# Patient Record
Sex: MALE | Race: WHITE | Employment: PART TIME | URBAN - METROPOLITAN AREA
[De-identification: names, ages, dates, MRNs, and addresses within clinical notes are randomized per-mention and may not be internally consistent; named-entity substitution may affect disease eponyms.]

---

## 2017-01-17 PROBLEM — L03.115 CELLULITIS OF RIGHT LOWER EXTREMITY: Status: ACTIVE | Noted: 2017-01-17

## 2017-01-18 ENCOUNTER — OUTSIDE SERVICES (OUTPATIENT)
Dept: PODIATRY | Age: 72
End: 2017-01-18

## 2017-06-19 ENCOUNTER — TELEPHONE (OUTPATIENT)
Dept: CASE MANAGEMENT | Age: 72
End: 2017-06-19

## 2021-07-22 ENCOUNTER — HOSPITAL ENCOUNTER (OUTPATIENT)
Dept: PULMONOLOGY | Age: 76
Discharge: HOME | End: 2021-07-22
Payer: COMMERCIAL

## 2021-07-22 VITALS — HEART RATE: 67 BPM | OXYGEN SATURATION: 99 % | RESPIRATION RATE: 16 BRPM

## 2021-07-22 DIAGNOSIS — R94.2 NONSPECIFIC ABNORMAL RESULTS OF PULMONARY SYSTEM FUNCTION STUDY: ICD-10-CM

## 2021-07-22 LAB
DLCO %PRED: 75 %
DLCO PRED: NORMAL
DLCO/VA %PRED: NORMAL
DLCO/VA PRED: NORMAL
DLCO/VA: NORMAL
DLCO: NORMAL
EXPIRATORY TIME: NORMAL
FEF 25-75% %PRED-PRE: NORMAL
FEF 25-75% PRED: NORMAL
FEF 25-75%-PRE: NORMAL
FEV1 %PRED-PRE: 43 %
FEV1 PRED: NORMAL
FEV1/FVC %PRED-PRE: NORMAL
FEV1/FVC PRED: NORMAL
FEV1/FVC: 96 %
FEV1: NORMAL
FVC %PRED-PRE: NORMAL
FVC PRED: NORMAL
FVC: NORMAL
GAW %PRED: NORMAL
GAW PRED: NORMAL
GAW: NORMAL
IC %PRED: NORMAL
IC PRED: NORMAL
IC: NORMAL
MVV %PRED-PRE: NORMAL
MVV PRED: NORMAL
MVV-PRE: NORMAL
PEF %PRED-PRE: NORMAL
PEF PRED: NORMAL
PEF-PRE: NORMAL
RAW %PRED: NORMAL
RAW PRED: NORMAL
RAW: NORMAL
RV %PRED: NORMAL
RV PRED: NORMAL
RV: NORMAL
SVC %PRED: NORMAL
SVC PRED: NORMAL
SVC: NORMAL
TLC %PRED: 78 %
TLC PRED: NORMAL
TLC: NORMAL
VA %PRED: NORMAL
VA PRED: NORMAL
VA: NORMAL
VTG %PRED: NORMAL
VTG PRED: NORMAL
VTG: NORMAL

## 2021-07-22 PROCEDURE — 94726 PLETHYSMOGRAPHY LUNG VOLUMES: CPT

## 2021-07-22 PROCEDURE — 94760 N-INVAS EAR/PLS OXIMETRY 1: CPT

## 2021-07-22 PROCEDURE — 94729 DIFFUSING CAPACITY: CPT

## 2021-07-22 PROCEDURE — 94200 LUNG FUNCTION TEST (MBC/MVV): CPT

## 2021-07-22 PROCEDURE — 94010 BREATHING CAPACITY TEST: CPT

## 2021-07-22 ASSESSMENT — PULMONARY FUNCTION TESTS
FEV1_PERCENT_PREDICTED_PRE: 43
FEV1/FVC: 96

## 2021-07-26 NOTE — PROCEDURES
Pulmonary Function Testing      Patient name:  Edson Oro     [facility] Unit #:   [id number]   Date of test: 7/22/2021  Date of interpretation:   7/26/2021    Mr. Edson Oro is a 67y.o. year-old current smoker. The spirometry data were acceptable and reproducible. Spirometry:  Flow volume loops were obstructed. The FEV-1/FVC ratio was decreased. The  Prebronchodilator FEV-1 was 1.35 liters (43% of predicted), which was severely decreased. The FVC was 1.95 liters (45% of predicted), which was decreased. Response to inhaled bronchodilators (albuterol) was not performed. Lung volumes:  Lung volumes were tested by plethysmography. The total lung capacity was 5.17 liters (78% of predicted), which was decreased. The residual volume was 2.49 liters (92% of predicted), which was normal. The ratio of residual volume to total lung capacity (RV/TLC) was 113, which was normal. Specific airway resistance was normal.    Diffusion capacity was found to be decreased. Interpretation:  Obstructive airway disease with reduced diffusion capacity.

## 2022-06-30 ENCOUNTER — HOSPITAL ENCOUNTER (OUTPATIENT)
Dept: PHYSICAL THERAPY | Age: 77
Setting detail: THERAPIES SERIES
Discharge: HOME | End: 2022-06-30
Payer: COMMERCIAL

## 2022-06-30 PROCEDURE — 97530 THERAPEUTIC ACTIVITIES: CPT

## 2022-06-30 PROCEDURE — 97161 PT EVAL LOW COMPLEX 20 MIN: CPT

## 2022-06-30 NOTE — PLAN OF CARE
[address] [address], [facility] [address]  Phone: [phone]   Fax: [phone]                                                       Physical Therapy Certification    Dear Deedee Green NP  ,    We had the pleasure of evaluating the following patient for physical therapy services at [address] [place] [address]. A summary of our findings can be found in the initial assessment below. This includes our plan of care. If you have any questions or concerns regarding these findings, please do not hesitate to contact me at the office phone number checked above. Thank you for the referral.       Physician Signature:_______________________________Date:__________________  By signing above (or electronic signature), therapists plan is approved by physician      Patient: Inge Echeverria   : 1945   MRN: [id number]  Referring Physician: Deedee Green NP        Evaluation Date: 2022      Medical Diagnosis Information:  Diagnosis: BILATERAL LOWER EXTREMITY LYMPHEDEMA     PT diagnosis:       B LE chronic swelling and wounds R > L and decreased B LE strength, decreased functional mobility                              Insurance information: PT Insurance Information: [address] [place] [address] - no auth/no copay/BMN     Preferred Language for Healthcare:   [x]English       []Other:    C-SSRS Triggered by Intake questionnaire (Past 2 wk assessment ):   [x] No, Questionnaire did not trigger screening.   [] Yes, Patient intake triggered C-SSRS Screening     [] Completed, no further action required.    [] Completed, PCP notified via Epic      Functional Scale:                                                                                                      Date assessed:  TO physical FS primary measure score = 39; risk adjusted = 47                   22    SUBJECTIVE: Pt leg swelling (I20)  []Atherosclerosis (I70)  []Pacemaker  []Hx of CABG/stent/  cardiac surgeries   Musculoskeletal conditions   []Disc pathology   []Congenital spine pathologies   []Osteoporosis (M81.8)  []Osteopenia (M85.8)  []Scoliosis       Endocrine conditions   []Hypothyroid (E03.9)  []Hyperthyroid Gastrointestinal conditions   []Constipation (Q76.92)   Metabolic conditions   []Morbid obesity (E66.01)  []Diabetes type 1(E10.65) or 2 (E11.65)   []Neuropathy (G60.9)     Cardio/Pulmonary conditions   []Asthma (J45)  []Coughing   []COPD (J44.9)  []CHF  []A-fib   Psychological Disorders  []Anxiety (F41.9)  []Depression (F32.9)   []Other:   Developmental Disorders  []Autism (F84.0)  []CP (G80)  []Down Syndrome (Q90.9)  []Developmental delay     Neurological conditions  []Prior Stroke (I69.30)  []Parkinson's (G20)  []Encephalopathy (G93.40)  []MS (G35)  []Post-polio (G14)  []SCI  []TBI  []ALS Other conditions  []Fibromyalgia (M79.7)  []Vertigo  []Syncope  []Kidney Failure  []Cancer      []currently undergoing                treatment  []Pregnancy  []Incontinence   Prior surgeries  []involved limb  []previous spinal surgery  [] section birth  []hysterectomy  []bowel / bladder surgery  []other relevant surgeries   [x]Other:   Smoker- 5 cigarettes per day            OBJECTIVE:   Posture: rounded shoulders    Functional Mobility/TransfersGAIT: pt enters PT clinic in w/c, able to self propel with LEs.  Pt able to transfer from w/c to bed with SBA, noted flex in BLE when standing, pt able to complete bed mobility independently, pt able to walk SBA with RW and w/c follow 121.3 ft      ROM Right Left Comments         LE    [facility] WFL Assist from UEs to ER hips               UE        Shoulder flex [facility] WFL    Shoulder AB [facility]    Shoulder ER      Shoulder IR                Strength / Myotomes Right Left Comments   LE      Hip Flexors (L1-2) 4- 4- Pain in BLE   Hip Internal Rotators      Hip External Rotators      Quads (L2-4) 3+ 4 Pain in BLE   Hamstrings  3+ 3+ Pain in BLE   Ankle Dorsiflexion (L4-5) 4- 4- Pain in BLE           Pitting   [] none [] slightly [x] moderate [] severe [] brawny (does not indent)   Color    [] dusky [] mottled [] red streaks [x] other: darkening L lateral shin   Skin Texture   [x] rough  [x] dry   [] moist  [] normal  [x] hyperkaratosis [x] hyperplasia  [x] hyperpigmentation [] Elephantiasis  [] papillomas  [x] Skin breakdown with lymphorrhea (weeping)  Skin Temperature   [] normal [] cool  [x] uneven - mildly inc on R [] warm [] hot  Edema Rebound   [] quick [] slow [x] fibrotic tissue - R>L  *pressure applied x10 seconds    Signs of Constriction:  Condition of Nailbeds:   [x] discolored [] red  [] white [] swollen     Skin Breakdown (indicate size, location and number) [x] Yes [] No  Comments: two wounds on R ant/lat shin, guaze is 2 by 2 inched on posterior wound, 1.5 by 2.5 inch on anterior wound  Fistulas (an abnormal passageway) [] Yes  [x] No  Comments:  Tinea (fungus) [x] Yes [] No  Comments: B toenails  Papilloma (benign tumor arising from an epithelial layer)  [] Yes [x] No  Comments:   Fibrotic areas [x] Yes [] No  Comments:  R>L LE below knee  Lymphorrhea (flow of lymph from a cut or ruptured lymph vessels): [x] Yes [] No  Comments: every time he gets a blister R LE  Warts (a local growth of the outer layer of skin) [] Yes [x] No  Comments:  Ulcers  [x] Yes [] No  Comments: R leg x2 see above    SCARS: from skin cancer surgery on R LE      STEMMER SIGN: [] positive [x] negative    Stage of Lymphedema   [] Latency stage/Lymphangiopathy (Stage 0 / Prestage / Subclinical stage):   · No swelling  · Reduced transport capacity (TC)  · \"Normal\" tissue consistency  [] Stage 1 (reversible stage):  · Edema is soft (pitting)  · No secondary tissue changes  · Elevation reduces swelling  [x] Stage 2 (spontaneously irreversible stage)  · Lyphostatic fibrosis  · Hardening of the tissue (no pitting)  · Stemmer sign positive   · Frequent infections   [] Stage 3 (lymphostatic elephantiasis)  · Extreme increase in volume and tissue texture with typical skin changes (papillomas, deep skinfolds, etc.)  · Stemmer sign positive    GIRTH MEASUREMENT  (Tape on skin along anterior tibialis)    Lower Extremity Right (cm) Left  (cm)   Date 6/30/22 6/30/22   Measurements taken as follows: 0 cm at inf aspect of lateral malleolus and marked on    [] Ant aspect of LE    [x] Lateral aspect of LE    [] sheet          cm  Widest at hip     cm at waist (at umbilicus), measured while reclined on hospital bed 132.0 cm         Metatarsal heads 25.7cm 25.7cm   0 Cm above inf aspect of  LATERAL MALLEOLUS 37.8 35.5 cm    10 Cm above inf aspect of  LATERAL MALLEOLUS    38.7 33.4cm   20 Cm above  inf aspec of LATERAL MALLEOLUS  45.3 38.5   30 Cm above  inf aspect of  LATERAL MALLEOLUS 46.4 41.4   Cm above  inf aspect of LATERAL MALLEOLUS      Cm above  inf aspect of LATERAL MALLEOLUS                    Total Girth 193.9 174.5       Classification for Lymphedema  [] Mild:  < 3 cm differential between affected limb and unaffected limb  [x] Moderate:  L LE :  3 - 5 cm differential between affected limb and unaffected limb   [x] Severe:   R LE:  5+ cm differential between affected limb and unaffected limb           Barriers to/and or personal factors that will affect rehab potential:              [x]Age  []Sex    [x]Smoker              []Motivation/Lack of Motivation                        []Co-Morbidities              []Cognitive Function, education/learning barriers              []Environmental, home barriers              []profession/work barriers  [x]past PT/medical experience  []other:    Falls Risk Assessment (30 days):   [x] Falls Risk assessed and no intervention required.   [] Falls Risk assessed and Patient requires intervention due to being higher risk   TUG score (>12s at risk):     [] Falls education provided, including         ASSESSMENT: Pt presents with B LE chronic swelling and wounds R > L and decreased B LE strength, decreased functional mobility. History of multiple wounds R  LE over the past 6 yrs. Pt impairments limit activities such as standing, walking, lifting, and squatting. Pt deficits limit participation in ADLs such as walking to the bathroom, household work, and participation in social activities. Pt will benefit from skilled PT to reduce swelling and increase strength for better functional mobility.      Functional Impairments:   [x] Noted increased girth of BLE  [x] Noted decreased health of skin at BLE and fibrotic tissue   [x]Decreased functional strength of BLE   []Reduced balance/proprioceptive control    []other:  reduced functional ROM of    [x]other:  reduce functional strength of standing and walking tolerance    []other: myofascial changes and pain at    [] Postural impairments:   []other:      Functional Activity Limitations (from functional questionnaire and intake)  [x]Reduced ability to use affected limb for ADLs/IADLs due to swelling causing symptoms of heaviness, skin tightness, pain  [x]Reduced ability to perform lifting, reaching, carrying tasks  [x]Reduced ability to wear his/her normal clothes/shoes due to swelling    [x]Reduced ability to tolerate prolonged functional positions  [x]Reduced ability or difficulty with changes of positions or transfers between positions  [x]Reduced ability to maintain good posture and demonstrate good body mechanics with sitting, bending, and lifting   []Reduced ability to sleep   [x] Reduced ability or tolerance with driving and/or computer work   [x]Reduced ability to squat   [x]Reduced ability to forward bend   [x]Reduced ability to ambulate prolonged functional periods/distances/surfaces   [x]Reduced ability to ascend/descend stairs    []Reduced ability to tolerate any impact through UE or spine   []other:        Participation Restrictions   [x]Reduced participation in self care activities   [x]Reduced participation in home management activities   []Reduced participation in work activities   [x]Reduced participation in social activities. [x]Reduced participation in sport/recreational activities. Prognosis/Rehab Potential:      []Excellent   [x]Good    []Fair   []Poor    Tolerance of evaluation/treatment:    []Excellent   [x]Good    []Fair   []Poor     Physical Therapy Evaluation Complexity Justification  [x] A history of present problem with:  [] no personal factors and/or comorbidities that impact the plan of care;  [x]1-2 personal factors and/or comorbidities that impact the plan of care  []3 personal factors and/or comorbidities that impact the plan of care  [x] An examination of body systems using standardized tests and measures addressing any of the following: body structures and functions (impairments), activity limitations, and/or participation restrictions;:  [x] a total of 1-2 or more elements   [] a total of 3 or more elements   [] a total of 4 or more elements   [x] A clinical presentation with:  [x] stable and/or uncomplicated characteristics   [] evolving clinical presentation with changing characteristics  [] unstable and unpredictable characteristics;   [x] Clinical decision making of [x] low, [] moderate, [] high complexity using standardized patient assessment instrument and/or measurable assessment of functional outcome.     [x] EVAL (LOW) 34398 (typically 15 minutes face-to-face)  [] EVAL (MOD) 10952 (typically 30 minutes face-to-face)  [] EVAL (HIGH) 59352 (typically 45 minutes face-to-face)  [] RE-EVAL     PLAN:  manual lymph drainage to BLEs; compression, HEP, education on lymphedema management, ROM/strength exercises to restore PLOF    Frequency/Duration:  2 days per week for 6 Weeks:  Interventions:  [x]  Compression to include multilayer compression bandaging and/or compression garments as appropriate  [x]  Manual therapy as indicated for BLE swelling to include: manual lymph drainage, STM, ROM as appropriate. [x]  Modalities as needed that may include: lymphedema pump, thermal agents, as indicated  [x]  Patient education on lymphedema management, compression, activity modification, progression of HEP. [x]  Therapeutic exercise including: strength/ROM/flexibility  [x]  NMR activation and proprioception  including postural re-education         AQUATIC EXERCISE      HEP instruction: Written HEP instructions provided and reviewed:    GOALS:  Patient stated goal: \"Pt wants to be able to stand with his walker and walk household distances\"  [] Progressing: [] Met: [] Not Met: [] Adjusted    Therapist goals for Patient:   Short Term Goals: To be achieved in: 2 weeks  1. Independent in HEP and progression per patient tolerance, in order to prevent return of swelling   [] Progressing: [] Met: [] Not Met: [] Adjusted  2. Patient will have a decrease in swelling/pain to facilitate improvement in movement, function, and ADLs as indicated by improvement with LLIS. [] Progressing: [] Met: [] Not Met: [] Adjusted    Long Term Goals: To be achieved in: 6 weeks  1. FOTO score of at least 46 to assist with reaching prior level of function. [] Progressing: [] Met: [] Not Met: [] Adjusted  2. Patient will demonstrate increased AROM/strength of BLE to 4/5 so that pt can resume walking and standing without increase in symptoms. [] Progressing: [] Met: [] Not Met: [] Adjusted  3. Decrease swelling of BLEs by 5cm so that pt can return to functional activities including standing, walking, squatting, and lifting without increased symptoms or restriction. [] Progressing: [] Met: [] Not Met: [] Adjusted  4. Patient will be able to stand for >/= 5 minutes in order to improve standing tolerance for performing ADLs. [] Progressing: [] Met: [] Not Met: [] Adjusted  5.  Patient will be able to ambulate short community distances (300-500ft) with the LRAD to improve functional mobility in his home and community. [] Progressing: [] Met: [] Not Met: [] Adjusted    Electronically signed by:  Aleida Francis PT DPT ELIESER Lee, SPT  Therapist was present, directed the patient's care, made skilled judgement, and was responsible for assessment and treatment of the patient.

## 2022-06-30 NOTE — FLOWSHEET NOTE
[address] [facility] Therapy  Phone: [phone]   Fax: [phone]    Physical Therapy Daily LYMPHEDEMA Treatment Note    Date:  2022    Patient Name:  Stephanie Bone    :  1945  MRN: [id number]  Restrictions/Precautions:    Medical/Treatment Diagnosis Information:  Diagnosis: BILATERAL LOWER EXTREMITY LYMPHEDEMA  · Treatment Diagnosis: B LE chronic swelling and wounds R > L and decreased B LE strength, decreased functional mobilityPT diagnosis:  Insurance/Certification information:  PT Insurance Information: [address] [place] [address] - no auth/no copay/BMN  Physician Information:  Brigette Bob NP    Plan of care signed (Y/N): []  Yes [x]  No     Date of Patient follow up with Physician:       Progress Report: []  Yes  [x]  No     Date Range for reporting period:  Beginnin22  Ending    Progress report due (10 Rx/or 30 days whichever is less): visit #10 or 3/81/15     Recertification due (POC duration/ or 90 days whichever is less): 22    Visit # Insurance Allowable Auth required?  Date Range    BMN []  Yes  [x]  No pcy        Latex Allergy:  [x]NO      []YES  Preferred Language for Healthcare:   [x]English       []other:      Functional Scale:                                                                                                      Date assessed:  TO physical FS primary measure score = 39; risk adjusted = 47                  22      Pain level:  2/10 BLEs    SUBJECTIVE:  See eval    OBJECTIVE: See eval      RESTRICTIONS/PRECAUTIONS:     Exercises/Interventions:     Therapeutic Exercises (23868) Resistance / level Sets/sec Reps Notes   Nu step       Pt educated on exercises to stimulate lymphatic flow   5'   ankle pumps  Toe DF/PF      CKC LE ex at // bars or counter                            Therapeutic Activities (50193)  (Dynamic activities such as compression, designed to improve functional performance) info for PT to send PT notes and insurance info to Ashley Quintero at [employer] re possibility of getting lymphedema pump for B LE    Pt education:   6/30   pt ed and provided with form on what compression wrappings to purchase. Ed on ankle pumps and toe flx/ext to stimulate lymphatic flow. Pt educated on pathology and anatomy of etiology of lymphedema, condition precautions, indications for long term prognosis. Pt was educated on diagnosis; prognosis; PT POC including MLD, compression (role of multilayer bandaging/ compression garments), lymphedema management/prevention of flare ups, role of exercise, HEP, lymphedema pump; expectations for rehab. All pt questions were answered. HEP instruction:  Pt instructed in lymphedema management/prevention concepts and swipe method of MLD, ankle pumps, toe DF/PF    Therapeutic Exercise and NMR EXR  [x] (98400) Provided verbal/tactile cueing for activities related to strengthening, flexibility, endurance, ROM for improvements in  [x] LE / Lumbar: LE, proximal hip, and core control with self care, mobility, lifting, ambulation. [] UE / Cervical: cervical, postural, scapular, scapulothoracic and UE control with self care, reaching, carrying, lifting, house/yardwork, driving, computer work.  [] (74838) Provided verbal/tactile cueing for activities related to improving balance, coordination, kinesthetic sense, posture, motor skill, proprioception to assist with   [] LE / lumbar: LE, proximal hip, and core control in self care, mobility, lifting, ambulation and eccentric single leg control.    [] UE / cervical: cervical, scapular, scapulothoracic and UE control with self care, reaching, carrying, lifting, house/yardwork, driving, computer work.   [] (39365) Therapist is in constant attendance of 2 or more patients providing skilled therapy interventions, but not providing any significant amount of measurable one-on-one time to either patient, for improvements in  [] LE / motor skill, proprioception of   [] LE: core, proximal hip and LE for self care, mobility, lifting, and ambulation/stair navigation    [] UE / Cervical: cervical, postural,  scapular, scapulothoracic and UE control with self care, reaching, carrying, lifting, house/yardwork, driving, computer work    Manual Treatments:  PROM / STM / Oscillations-Mobs:  G-I, II, III, IV (PA's, Inf., Post.)  [] (88874) Provided manual therapy to mobilize LE, proximal hip and/or LS spine soft tissue/joints for the purpose of modulating pain, promoting relaxation,  increasing ROM, reducing/eliminating soft tissue swelling/inflammation/restriction, improving soft tissue extensibility and allowing for proper ROM for normal function with   [] LE / lumbar: self care, mobility, lifting and ambulation. [] UE / Cervical: self care, reaching, carrying, lifting, house/yardwork, driving, computer work. Modalities:  [] (80334) Vasopneumatic compression: Utilized vasopneumatic compression to decrease edema / swelling for the purpose of improving mobility and quad tone / recruitment which will allow for increased overall function including but not limited to self-care, transfers, ambulation, and ascending / descending stairs. Charges:  Timed Code Treatment Minutes: 35   Total Treatment Minutes: 60     [x] EVAL - LOW (16780)   [] EVAL - MOD (39627)  [] EVAL - HIGH (97023)  [] RE-EVAL (66570)  [] GF(89629) x   1    [] Ionto  [] NMR (58806) x       [] Vaso  [] Manual (60792) x       [] Ultrasound  [x] TA x   2     [] Mech Traction (48248)  [] Aquatic Therapy x     [] ES (un) (24911):   [] Home Management Training x  [] ES(attended) (86018)   [] Dry Needling 1-2 muscles (40241):  [] Dry Needling 3+ muscles (827240  [] Group:      [] Other:     GOALS:  Patient stated goal: \"Pt wants to be able to stand with his walker and walk household distances\"  []? Progressing: []? Met: []? Not Met: []?  Adjusted     Therapist goals for Patient:   Short Term Goals: To be achieved in: 2 weeks  1. Independent in HEP and progression per patient tolerance, in order to prevent return of swelling   []? Progressing: []? Met: []? Not Met: []? Adjusted  2. Patient will have a decrease in swelling/pain to facilitate improvement in movement, function, and ADLs as indicated by improvement with LLIS. []? Progressing: []? Met: []? Not Met: []? Adjusted     Long Term Goals: To be achieved in: 6 weeks  1. FOTO score of at least 46 to assist with reaching prior level of function. []? Progressing: []? Met: []? Not Met: []? Adjusted  2. Patient will demonstrate increased AROM/strength of BLE to 4/5 so that pt can resume walking and standing without increase in symptoms. []? Progressing: []? Met: []? Not Met: []? Adjusted  3. Decrease swelling of BLEs by 5cm so that pt can return to functional activities including standing, walking, squatting, and lifting without increased symptoms or restriction. []? Progressing: []? Met: []? Not Met: []? Adjusted  4. Patient will be able to stand for >/= 5 minutes in order to improve standing tolerance for performing ADLs.  []? Progressing: []? Met: []? Not Met: []? Adjusted  5. Patient will be able to ambulate short community distances (300-500ft) with the LRAD to improve functional mobility in his home and community. []? Progressing: []? Met: []? Not Met: []? Adjusted    Overall Progression Towards Functional goals/ Treatment Progress Update:  [] Patient is progressing as expected towards functional goals listed. [] Progression is slowed due to complexities/Impairments listed. [] Progression has been slowed due to co-morbidities.   [x] Plan just implemented, too soon to assess goals progression <30days   [] Goals require adjustment due to lack of progress  [] Patient is not progressing as expected and requires additional follow up with physician  [] Other    Persisting Functional Limitations/Impairments:  []Sleeping []Sitting               [x]Standing [x]Transfers        [x]Walking [x]Kneeling               [x]Stairs [x]Squatting / bending   [x]ADLs []Reaching  [x]Lifting  [x]Housework  []Driving []Job related tasks  []Sports/Recreation []Other:        ASSESSMENT:  See eval    Treatment/Activity Tolerance:  [x] Patient able to complete tx [] Patient limited by fatigue  [] Patient limited by pain  [] Patient limited by other medical complications  [] Other:     Prognosis: [x] Good [] Fair  [] Poor    Patient Requires Follow-up: [x] Yes  [] No    Plan for next treatment session:   MLD, compression - bandage B LE below knee, HEP, pt education, lymph pump  B LE, lymph pump for home to help prevent chronic wounds R>L LE, exercise to stimulate lymphatic flow, LE ex, balance, gait, fxnl mobility    PLAN: See eval. PT 2x / week for 6 weeks. [] Continue per plan of care [] Alter current plan (see comments)  [x] Plan of care initiated [] Hold pending MD visit [] Discharge    Electronically signed by: Terrance Nieves, PT PT, DPT    Delta Se, SPT  Therapist was present, directed the patient's care, made skilled judgement, and was responsible for assessment and treatment of the patient. Note: If patient does not return for scheduled/ recommended follow up visits, this note will serve as a discharge from care along with most recent update on progress.

## 2022-07-12 ENCOUNTER — HOSPITAL ENCOUNTER (OUTPATIENT)
Dept: PHYSICAL THERAPY | Age: 77
Setting detail: THERAPIES SERIES
Discharge: HOME | End: 2022-07-12
Payer: COMMERCIAL

## 2022-07-12 PROCEDURE — 97530 THERAPEUTIC ACTIVITIES: CPT

## 2022-07-12 PROCEDURE — 97140 MANUAL THERAPY 1/> REGIONS: CPT

## 2022-07-12 NOTE — FLOWSHEET NOTE
[address] [facility] Physical Therapy  Phone: [phone]   Fax: [phone]    Physical Therapy Daily LYMPHEDEMA Treatment Note    Date:  2022    Patient Name:  Carlos Enrique Shaw    :  1945  MRN: [id number]  Restrictions/Precautions:    Medical/Treatment Diagnosis Information:  Diagnosis: BILATERAL LOWER EXTREMITY LYMPHEDEMA  · Treatment Diagnosis: B LE chronic swelling and wounds R > L and decreased B LE strength, decreased functional mobilityPT diagnosis:  Insurance/Certification information:  PT Insurance Information: [address] [place] [address] - no auth/no copay/BMN  Physician Information:  Mendel Clinton NP    Plan of care signed (Y/N): []  Yes [x]  No     Date of Patient follow up with Physician:       Progress Report: []  Yes  [x]  No     Date Range for reporting period:  Beginnin22  Ending    Progress report due (10 Rx/or 30 days whichever is less): visit #10 or      Recertification due (POC duration/ or 90 days whichever is less): 22    Visit # Insurance Allowable Auth required? Date Range    BMN []  Yes  [x]  No pcy        Latex Allergy:  [x]NO      []YES  Preferred Language for Healthcare:   [x]English       []other:      Functional Scale:                                                                                                      Date assessed:  TO physical FS primary measure score = 39; risk adjusted = 47                  22      Pain level:  0/10 BLEs    SUBJECTIVE:  Pt reports feeling good today. Reports swelling is same. Did not purchase any bandages due to cost. Has been wearing compression socks provided by nurses at nursing home.      OBJECTIVE: See eval      RESTRICTIONS/PRECAUTIONS:     Exercises/Interventions:     Therapeutic Exercises (58935) Resistance / level Sets/sec Reps Notes   Nu step       Pt educated on exercises to stimulate lymphatic flow         CKC LE ex at // bars or counter Therapeutic Activities (97399)  (Dynamic activities such as compression, designed to improve functional performance)       Compression: trial tensoshape size   applied to B LE - XL on R and large on L 8'      Multilayer compression bandaging to   Stockinette  Artiflex  Foam   cm low stretch compression bandage  cm low stretch compression bandage  cm low stretch compression bandage  cm low stretch compression bandage           Ambulation  100 ft w RW and SBA                                   Home Management  (providing pt education on safety procedures/instructions)       Pt educated on compression as follows:   how to appropriately apply and wear compression  how to maintain appropriate gradient compression  do not sleep with compression garment/tensoshape  signs and symptoms of constriction   when to remove compression       Pt educated on lymphedema prevention and management  5'   7/12 completed during MLD    Pt educated on MLD  5'                           Neuromuscular Re-ed (79794)       balance                     Manual Intervention ([id number])       See MLD flowchart below   X 30 min to B LEs                                         Manual Lymph Drainage (MLD):  MLD to B LE , clearing along alternate pathway of  Ipsilateral trunk  to  Ipsilateral axillary  lymph nodes    Clear Nodes 10x each   Neck x   [address]    Axilla x   Abdomen x   Groin x   Popliteal x2 x   Clear Alternate Pathway 10x each   Re-clear alternate pathway   x5 each position x   Location Ipsilateral trunk       Fluid Mobilization 10x each   Re-clear alternate pathway   x5 each position x   Shoulder bracing    Location B LE and ipsilateral trunk       Protein Resorption 10x each   Location LE below knee       Clear Foot/Ankle or Hand 10x each   Achilles x   Bilateral malleolus x   Fan the cards x   Clear dorsum x   Clear through web space x   Clear toes/fingers x   Fluid mobilization x   Re-clear all positions  X5 each x Modalities:     OTHER: 6/30 pt signed release of info for PT to send PT notes and insurance info to [facility] possibility of getting lymphedema pump for B LE    Pt education:   6/30   pt ed and provided with form on what compression wrappings to purchase. Ed on ankle pumps and toe flx/ext to stimulate lymphatic flow. Pt educated on pathology and anatomy of etiology of lymphedema, condition precautions, indications for long term prognosis. Pt was educated on diagnosis; prognosis; PT POC including MLD, compression (role of multilayer bandaging/ compression garments), lymphedema management/prevention of flare ups, role of exercise, HEP, lymphedema pump; expectations for rehab. All pt questions were answered. HEP instruction:  Pt instructed in lymphedema management/prevention concepts and swipe method of MLD, ankle pumps, toe DF/PF    Therapeutic Exercise and NMR EXR  [x] (95021) Provided verbal/tactile cueing for activities related to strengthening, flexibility, endurance, ROM for improvements in  [x] LE / Lumbar: LE, proximal hip, and core control with self care, mobility, lifting, ambulation. [] UE / Cervical: cervical, postural, scapular, scapulothoracic and UE control with self care, reaching, carrying, lifting, house/yardwork, driving, computer work.  [] (98906) Provided verbal/tactile cueing for activities related to improving balance, coordination, kinesthetic sense, posture, motor skill, proprioception to assist with   [] LE / lumbar: LE, proximal hip, and core control in self care, mobility, lifting, ambulation and eccentric single leg control.    [] UE / cervical: cervical, scapular, scapulothoracic and UE control with self care, reaching, carrying, lifting, house/yardwork, driving, computer work.   [] (37624) Therapist is in constant attendance of 2 or more patients providing skilled therapy interventions, but not providing any significant amount of measurable one-on-one time to either patient, for improvements in  [] LE / lumbar: LE, proximal hip, and core control in self care, mobility, lifting, ambulation and eccentric single leg control. [] UE / cervical: cervical, scapular, scapulothoracic and UE control with self care, reaching, carrying, lifting, house/yardwork, driving, computer work. NMR and Therapeutic Activities:    [x] (80991 or 33183) Provided verbal/tactile cueing for activities related to improving balance, coordination, kinesthetic sense, posture, motor skill, proprioception and motor activation to allow for proper function of   [x] LE: / Lumbar core, proximal hip and LE with self care and ADLs  [] UE / Cervical: cervical, postural, scapular, scapulothoracic and UE control with self care, carrying, lifting, driving, computer work.   [] (04318) Gait Re-education- Provided training and instruction to the patient for proper LE, core and proximal hip recruitment and positioning and eccentric body weight control with ambulation re-education including up and down stairs     Home Management Training / Self Care:  [] (89196) Provided self-care/home management training related to activities of daily living and compensatory training, and/or use of adaptive equipment for improvement with: ADLs and compensatory training, meal preparation, safety procedures and instruction in use of adaptive equipment, including bathing, grooming, dressing, personal hygiene, basic household cleaning and chores.      Home Exercise Program:    [] (30564) Reviewed/Progressed HEP activities related to strengthening, flexibility, endurance, ROM of   [] LE / Lumbar: core, proximal hip and LE for functional self-care, mobility, lifting and ambulation/stair navigation   [] UE / Cervical: cervical, postural, scapular, scapulothoracic and UE control with self care, reaching, carrying, lifting, house/yardwork, driving, computer work  [] (92308)Reviewed/Progressed HEP activities related to improving []? Adjusted     Therapist goals for Patient:   Short Term Goals: To be achieved in: 2 weeks  1. Independent in HEP and progression per patient tolerance, in order to prevent return of swelling   []? Progressing: []? Met: []? Not Met: []? Adjusted  2. Patient will have a decrease in swelling/pain to facilitate improvement in movement, function, and ADLs as indicated by improvement with LLIS. []? Progressing: []? Met: []? Not Met: []? Adjusted     Long Term Goals: To be achieved in: 6 weeks  1. FOTO score of at least 46 to assist with reaching prior level of function. []? Progressing: []? Met: []? Not Met: []? Adjusted  2. Patient will demonstrate increased AROM/strength of BLE to 4/5 so that pt can resume walking and standing without increase in symptoms. []? Progressing: []? Met: []? Not Met: []? Adjusted  3. Decrease swelling of BLEs by 5cm so that pt can return to functional activities including standing, walking, squatting, and lifting without increased symptoms or restriction. []? Progressing: []? Met: []? Not Met: []? Adjusted  4. Patient will be able to stand for >/= 5 minutes in order to improve standing tolerance for performing ADLs.  []? Progressing: []? Met: []? Not Met: []? Adjusted  5. Patient will be able to ambulate short community distances (300-500ft) with the LRAD to improve functional mobility in his home and community. []? Progressing: []? Met: []? Not Met: []? Adjusted    Overall Progression Towards Functional goals/ Treatment Progress Update:  [] Patient is progressing as expected towards functional goals listed. [] Progression is slowed due to complexities/Impairments listed. [] Progression has been slowed due to co-morbidities.   [x] Plan just implemented, too soon to assess goals progression <30days   [] Goals require adjustment due to lack of progress  [] Patient is not progressing as expected and requires additional follow up with physician  [] Other    Persisting Functional Limitations/Impairments:  []Sleeping []Sitting               [x]Standing [x]Transfers        [x]Walking [x]Kneeling               [x]Stairs [x]Squatting / bending   [x]ADLs []Reaching  [x]Lifting  [x]Housework  []Driving []Job related tasks  []Sports/Recreation []Other:        ASSESSMENT:  Deferred pump this date due to three wounds on the R LE. Performed MLD to B LEs and educated pt on the importance and reasoning for MLD. Provided pt with tensoshape for B LEs and educated on graduated compression of tensoshape being more effective for decreasing swelling compared to compression socks. Ended the session with a 100 ft walk around the clinic with use of RW. Cont skilled PT to improve functional mobility and decrease edema. Updated visits as able to 90 min to allow for lymphedema management and functional mobility. Treatment/Activity Tolerance:  [x] Patient able to complete tx [] Patient limited by fatigue  [] Patient limited by pain  [] Patient limited by other medical complications  [] Other:     Prognosis: [x] Good [] Fair  [] Poor    Patient Requires Follow-up: [x] Yes  [] No    Plan for next treatment session:   MLD, compression - bandage B LE below knee, HEP, pt education, lymph pump  B LE, lymph pump for home to help prevent chronic wounds R>L LE, exercise to stimulate lymphatic flow, LE ex, balance, gait, fxnl mobility    PLAN: See eval. PT 2x / week for 6 weeks. [] Continue per plan of care [] Alter current plan (see comments)  [x] Plan of care initiated [] Hold pending MD visit [] Discharge    Electronically signed by: Brea Krueger, PT PT, DPT    Monica Eldridge, SPT  Therapist was present, directed the patient's care, made skilled judgement, and was responsible for assessment and treatment of the patient. Note: If patient does not return for scheduled/ recommended follow up visits, this note will serve as a discharge from care along with most recent update on progress.

## 2022-07-15 ENCOUNTER — HOSPITAL ENCOUNTER (OUTPATIENT)
Dept: PHYSICAL THERAPY | Age: 77
Setting detail: THERAPIES SERIES
Discharge: HOME | End: 2022-07-15
Payer: COMMERCIAL

## 2022-07-15 PROCEDURE — 97140 MANUAL THERAPY 1/> REGIONS: CPT

## 2022-07-15 PROCEDURE — 97530 THERAPEUTIC ACTIVITIES: CPT
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
## 2022-07-19 ENCOUNTER — HOSPITAL ENCOUNTER (OUTPATIENT)
Dept: PHYSICAL THERAPY | Age: 77
Setting detail: THERAPIES SERIES
Discharge: HOME | End: 2022-07-19
Payer: COMMERCIAL

## 2022-07-19 NOTE — FLOWSHEET NOTE
Physical Therapy  Cancellation/No-show Note  Patient Name:  Itz Serrano  :  1945   Date:  2022  Cancelled visits to date: 1  No-shows to date: 0    Patient status for today's appointment patient:  [x]  Cancelled 22   []  Rescheduled appointment  []  No-show     Reason given by patient:  [x]  Patient ill   []  Conflicting appointment  []  No transportation    []  Conflict with work  []  No reason given  []  Other:     Comments:      Phone call information:   []  Phone call made today to patient at _ time at number provided:      []  Patient answered, conversation as follows:    []  Patient did not answer, message left as follows:  []  Phone call not made today  [x]  Phone call not needed - pt contacted us to cancel and provided reason for cancellation.      Electronically signed by:  Ezra Stubbs PT

## 2022-07-21 ENCOUNTER — HOSPITAL ENCOUNTER (OUTPATIENT)
Dept: PHYSICAL THERAPY | Age: 77
Setting detail: THERAPIES SERIES
Discharge: HOME | End: 2022-07-21
Payer: COMMERCIAL

## 2022-07-21 PROCEDURE — 97116 GAIT TRAINING THERAPY: CPT

## 2022-07-21 PROCEDURE — 97530 THERAPEUTIC ACTIVITIES: CPT
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
## 2022-07-26 ENCOUNTER — HOSPITAL ENCOUNTER (OUTPATIENT)
Dept: PHYSICAL THERAPY | Age: 77
Setting detail: THERAPIES SERIES
Discharge: HOME | End: 2022-07-26
Payer: COMMERCIAL

## 2022-07-26 PROCEDURE — 97140 MANUAL THERAPY 1/> REGIONS: CPT

## 2022-07-26 PROCEDURE — 97530 THERAPEUTIC ACTIVITIES: CPT

## 2022-07-26 NOTE — FLOWSHEET NOTE
[address] [facility]  Phone: [phone]   Fax: [phone]    Physical Therapy Daily LYMPHEDEMA Treatment Note    Date:  2022    Patient Name:  Karyna Callejas    :  1945  MRN: [id number]  Restrictions/Precautions:    Medical/Treatment Diagnosis Information:  Diagnosis: BILATERAL LOWER EXTREMITY LYMPHEDEMA  Treatment Diagnosis: B LE chronic swelling and wounds R > L and decreased B LE strength, decreased functional mobilityPT diagnosis:  Insurance/Certification information:  PT Insurance Information: [address] [place] [address] - no auth/no copay/BMN  Physician Information:  Wan Fairbanks NP    Plan of care signed (Y/N): []  Yes [x]  No     Date of Patient follow up with Physician:       Progress Report: []  Yes  [x]  No     Date Range for reporting period:  Beginnin22  PT POC 22 - faxed for signature  Ending    Progress report due (10 Rx/or 30 days whichever is less): visit #10 or 3/59/41     Recertification due (POC duration/ or 90 days whichever is less): 22    Visit # Insurance Allowable Auth required? Date Range    BMN []  Yes  [x]  No pcy        Latex Allergy:  [x]NO      []YES  Preferred Language for Healthcare:   [x]English       []other:      Functional Scale:                                                                                                      Date assessed:  TO physical FS primary measure score = 39; risk adjusted = 47                  22      Pain level:  0/10 BLEs    SUBJECTIVE:  Arelis, wound care nurse, from [place] [address] point present at session this date. Brought compression bandages - 12 cm on backorder. Reports he has been on Keflex 2x daily since Thursday and has not been wearing compression since. OBJECTIVE:    Pt to clinic wearing 2 tensoshape each LE from mid foot to below knee. Signs of constriction present B LE ant ankle and R LE below knee - inappropriate compression gradient. Dorsal foot swelling increased B - due to constriction at ankles from rolled and doubled tensoshapes  causing constriction  multiple new wounds present B LEs:  L medial calf: 1.5 x 0.8cm, red area L ant ankle: 2.7cnx2.0 cm; R ant shin: 1.7cmx1.7cm, R ant ankle: not yet open but very red: 3.5cmx2.5 with black area in center of red area: 1.0cm x 0.9cm  Redness present B dorsal feet and R LE below knee. Increased warmth R LE compared to L. Concern for possible cellulitis. RESTRICTIONS/PRECAUTIONS:     Exercises/Interventions:     Therapeutic Exercises (11427) Resistance / level Sets/sec Reps Notes   Nu step       Pt educated on exercises to stimulate lymphatic flow        CKC LE ex at // bars or counter                            Therapeutic Activities (82608)  (Dynamic activities such as compression, designed to improve functional performance)       Transfers:  sit to/from stand from  and hospital bed      Compression: trial tensoshape size   applied to B LE - XL   7/15 Educated to follow up with wound care nurse when he returns home due to weeping and need for dressing change.  Pt states he is on oral antibiotic      Multilayer compression bandaging to BLE  Stock[place]ette     - held this date due to wounds    8 cm low stretch compression bandage  12 cm low stretch compression bandage       X 20 min    7/26 educated pt and Arelis (wound care nurse) on bandaging    Ambulation      7/21 pt SOB after   HEP   - LLD knee ext stretch   - quad set    X 5 min   3 sec x 10       Transfer w/c <> bed - SBA  X 1  VC for knee ext                  Home Management  (providing pt education on safety procedures/instructions)       Pt educated on compression as follows:   how to appropriately apply and wear compression  how to maintain appropriate gradient compression  do not sleep with compression garment/tensoshape  signs and symptoms of constriction   when to remove compression       Pt educated on lymphedema prevention and management    7/12 completed during MLD    Pt educated on MLD                            Neuromuscular Re-ed (95051)       balance                     Manual Intervention (90422)       See MLD flowchart below   X 45 min  7/21 deferred due to concern re possible cellulitis                                        Manual Lymph Drainage (MLD):  MLD to B LE , clearing along alternate pathway of  Ipsilateral trunk  to  Ipsilateral axillary  lymph nodes    Clear Nodes 10x each   Neck x   [address]    Axilla x   Abdomen x   Groin x   Popliteal x2 x   Clear Alternate Pathway 10x each   Re-clear alternate pathway   x5 each position x   Location Ipsilateral trunk       Fluid Mobilization 10x each   Re-clear alternate pathway   x5 each position x   Shoulder bracing    Location B LE and ipsilateral trunk       Protein Resorption 10x each   Location LE below knee       Clear Foot/Ankle or Hand 10x each   Achilles x   Bilateral malleolus x   Fan the cards x   Clear dorsum x   Clear through web space x   Clear toes/fingers x   Fluid mobilization x   Re-clear all positions  X5 each x       Modalities:     OTHER:   7/21 pt signed release of info form for PT to communicate with medical staff at [facility] (the [place] where he lives)     7/21 PT called Gerry Basurto NP  re pt- discussed concern re possible new onset cellulitis R and or L LE, new onset wounds, inappropriate compression gradient with 2 tensoshapes that rolled. Nilda Gan  states she will call in antibiotic. At pt request, PT called SHANIA Wilcox [phone]; unable to LM due to VM full. Called Lisa director of nursing next at [phone] - no answer, no VM. PT wrote note to RNs a pt's independent living facility re need for approp compression gradient and pt will order lymphedema bandages - low stretch 2x8cm, 4x12 cm, 2x15 cm artiflex.       6/30 pt signed release of info for PT to send PT notes and insurance info to [place] at [facility] re possibility of getting lymphedema pump for B LE    Pt education:   7/21 extensive pt ed re appropriate compression and compression gradient, need to avoid constriction    6/30   pt ed and provided with form on what compression wrappings to purchase. Ed on ankle pumps and toe flx/ext to stimulate lymphatic flow. Pt educated on pathology and anatomy of etiology of lymphedema, condition precautions, indications for long term prognosis. Pt was educated on diagnosis; prognosis; PT POC including MLD, compression (role of multilayer bandaging/ compression garments), lymphedema management/prevention of flare ups, role of exercise, HEP, lymphedema pump; expectations for rehab. All pt questions were answered. HEP instruction:  7/26   Access Code: [email or web address]  URL: [email or web address]  Date: 07/26/2022  Prepared by: Fabiola Mccarthy    Exercises  Long Sitting Quad Set - 3 x daily - 7 x weekly - 1 sets - 10 reps - 5 sec hold  Supine Knee Extension Stretch on Towel Roll - 3 x daily - 7 x weekly - 5-6 min hold    7/21 pt to order low stretch compression bandages: 2x8cm, 4x12 cm and artiflex 2x15 cm and bring to next appt. Encouraged pt to try to plan for a staff member to come to appt to be instructed in lymphedema wrapping and approp comrpession gradient    Eval: Pt instructed in lymphedema management/prevention concepts and swipe method of MLD, ankle pumps, toe DF/PF    Therapeutic Exercise and NMR EXR  [x] (41314) Provided verbal/tactile cueing for activities related to strengthening, flexibility, endurance, ROM for improvements in  [x] LE / Lumbar: LE, proximal hip, and core control with self care, mobility, lifting, ambulation.   [] UE / Cervical: cervical, postural, scapular, scapulothoracic and UE control with self care, reaching, carrying, lifting, house/yardwork, driving, computer work.  [] (27370) Provided verbal/tactile cueing for activities related to improving balance, coordination, kinesthetic sense, posture, motor skill, proprioception to assist with   [] LE / lumbar: LE, proximal hip, and core control in self care, mobility, lifting, ambulation and eccentric single leg control. [] UE / cervical: cervical, scapular, scapulothoracic and UE control with self care, reaching, carrying, lifting, house/yardwork, driving, computer work.   [] (02273) Therapist is in constant attendance of 2 or more patients providing skilled therapy interventions, but not providing any significant amount of measurable one-on-one time to either patient, for improvements in  [] LE / lumbar: LE, proximal hip, and core control in self care, mobility, lifting, ambulation and eccentric single leg control. [] UE / cervical: cervical, scapular, scapulothoracic and UE control with self care, reaching, carrying, lifting, house/yardwork, driving, computer work.      NMR and Therapeutic Activities:    [x] (77778 or 33040) Provided verbal/tactile cueing for activities related to improving balance, coordination, kinesthetic sense, posture, motor skill, proprioception and motor activation to allow for proper function of   [x] LE: / Lumbar core, proximal hip and LE with self care and ADLs  [] UE / Cervical: cervical, postural, scapular, scapulothoracic and UE control with self care, carrying, lifting, driving, computer work.   [] (12874) Gait Re-education- Provided training and instruction to the patient for proper LE, core and proximal hip recruitment and positioning and eccentric body weight control with ambulation re-education including up and down stairs     Home Management Training / Self Care:  [] (94774) Provided self-care/home management training related to activities of daily living and compensatory training, and/or use of adaptive equipment for improvement with: ADLs and compensatory training, meal preparation, safety procedures and instruction in use of adaptive equipment, including bathing, grooming, dressing, personal hygiene, basic household cleaning and chores. Home Exercise Program:    [] (13078) Reviewed/Progressed HEP activities related to strengthening, flexibility, endurance, ROM of   [] LE / Lumbar: core, proximal hip and LE for functional self-care, mobility, lifting and ambulation/stair navigation   [] UE / Cervical: cervical, postural, scapular, scapulothoracic and UE control with self care, reaching, carrying, lifting, house/yardwork, driving, computer work  [] (52499)Reviewed/Progressed HEP activities related to improving balance, coordination, kinesthetic sense, posture, motor skill, proprioception of   [] LE: core, proximal hip and LE for self care, mobility, lifting, and ambulation/stair navigation    [] UE / Cervical: cervical, postural,  scapular, scapulothoracic and UE control with self care, reaching, carrying, lifting, house/yardwork, driving, computer work    Manual Treatments:  PROM / STM / Oscillations-Mobs:  G-I, II, III, IV (PA's, Inf., Post.)  [x] (47153) Provided manual therapy to mobilize LE, proximal hip and/or LS spine soft tissue/joints for the purpose of modulating pain, promoting relaxation,  increasing ROM, reducing/eliminating soft tissue swelling/inflammation/restriction, improving soft tissue extensibility and allowing for proper ROM for normal function with   [] LE / lumbar: self care, mobility, lifting and ambulation. [] UE / Cervical: self care, reaching, carrying, lifting, house/yardwork, driving, computer work. Modalities:  [] (51261) Vasopneumatic compression: Utilized vasopneumatic compression to decrease edema / swelling for the purpose of improving mobility and quad tone / recruitment which will allow for increased overall function including but not limited to self-care, transfers, ambulation, and ascending / descending stairs.        Charges:  Timed Code Treatment Minutes: 75   Total Treatment Minutes: 90     [] EVAL - LOW (26156)   [] EVAL - MOD (59863)  [] EVAL - HIGH (88209)  [] RE-EVAL (49499)  [] UZ(08560) x   1    [] Ionto  [] NMR (38057) x       [] Vaso  [x] Manual (56807) x   3    [] Ultrasound  [x] TA x   2     [] Mech Traction (61272)  [] Aquatic Therapy x     [] ES (un) (07341):   [] Home Management Training x  [] ES(attended) (48810)   [] Dry Needling 1-2 muscles (18308):  [] Dry Needling 3+ muscles (409762  [] Group:      [] Other: gaitx1    GOALS:  Patient stated goal: \"Pt wants to be able to stand with his walker and walk household distances\"  [] Progressing: [] Met: [] Not Met: [] Adjusted     Therapist goals for Patient:   Short Term Goals: To be achieved in: 2 weeks  1. Independent in HEP and progression per patient tolerance, in order to prevent return of swelling   [] Progressing: [] Met: [] Not Met: [] Adjusted  2. Patient will have a decrease in swelling/pain to facilitate improvement in movement, function, and ADLs as indicated by improvement with LLIS. [] Progressing: [] Met: [] Not Met: [] Adjusted     Long Term Goals: To be achieved in: 6 weeks  1. FOTO score of at least 46 to assist with reaching prior level of function. [] Progressing: [] Met: [] Not Met: [] Adjusted  2. Patient will demonstrate increased AROM/strength of BLE to 4/5 so that pt can resume walking and standing without increase in symptoms. [] Progressing: [] Met: [] Not Met: [] Adjusted  3. Decrease swelling of BLEs by 5cm so that pt can return to functional activities including standing, walking, squatting, and lifting without increased symptoms or restriction. [] Progressing: [] Met: [] Not Met: [] Adjusted  4. Patient will be able to stand for >/= 5 minutes in order to improve standing tolerance for performing ADLs. [] Progressing: [] Met: [] Not Met: [] Adjusted  5. Patient will be able to ambulate short community distances (300-500ft) with the LRAD to improve functional mobility in his home and community.    [] Progressing: [] Met: [] Not Met: [] Adjusted    Overall Progression Towards Functional goals/ Treatment Progress Update:  [] Patient is progressing as expected towards functional goals listed. [] Progression is slowed due to complexities/Impairments listed. [] Progression has been slowed due to co-morbidities. [x] Plan just implemented, too soon to assess goals progression <30days   [] Goals require adjustment due to lack of progress  [] Patient is not progressing as expected and requires additional follow up with physician  [] Other    Persisting Functional Limitations/Impairments:  []Sleeping []Sitting               [x]Standing [x]Transfers        [x]Walking [x]Kneeling               [x]Stairs [x]Squatting / bending   [x]ADLs []Reaching  [x]Lifting  [x]Housework  []Driving []Job related tasks  []Sports/Recreation []Other:        ASSESSMENT:    Pt's wound care nurse present this date. Changed dressings at start of session. Pt has been on antibiotics since Thursday and presents with no increased warmth of RLE. Has not been wearing tensoshape since LV and presents with decreased edema in B feet. Pt brought compression bandages this date. Educated pt and his wound care nurse on bandaging with gradient compression. Pt lacking full knee ext in RLE. Provided with HEP for stretching and quad strength this date. Pt will benefit from compression, MLD, and home program to further decrease edema and improve functional mobility. Treatment/Activity Tolerance:  [x] Patient able to complete tx [] Patient limited by fatigue  [] Patient limited by pain  [] Patient limited by other medical complications  [] Other:     Prognosis: [x] Good [] Fair  [] Poor    Patient Requires Follow-up: [x] Yes  [] No    Plan for next treatment session:   MLD, compression - bandage B LE below knee , HEP, pt education, lymph pump  B LE, lymph pump for home to help prevent chronic wounds R>L LE, exercise to stimulate lymphatic flow, LE ex, balance, gait, fxnl mobility    PLAN: See eval. PT 2x / week for 6 weeks.    [x] Continue per plan of care [] Alter current plan (see comments)  [] Plan of care initiated [] Hold pending MD visit [] Discharge    Electronically signed by: Kaley Villatoro, PT, DPT      Note: If patient does not return for scheduled/ recommended follow up visits, this note will serve as a discharge from care along with most recent update on progress.

## 2022-07-29 ENCOUNTER — HOSPITAL ENCOUNTER (OUTPATIENT)
Dept: PHYSICAL THERAPY | Age: 77
Setting detail: THERAPIES SERIES
Discharge: HOME | End: 2022-07-29
Payer: COMMERCIAL

## 2022-07-29 PROCEDURE — 97530 THERAPEUTIC ACTIVITIES: CPT

## 2022-08-02 ENCOUNTER — HOSPITAL ENCOUNTER (OUTPATIENT)
Dept: PHYSICAL THERAPY | Age: 77
Setting detail: THERAPIES SERIES
Discharge: HOME | End: 2022-08-02
Payer: COMMERCIAL

## 2022-08-02 PROCEDURE — 97530 THERAPEUTIC ACTIVITIES: CPT

## 2022-08-02 PROCEDURE — 97140 MANUAL THERAPY 1/> REGIONS: CPT

## 2022-08-02 NOTE — FLOWSHEET NOTE
[address] [facility]  Phone: [phone]   Fax: [phone]    Physical Therapy Daily LYMPHEDEMA Treatment Note    Date:  2022    Patient Name:  Rudy Garcia    :  1945  MRN: [id number]  Restrictions/Precautions:    Medical/Treatment Diagnosis Information:  Diagnosis: BILATERAL LOWER EXTREMITY LYMPHEDEMA  Treatment Diagnosis: B LE chronic swelling and wounds R > L and decreased B LE strength, decreased functional mobilityPT diagnosis:  Insurance/Certification information:  PT Insurance Information: [address] [place] [address] - no auth/no copay/BMN  Physician Information:  Chelle Kruger NP    Plan of care signed (Y/N): []  Yes [x]  No     Date of Patient follow up with Physician:       Progress Report: []  Yes  [x]  No     Date Range for reporting period:  Beginnin22  PT POC 22 - faxed for signature  Ending    Progress report due (10 Rx/or 30 days whichever is less): visit #10 or      Recertification due (POC duration/ or 90 days whichever is less): 22    Visit # Insurance Allowable Auth required? Date Range    +  BMN []  Yes  [x]  No pcy        Latex Allergy:  [x]NO      []YES  Preferred Language for Healthcare:   [x]English       []other:      Functional Scale:                                                                                                      Date assessed:  FOTO physical FS primary measure score = 39; risk adjusted = 47                  22  FOTO physical FS primary measure score = 46                 22    Pain level:  0/10 BLEs    SUBJECTIVE:  Pt reports he is doing well today. Wearing bandages. States his wounds are healing.      OBJECTIVE:     GIRTH MEASUREMENT  (Tape on skin along anterior tibialis)     Lower Extremity Right (cm) Left  (cm) Right (cm) Left (cm)   Date 22 8/2   Measurements taken as follows: 0 cm at inf aspect of lateral malleolus and marked on [] Ant aspect of LE    [x] Lateral aspect of LE    [] sheet                   cm  Widest at hip         cm at waist (at umbilicus), measured while reclined on hospital bed 132.0 cm                 Metatarsal heads 25.7cm 25.7cm 26.1 25.5   0 Cm above inf aspect of  LATERAL MALLEOLUS 37.8 35.5 cm 34.9 32.5    10 Cm above inf aspect of  LATERAL MALLEOLUS    38.7 33.4cm 39.1 30.6   20 Cm above  inf aspec of LATERAL MALLEOLUS 45.3 38.5 44.4 39.8   30 Cm above  inf aspect of  LATERAL MALLEOLUS 46.4 41.4 45.5 46.8             Total Girth 193.9 174.5 190.0 175.2     RLE decreased total girth by 3.9 cm   LLE increased total girth by 0.7 cm     7/21   Pt to clinic wearing 2 tensoshape each LE from mid foot to below knee. Signs of constriction present B LE ant ankle and R LE below knee - inappropriate compression gradient. Dorsal foot swelling increased B - due to constriction at ankles from rolled and doubled tensoshapes  causing constriction  multiple new wounds present B LEs:  L medial calf: 1.5 x 0.8cm, red area L ant ankle: 2.7cnx2.0 cm; R ant shin: 1.7cmx1.7cm, R ant ankle: not yet open but very red: 3.5cmx2.5 with black area in center of red area: 1.0cm x 0.9cm  Redness present B dorsal feet and R LE below knee. Increased warmth R LE compared to L. Concern for possible cellulitis.      RESTRICTIONS/PRECAUTIONS:     Exercises/Interventions:     Therapeutic Exercises (80658) Resistance / level Sets/sec Reps Notes   Nu step       Pt educated on exercises to stimulate lymphatic flow        CKC LE ex at // bars or counter                            Therapeutic Activities (62994)  (Dynamic activities such as compression, designed to improve functional performance)       Transfers:  sit to/from stand from  and hospital bed   VC for knee, hip, and trunk ext    Compression: trial tensoshape size   applied to B LE - XL   7/15 Educated to follow up with wound care nurse when he returns home due to weeping and need for dressing change. Pt states he is on oral antibiotic      Multilayer compression bandaging to BLE  Stockinette     - held this date due to wounds    8 cm low stretch compression bandage  12 cm low stretch compression bandage    7/29 unwrapped lower extremities. Discussed increased compression at foot and wrapping distal to proximal. Wrote notes for pt's nurse. 8/2 unwrapped. Educated to increase compression proximally     PN completed 8/2 - objective measures taken. Discussed progress made with therapy.         X 45 min total   7/26 educated pt and Arelis (wound care nurse) on bandaging    Ambulation    7/21 pt SOB after   HEP   - LLD knee ext stretch   - quad set   - ankle pump  - LAQ      Transfer w/c <> bed - SBA  VC for knee ext                  Home Management  (providing pt education on safety procedures/instructions)       Pt educated on compression as follows:   how to appropriately apply and wear compression  how to maintain appropriate gradient compression  do not sleep with compression garment/tensoshape  signs and symptoms of constriction   when to remove compression       Pt educated on lymphedema prevention and management    7/12 completed during MLD    Pt educated on MLD                            Neuromuscular Re-ed (89906)       balance                     Manual Intervention (45484)      See MLD flowchart below   X 40 min  7/21 deferred due to concern re possible cellulitis                                        Manual Lymph Drainage (MLD):  MLD to B LE , clearing along alternate pathway of  Ipsilateral trunk  to  Ipsilateral axillary  lymph nodes    Clear Nodes 10x each   Neck x   [address]    Axilla x   Abdomen x   Groin x   Popliteal x2 x   Clear Alternate Pathway 10x each   Re-clear alternate pathway   x5 each position x   Location Ipsilateral trunk       Fluid Mobilization 10x each   Re-clear alternate pathway   x5 each position x   Shoulder bracing    Location B LE and ipsilateral trunk Protein Resorption 10x each   Location LE below knee       Clear Foot/Ankle or Hand 10x each   Achilles x   Bilateral malleolus x   Fan the cards x   Clear dorsum x   Clear through web space x   Clear toes/fingers x   Fluid mobilization x   Re-clear all positions  X5 each x       Modalities:     OTHER:   7/21 pt signed release of info form for PT to communicate with medical staff at [facility] (the [facility] where he lives)     7/21 PT called Naveed Bauman NP  re pt- discussed concern re possible new onset cellulitis R and or L LE, new onset wounds, inappropriate compression gradient with 2 tensoshapes that rolled. Lyle Espinosa  states she will call in antibiotic. At pt request, PT called RN Richard Huston [phone]; unable to LM due to VM full. Called [place] director of nursing next at [phone] - no answer, no VM. PT wrote note to RNs a pt's independent living facility re need for approp compression gradient and pt will order lymphedema bandages - low stretch 2x8cm, 4x12 cm, 2x15 cm artiflex. 6/30 pt signed release of info for PT to send PT notes and insurance info to [facility] re possibility of getting lymphedema pump for B LE    Pt education:   7/21 extensive pt ed re appropriate compression and compression gradient, need to avoid constriction    6/30   pt ed and provided with form on what compression wrappings to purchase. Ed on ankle pumps and toe flx/ext to stimulate lymphatic flow. Pt educated on pathology and anatomy of etiology of lymphedema, condition precautions, indications for long term prognosis. Pt was educated on diagnosis; prognosis; PT POC including MLD, compression (role of multilayer bandaging/ compression garments), lymphedema management/prevention of flare ups, role of exercise, HEP, lymphedema pump; expectations for rehab. All pt questions were answered. HEP instruction:  7/26   Access Code: [email or web address]  URL: [email or web address]  Date: 07/26/2022  Prepared by: Hansel Gilmore    Exercises  Long Sitting Quad Set - 3 x daily - 7 x weekly - 1 sets - 10 reps - 5 sec hold  Supine Knee Extension Stretch on Towel Roll - 3 x daily - 7 x weekly - 5-6 min hold    7/21 pt to order low stretch compression bandages: 2x8cm, 4x12 cm and artiflex 2x15 cm and bring to next appt. Encouraged pt to try to plan for a staff member to come to appt to be instructed in lymphedema wrapping and approp comrpession gradient    Eval: Pt instructed in lymphedema management/prevention concepts and swipe method of MLD, ankle pumps, toe DF/PF    Therapeutic Exercise and NMR EXR  [] (95072) Provided verbal/tactile cueing for activities related to strengthening, flexibility, endurance, ROM for improvements in  [] LE / Lumbar: LE, proximal hip, and core control with self care, mobility, lifting, ambulation. [] UE / Cervical: cervical, postural, scapular, scapulothoracic and UE control with self care, reaching, carrying, lifting, house/yardwork, driving, computer work.  [] (85150) Provided verbal/tactile cueing for activities related to improving balance, coordination, kinesthetic sense, posture, motor skill, proprioception to assist with   [] LE / lumbar: LE, proximal hip, and core control in self care, mobility, lifting, ambulation and eccentric single leg control. [] UE / cervical: cervical, scapular, scapulothoracic and UE control with self care, reaching, carrying, lifting, house/yardwork, driving, computer work.   [] (84660) Therapist is in constant attendance of 2 or more patients providing skilled therapy interventions, but not providing any significant amount of measurable one-on-one time to either patient, for improvements in  [] LE / lumbar: LE, proximal hip, and core control in self care, mobility, lifting, ambulation and eccentric single leg control.    [] UE / cervical: cervical, scapular, scapulothoracic and UE control with self care, reaching, carrying, lifting, carrying, lifting, house/yardwork, driving, computer work    Manual Treatments:  PROM / STM / Oscillations-Mobs:  G-I, II, III, IV (PA's, Inf., Post.)  [x] (70291) Provided manual therapy to mobilize LE, proximal hip and/or LS spine soft tissue/joints for the purpose of modulating pain, promoting relaxation,  increasing ROM, reducing/eliminating soft tissue swelling/inflammation/restriction, improving soft tissue extensibility and allowing for proper ROM for normal function with   [x] LE / lumbar: self care, mobility, lifting and ambulation. [] UE / Cervical: self care, reaching, carrying, lifting, house/yardwork, driving, computer work. Modalities:  [] (63899) Vasopneumatic compression: Utilized vasopneumatic compression to decrease edema / swelling for the purpose of improving mobility and quad tone / recruitment which will allow for increased overall function including but not limited to self-care, transfers, ambulation, and ascending / descending stairs. Charges:  Timed Code Treatment Minutes: 85   Total Treatment Minutes: 85     [] EVAL - LOW (98097)   [] EVAL - MOD (10028)  [] EVAL - HIGH (14514)  [] RE-EVAL (79038)  [] VY(93866) x   1    [] Ionto  [] NMR (03655) x       [] Vaso  [x] Manual (84628) x   3    [] Ultrasound  [x] TA x   3     [] Mech Traction (40310)  [] Aquatic Therapy x     [] ES (un) (66928):   [] Home Management Training x  [] ES(attended) (15293)   [] Dry Needling 1-2 muscles (08067):  [] Dry Needling 3+ muscles (647798  [] Group:      [] Other: gaitx1    GOALS:  Patient stated goal: \"Pt wants to be able to stand with his walker and walk household distances\"  [x] Progressing: [] Met: [] Not Met: [] Adjusted     Therapist goals for Patient:   Short Term Goals: To be achieved in: 2 weeks  1. Independent in HEP and progression per patient tolerance, in order to prevent return of swelling   [] Progressing: [x] Met: [] Not Met: [] Adjusted  2.  Patient will have a decrease in swelling/pain to facilitate improvement in movement, function, and ADLs as indicated by improvement with LLIS. [] Progressing: [x] Met: [] Not Met: [] Adjusted     Long Term Goals: To be achieved in: 6 weeks  1. FOTO score of at least 46 to assist with reaching prior level of function. [] Progressing: [x] Met: [] Not Met: [] Adjusted  2. Patient will demonstrate increased AROM/strength of BLE to 4/5 so that pt can resume walking and standing without increase in symptoms. [x] Progressing: [] Met: [] Not Met: [] Adjusted  3. Decrease swelling of BLEs by 5cm so that pt can return to functional activities including standing, walking, squatting, and lifting without increased symptoms or restriction. [x] Progressing: [] Met: [] Not Met: [] Adjusted  4. Patient will be able to stand for >/= 5 minutes in order to improve standing tolerance for performing ADLs. [x] Progressing: [] Met: [] Not Met: [] Adjusted  5. Patient will be able to ambulate short community distances (300-500ft) with the LRAD to improve functional mobility in his home and community. [x] Progressing: [] Met: [] Not Met: [] Adjusted    Overall Progression Towards Functional goals/ Treatment Progress Update:  [x] Patient is progressing as expected towards functional goals listed. [] Progression is slowed due to complexities/Impairments listed. [] Progression has been slowed due to co-morbidities.   [] Plan just implemented, too soon to assess goals progression <30days   [] Goals require adjustment due to lack of progress  [] Patient is not progressing as expected and requires additional follow up with physician  [] Other    Persisting Functional Limitations/Impairments:  []Sleeping []Sitting               [x]Standing [x]Transfers        [x]Walking [x]Kneeling               [x]Stairs [x]Squatting / bending   [x]ADLs []Reaching  [x]Lifting  [x]Housework  []Driving []Job related tasks  []Sports/Recreation []Other:        ASSESSMENT:  PN completed this date. Pt has been compliant with home program and HEP. Pt with decreased edema in RLE total girth. Decreased edema in L foot, edema increased proximally. Educated pt to have nurse wrap tighter proximally on LLE. Continue with MLD and compression and strength and ROM exercise to improve functional mobility and return to PLOF without limitations. Treatment/Activity Tolerance:  [x] Patient able to complete tx [] Patient limited by fatigue  [] Patient limited by pain  [] Patient limited by other medical complications  [] Other:     Prognosis: [x] Good [] Fair  [] Poor    Patient Requires Follow-up: [x] Yes  [] No    Plan for next treatment session:   MLD, compression - bandage B LE below knee , HEP, pt education, lymph pump  B LE, lymph pump for home to help prevent chronic wounds R>L LE, exercise to stimulate lymphatic flow, LE ex, balance, gait, fxnl mobility    PLAN: See eval. PT 2x / week for 6 weeks. [x] Continue per plan of care [] Alter current plan (see comments)  [] Plan of care initiated [] Hold pending MD visit [] Discharge    Electronically signed by: Yamilet Plaza, PT, DPT      Note: If patient does not return for scheduled/ recommended follow up visits, this note will serve as a discharge from care along with most recent update on progress.

## 2022-08-05 ENCOUNTER — HOSPITAL ENCOUNTER (OUTPATIENT)
Dept: PHYSICAL THERAPY | Age: 77
Setting detail: THERAPIES SERIES
Discharge: HOME | End: 2022-08-05
Payer: COMMERCIAL

## 2022-08-05 PROCEDURE — 97140 MANUAL THERAPY 1/> REGIONS: CPT

## 2022-08-05 PROCEDURE — 97530 THERAPEUTIC ACTIVITIES: CPT

## 2022-08-05 NOTE — FLOWSHEET NOTE
[address] [facility] Physical Therapy  Phone: [phone]   Fax: [phone]    Physical Therapy Daily LYMPHEDEMA Treatment Note    Date:  2022    Patient Name:  David Tineo    :  1945  MRN: [id number]  Restrictions/Precautions:    Medical/Treatment Diagnosis Information:  Diagnosis: BILATERAL LOWER EXTREMITY LYMPHEDEMA  Treatment Diagnosis: B LE chronic swelling and wounds R > L and decreased B LE strength, decreased functional mobilityPT diagnosis:  Insurance/Certification information:  PT Insurance Information: [address] [place] [address] - no auth/no copay/BMN  Physician Information:  Ira Beckford NP    Plan of care signed (Y/N): []  Yes [x]  No     Date of Patient follow up with Physician:       Progress Report: []  Yes  [x]  No     Date Range for reporting period:  Beginnin22  PT POC 22 - faxed for signature  Ending    Progress report due (10 Rx/or 30 days whichever is less): visit #10 or      Recertification due (POC duration/ or 90 days whichever is less): 22    Visit # Insurance Allowable Auth required? Date Range    +  BMN []  Yes  [x]  No pcy        Latex Allergy:  [x]NO      []YES  Preferred Language for Healthcare:   [x]English       []other:      Functional Scale:                                                                                                      Date assessed:  FOTO physical FS primary measure score = 39; risk adjusted = 47                  22  FOTO physical FS primary measure score = 46                 22    Pain level:  0/10 BLEs    SUBJECTIVE:  Pt reports 2 wounds on RLE have healed. Feels his swelling is going down.      OBJECTIVE:     GIRTH MEASUREMENT  (Tape on skin along anterior tibialis)     Lower Extremity Right (cm) Left  (cm) Right (cm) Left (cm)   Date 22 8/2   Measurements taken as follows: 0 cm at inf aspect of lateral malleolus and marked on    [] Ant change. Pt states he is on oral antibiotic      Multilayer compression bandaging to BLE  Stockinette     - held this date due to wounds    8 cm low stretch compression bandage  12 cm low stretch compression bandage    7/29 unwrapped lower extremities. Discussed increased compression at foot and wrapping distal to proximal. Wrote notes for pt's nurse. 8/5, 8/2 unwrapped. Educated to increase compression proximally     PN completed 8/2 - objective measures taken. Discussed progress made with therapy.         X 30 min   7/26 educated pt and Arelis (wound care nurse) on bandaging    Ambulation  91 ft with RW    7/21 pt SOB after   HEP   - LLD knee ext stretch   - quad set   - ankle pump  - LAQ      Transfer w/c <> bed - SBA  VC for knee ext    Nu step  3.5 min + 3.5 min   Wearing multilayer bandaging   Rest break           Home Management  (providing pt education on safety procedures/instructions)       Pt educated on compression as follows:   how to appropriately apply and wear compression  how to maintain appropriate gradient compression  do not sleep with compression garment/tensoshape  signs and symptoms of constriction   when to remove compression       Pt educated on lymphedema prevention and management    7/12 completed during MLD    Pt educated on MLD                            Neuromuscular Re-ed (84115)       balance                     Manual Intervention ([id number])      See MLD flowchart below   X 45 min  7/21 deferred due to concern re possible cellulitis                                        Manual Lymph Drainage (MLD):  MLD to B LE , clearing along alternate pathway of  Ipsilateral trunk  to  Ipsilateral axillary  lymph nodes    Clear Nodes 10x each   Neck x   [address]    Axilla x   Abdomen x   Groin x   Popliteal x2 x   Clear Alternate Pathway 10x each   Re-clear alternate pathway   x5 each position x   Location Ipsilateral trunk       Fluid Mobilization 10x each   Re-clear alternate pathway   x5 each position x   Shoulder bracing    Location B LE and ipsilateral trunk       Protein Resorption 10x each   Location LE below knee       Clear Foot/Ankle or Hand 10x each   Achilles x   Bilateral malleolus x   Fan the cards x   Clear dorsum x   Clear through web space x   Clear toes/fingers x   Fluid mobilization x   Re-clear all positions  X5 each x       Modalities:     OTHER:   7/21 pt signed release of info form for PT to communicate with medical staff at [facility]     7/21 PT called Thierry Driscoll NP  re pt- discussed concern re possible new onset cellulitis R and or L LE, new onset wounds, inappropriate compression gradient with 2 tensoshapes that rolled. Anam Mccallum  states she will call in antibiotic. At pt request, PT called RN Benji Stephens [phone]; unable to LM due to VM full. [facility] at [phone] - no answer, no VM. PT wrote note to RNs a pt's independent living facility re need for approp compression gradient and pt will order lymphedema bandages - low stretch 2x8cm, 4x12 cm, 2x15 cm artiflex. 6/30 pt signed release of info for PT to send PT notes and insurance info to [facility] re possibility of getting lymphedema pump for B LE    Pt education:   7/21 extensive pt ed re appropriate compression and compression gradient, need to avoid constriction    6/30   pt ed and provided with form on what compression wrappings to purchase. Ed on ankle pumps and toe flx/ext to stimulate lymphatic flow. Pt educated on pathology and anatomy of etiology of lymphedema, condition precautions, indications for long term prognosis. Pt was educated on diagnosis; prognosis; PT POC including MLD, compression (role of multilayer bandaging/ compression garments), lymphedema management/prevention of flare ups, role of exercise, HEP, lymphedema pump; expectations for rehab. All pt questions were answered.       HEP instruction:  7/26 Access Code: [email or web address]  URL: [email or web address]  Date: 07/26/2022  Prepared by: Daphne Arriaga    Exercises  Long Sitting Quad Set - 3 x daily - 7 x weekly - 1 sets - 10 reps - 5 sec hold  Supine Knee Extension Stretch on Towel Roll - 3 x daily - 7 x weekly - 5-6 min hold    7/21 pt to order low stretch compression bandages: 2x8cm, 4x12 cm and artiflex 2x15 cm and bring to next appt. Encouraged pt to try to plan for a staff member to come to appt to be instructed in lymphedema wrapping and approp comrpession gradient    Eval: Pt instructed in lymphedema management/prevention concepts and swipe method of MLD, ankle pumps, toe DF/PF    Therapeutic Exercise and NMR EXR  [] (39643) Provided verbal/tactile cueing for activities related to strengthening, flexibility, endurance, ROM for improvements in  [] LE / Lumbar: LE, proximal hip, and core control with self care, mobility, lifting, ambulation. [] UE / Cervical: cervical, postural, scapular, scapulothoracic and UE control with self care, reaching, carrying, lifting, house/yardwork, driving, computer work.  [] (84479) Provided verbal/tactile cueing for activities related to improving balance, coordination, kinesthetic sense, posture, motor skill, proprioception to assist with   [] LE / lumbar: LE, proximal hip, and core control in self care, mobility, lifting, ambulation and eccentric single leg control. [] UE / cervical: cervical, scapular, scapulothoracic and UE control with self care, reaching, carrying, lifting, house/yardwork, driving, computer work.   [] (61281) Therapist is in constant attendance of 2 or more patients providing skilled therapy interventions, but not providing any significant amount of measurable one-on-one time to either patient, for improvements in  [] LE / lumbar: LE, proximal hip, and core control in self care, mobility, lifting, ambulation and eccentric single leg control.    [] UE / cervical: cervical, scapular, scapulothoracic and UE control with self care, reaching, carrying, lifting, house/yardwork, driving, computer work. NMR and Therapeutic Activities:    [x] (05326 or 76743) Provided verbal/tactile cueing for activities related to improving balance, coordination, kinesthetic sense, posture, motor skill, proprioception and motor activation to allow for proper function of   [x] LE: / Lumbar core, proximal hip and LE with self care and ADLs  [] UE / Cervical: cervical, postural, scapular, scapulothoracic and UE control with self care, carrying, lifting, driving, computer work.   [] (67348) Gait Re-education- Provided training and instruction to the patient for proper LE, core and proximal hip recruitment and positioning and eccentric body weight control with ambulation re-education including up and down stairs     Home Management Training / Self Care:  [] (26808) Provided self-care/home management training related to activities of daily living and compensatory training, and/or use of adaptive equipment for improvement with: ADLs and compensatory training, meal preparation, safety procedures and instruction in use of adaptive equipment, including bathing, grooming, dressing, personal hygiene, basic household cleaning and chores.      Home Exercise Program:    [] (10159) Reviewed/Progressed HEP activities related to strengthening, flexibility, endurance, ROM of   [] LE / Lumbar: core, proximal hip and LE for functional self-care, mobility, lifting and ambulation/stair navigation   [] UE / Cervical: cervical, postural, scapular, scapulothoracic and UE control with self care, reaching, carrying, lifting, house/yardwork, driving, computer work  [] (99302)Reviewed/Progressed HEP activities related to improving balance, coordination, kinesthetic sense, posture, motor skill, proprioception of   [] LE: core, proximal hip and LE for self care, mobility, lifting, and ambulation/stair navigation    [] UE / Cervical: cervical, postural, scapular, scapulothoracic and UE control with self care, reaching, carrying, lifting, house/yardwork, driving, computer work    Manual Treatments:  PROM / STM / Oscillations-Mobs:  G-I, II, III, IV (PA's, Inf., Post.)  [x] (00756) Provided manual therapy to mobilize LE, proximal hip and/or LS spine soft tissue/joints for the purpose of modulating pain, promoting relaxation,  increasing ROM, reducing/eliminating soft tissue swelling/inflammation/restriction, improving soft tissue extensibility and allowing for proper ROM for normal function with   [x] LE / lumbar: self care, mobility, lifting and ambulation. [] UE / Cervical: self care, reaching, carrying, lifting, house/yardwork, driving, computer work. Modalities:  [] (18518) Vasopneumatic compression: Utilized vasopneumatic compression to decrease edema / swelling for the purpose of improving mobility and quad tone / recruitment which will allow for increased overall function including but not limited to self-care, transfers, ambulation, and ascending / descending stairs. Charges:  Timed Code Treatment Minutes: 90   Total Treatment Minutes: 90     [] EVAL - LOW (97596)   [] EVAL - MOD (63295)  [] EVAL - HIGH (64294)  [] RE-EVAL (09996)  [] UB(04772) x   1    [] Ionto  [] NMR (79094) x       [] Vaso  [x] Manual (81290) x   3    [] Ultrasound  [x] TA x   3     [] Mech Traction (71778)  [] Aquatic Therapy x     [] ES (un) (28912):   [] Home Management Training x  [] ES(attended) (67520)   [] Dry Needling 1-2 muscles (98672):  [] Dry Needling 3+ muscles (831641  [] Group:      [] Other: gaitx1    GOALS:  Patient stated goal: \"Pt wants to be able to stand with his walker and walk household distances\"  [x] Progressing: [] Met: [] Not Met: [] Adjusted     Therapist goals for Patient:   Short Term Goals: To be achieved in: 2 weeks  1.  Independent in HEP and progression per patient tolerance, in order to prevent return of swelling   [] Progressing: [x] Met: [] Not Met: [] Adjusted  2. Patient will have a decrease in swelling/pain to facilitate improvement in movement, function, and ADLs as indicated by improvement with LLIS. [] Progressing: [x] Met: [] Not Met: [] Adjusted     Long Term Goals: To be achieved in: 6 weeks  1. FOTO score of at least 46 to assist with reaching prior level of function. [] Progressing: [x] Met: [] Not Met: [] Adjusted  2. Patient will demonstrate increased AROM/strength of BLE to 4/5 so that pt can resume walking and standing without increase in symptoms. [x] Progressing: [] Met: [] Not Met: [] Adjusted  3. Decrease swelling of BLEs by 5cm so that pt can return to functional activities including standing, walking, squatting, and lifting without increased symptoms or restriction. [x] Progressing: [] Met: [] Not Met: [] Adjusted  4. Patient will be able to stand for >/= 5 minutes in order to improve standing tolerance for performing ADLs. [x] Progressing: [] Met: [] Not Met: [] Adjusted  5. Patient will be able to ambulate short community distances (300-500ft) with the LRAD to improve functional mobility in his home and community. [x] Progressing: [] Met: [] Not Met: [] Adjusted    Overall Progression Towards Functional goals/ Treatment Progress Update:  [x] Patient is progressing as expected towards functional goals listed. [] Progression is slowed due to complexities/Impairments listed. [] Progression has been slowed due to co-morbidities.   [] Plan just implemented, too soon to assess goals progression <30days   [] Goals require adjustment due to lack of progress  [] Patient is not progressing as expected and requires additional follow up with physician  [] Other    Persisting Functional Limitations/Impairments:  []Sleeping []Sitting               [x]Standing [x]Transfers        [x]Walking [x]Kneeling               [x]Stairs [x]Squatting / bending   [x]ADLs []Reaching  [x]Lifting  [x]Housework  []Driving []Job related tasks  []Sports/Recreation []Other:        ASSESSMENT:  Pt with decreased edema in BLEs. Wounds healed on RLE. Initiated nu step wearing compression bandages this date. Reports some SOB - O2 sat 96%. Seated rest completed on nu step. Continue with MLD, compression, exercise, and addition of lymphedema pump to decrease edema and risk for infection and improve functional mobility. Treatment/Activity Tolerance:  [x] Patient able to complete tx [] Patient limited by fatigue  [] Patient limited by pain  [] Patient limited by other medical complications  [] Other:     Prognosis: [x] Good [] Fair  [] Poor    Patient Requires Follow-up: [x] Yes  [] No    Plan for next treatment session:   MLD, compression - bandage B LE below knee , HEP, pt education, lymph pump  B LE, lymph pump for home to help prevent chronic wounds R>L LE, exercise to stimulate lymphatic flow, LE ex, balance, gait, fxnl mobility    PLAN: See eval. PT 2x / week for 6 weeks. [x] Continue per plan of care [] Alter current plan (see comments)  [] Plan of care initiated [] Hold pending MD visit [] Discharge    Electronically signed by: Claudia Solorzano, PT, DPT      Note: If patient does not return for scheduled/ recommended follow up visits, this note will serve as a discharge from care along with most recent update on progress.

## 2022-08-09 ENCOUNTER — HOSPITAL ENCOUNTER (OUTPATIENT)
Dept: PHYSICAL THERAPY | Age: 77
Setting detail: THERAPIES SERIES
Discharge: HOME | End: 2022-08-09
Payer: COMMERCIAL

## 2022-08-09 PROCEDURE — 97140 MANUAL THERAPY 1/> REGIONS: CPT

## 2022-08-09 PROCEDURE — 97530 THERAPEUTIC ACTIVITIES: CPT

## 2022-08-09 PROCEDURE — 97110 THERAPEUTIC EXERCISES: CPT

## 2022-08-09 NOTE — FLOWSHEET NOTE
faster. Wonders if he has a detergent sensitivity/allergy    OBJECTIVE:   8/9 overall reduction in swelling and wounds healing. Distal swelling present at R foot and toes - needs improved compression gradient with bandages  NEW Blister opened R ant/lat shin: 1.3cm x 0.9cm. new red area R dorsal foot: 2\" x 1.5\" (Not open). PT cleaned opened blister area with alcohol wipe and covered with tegaderm - communicated to pt's RN on communication form  8/2  GIRTH MEASUREMENT  (Tape on skin along anterior tibialis)     Lower Extremity Right (cm) Left  (cm) Right (cm) Left (cm)   Date 6/30/22 6/30/22 8/2 8/2   Measurements taken as follows: 0 cm at inf aspect of lateral malleolus and marked on    [] Ant aspect of LE    [x] Lateral aspect of LE    [] sheet                   cm  Widest at hip         cm at waist (at umbilicus), measured while reclined on hospital bed 132.0 cm                 Metatarsal heads 25.7cm 25.7cm 26.1 25.5   0 Cm above inf aspect of  LATERAL MALLEOLUS 37.8 35.5 cm 34.9 32.5    10 Cm above inf aspect of  LATERAL MALLEOLUS    38.7 33.4cm 39.1 30.6   20 Cm above  inf aspec of LATERAL MALLEOLUS 45.3 38.5 44.4 39.8   30 Cm above  inf aspect of  LATERAL MALLEOLUS 46.4 41.4 45.5 46.8             Total Girth 193.9 174.5 190.0 175.2     RLE decreased total girth by 3.9 cm   LLE increased total girth by 0.7 cm     7/21   Pt to clinic wearing 2 tensoshape each LE from mid foot to below knee. Signs of constriction present B LE ant ankle and R LE below knee - inappropriate compression gradient. Dorsal foot swelling increased B - due to constriction at ankles from rolled and doubled tensoshapes  causing constriction  multiple new wounds present B LEs:  L medial calf: 1.5 x 0.8cm, red area L ant ankle: 2.7cnx2.0 cm; R ant shin: 1.7cmx1.7cm, R ant ankle: not yet open but very red: 3.5cmx2.5 with black area in center of red area: 1.0cm x 0.9cm  Redness present B dorsal feet and R LE below knee.  Increased warmth R Management  (providing pt education on safety procedures/instructions)       Pt educated on compression as follows:   how to appropriately apply and wear compression  how to maintain appropriate gradient compression  do not sleep with compression garment/tensoshape  signs and symptoms of constriction   when to remove compression       Pt educated on lymphedema prevention and management    7/12 completed during MLD    Pt educated on MLD                            Neuromuscular Re-ed (03483)       balance                     Manual Intervention (61644)      See MLD flowchart below   X35 min  7/21 deferred due to concern re possible cellulitis                                        Manual Lymph Drainage (MLD):  MLD to B LE , clearing along alternate pathway of  Ipsilateral trunk  to  Ipsilateral axillary  lymph nodes    Clear Nodes 10x each   Neck x   [address]    Axilla x   Abdomen x   Groin x   Popliteal x2 x   Clear Alternate Pathway 10x each   Re-clear alternate pathway   x5 each position x   Location Ipsilateral trunk       Fluid Mobilization 10x each   Re-clear alternate pathway   x5 each position x   Shoulder bracing    Location B LE and ipsilateral trunk       Protein Resorption 10x each   Location LE below knee       Clear Foot/Ankle or Hand 10x each   Achilles x   Bilateral malleolus x   Fan the cards x   Clear dorsum x   Clear through web space x   Clear toes/fingers x   Fluid mobilization x   Re-clear all positions  X5 each x       Modalities:     OTHER:   7/21 pt signed release of info form for PT to communicate with medical staff at [facility]     7/21 PT called Ivan Almeida NP  re pt- discussed concern re possible new onset cellulitis R and or L LE, new onset wounds, inappropriate compression gradient with 2 tensoshapes that rolled. [facility]  states she will call in antibiotic. At pt request, PT called SHANIA Mccarty [phone]; unable to LM due to VM full. [facility] director [facility] nursing next at [phone] - no answer, no VM. PT wrote note to RNs a pt's independent living facility re need for approp compression gradient and pt will order lymphedema bandages - low stretch 2x8cm, 4x12 cm, 2x15 cm artiflex. 6/30 pt signed release of info for PT to send PT notes and insurance info to [facility] re possibility of getting lymphedema pump for B LE    Pt education:   8/9 review HEP - see below d/w pt benefit of rewrapping daily, how to void constriction marks and gradually increasing walking duration/distance- wrote this on his note to RN  7/21 extensive pt ed re appropriate compression and compression gradient, need to avoid constriction    6/30   pt ed and provided with form on what compression wrappings to purchase. Ed on ankle pumps and toe flx/ext to stimulate lymphatic flow. Pt educated on pathology and anatomy of etiology of lymphedema, condition precautions, indications for long term prognosis. Pt was educated on diagnosis; prognosis; PT POC including MLD, compression (role of multilayer bandaging/ compression garments), lymphedema management/prevention of flare ups, role of exercise, HEP, lymphedema pump; expectations for rehab. All pt questions were answered. HEP instruction:  8/9 increase duration of walks by 15-60 sec; goal - increase walk duration so he can do 1-2 laps at facility. Re-wrap daily as able- trial of grey foam at ankles    7/26   Access Code: [email or web address]  URL: [email or web address]  Date: 07/26/2022  Prepared by: Max Meza    Exercises  Long Sitting Quad Set - 3 x daily - 7 x weekly - 1 sets - 10 reps - 5 sec hold  Supine Knee Extension Stretch on Towel Roll - 3 x daily - 7 x weekly - 5-6 min hold    7/21 pt to order low stretch compression bandages: 2x8cm, 4x12 cm and artiflex 2x15 cm and bring to next appt.  Encouraged pt to try to plan for a staff member to come to appt to be instructed in lymphedema wrapping and approp comrpession gradient    Eval: Pt instructed in lymphedema management/prevention concepts and swipe method of MLD, ankle pumps, toe DF/PF    Therapeutic Exercise and NMR EXR  [] (93828) Provided verbal/tactile cueing for activities related to strengthening, flexibility, endurance, ROM for improvements in  [] LE / Lumbar: LE, proximal hip, and core control with self care, mobility, lifting, ambulation. [] UE / Cervical: cervical, postural, scapular, scapulothoracic and UE control with self care, reaching, carrying, lifting, house/yardwork, driving, computer work.  [] (19505) Provided verbal/tactile cueing for activities related to improving balance, coordination, kinesthetic sense, posture, motor skill, proprioception to assist with   [] LE / lumbar: LE, proximal hip, and core control in self care, mobility, lifting, ambulation and eccentric single leg control. [] UE / cervical: cervical, scapular, scapulothoracic and UE control with self care, reaching, carrying, lifting, house/yardwork, driving, computer work.   [] (03497) Therapist is in constant attendance of 2 or more patients providing skilled therapy interventions, but not providing any significant amount of measurable one-on-one time to either patient, for improvements in  [] LE / lumbar: LE, proximal hip, and core control in self care, mobility, lifting, ambulation and eccentric single leg control. [] UE / cervical: cervical, scapular, scapulothoracic and UE control with self care, reaching, carrying, lifting, house/yardwork, driving, computer work.      NMR and Therapeutic Activities:    [x] (12873 or 03117) Provided verbal/tactile cueing for activities related to improving balance, coordination, kinesthetic sense, posture, motor skill, proprioception and motor activation to allow for proper function of   [x] LE: / Lumbar core, proximal hip and LE with self care and ADLs  [] UE / Cervical: cervical, postural, scapular, scapulothoracic and UE control with self care, carrying, lifting, driving, computer work.   [] (99129) Gait Re-education- Provided training and instruction to the patient for proper LE, core and proximal hip recruitment and positioning and eccentric body weight control with ambulation re-education including up and down stairs     Home Management Training / Self Care:  [] (47941) Provided self-care/home management training related to activities of daily living and compensatory training, and/or use of adaptive equipment for improvement with: ADLs and compensatory training, meal preparation, safety procedures and instruction in use of adaptive equipment, including bathing, grooming, dressing, personal hygiene, basic household cleaning and chores.      Home Exercise Program:    [] (07051) Reviewed/Progressed HEP activities related to strengthening, flexibility, endurance, ROM of   [] LE / Lumbar: core, proximal hip and LE for functional self-care, mobility, lifting and ambulation/stair navigation   [] UE / Cervical: cervical, postural, scapular, scapulothoracic and UE control with self care, reaching, carrying, lifting, house/yardwork, driving, computer work  [] (88582)Reviewed/Progressed HEP activities related to improving balance, coordination, kinesthetic sense, posture, motor skill, proprioception of   [] LE: core, proximal hip and LE for self care, mobility, lifting, and ambulation/stair navigation    [] UE / Cervical: cervical, postural,  scapular, scapulothoracic and UE control with self care, reaching, carrying, lifting, house/yardwork, driving, computer work    Manual Treatments:  PROM / STM / Oscillations-Mobs:  G-I, II, III, IV (PA's, Inf., Post.)  [x] (74937) Provided manual therapy to mobilize LE, proximal hip and/or LS spine soft tissue/joints for the purpose of modulating pain, promoting relaxation,  increasing ROM, reducing/eliminating soft tissue swelling/inflammation/restriction, improving soft tissue extensibility and allowing for proper ROM for normal function with   [x] LE / lumbar: self care, mobility, lifting and ambulation. [] UE / Cervical: self care, reaching, carrying, lifting, house/yardwork, driving, computer work. Modalities:  [] (98247) Vasopneumatic compression: Utilized vasopneumatic compression to decrease edema / swelling for the purpose of improving mobility and quad tone / recruitment which will allow for increased overall function including but not limited to self-care, transfers, ambulation, and ascending / descending stairs. Charges:  Timed Code Treatment Minutes: 90   Total Treatment Minutes: 90     [] EVAL - LOW (71597)   [] EVAL - MOD (16282)  [] EVAL - HIGH (48274)  [] RE-EVAL (44449)  [x] FS(23849) x   1    [] Ionto  [] NMR (99514) x       [] Vaso  [x] Manual (53111) x   2   [] Ultrasound  [x] TA x   3     [] Mech Traction (06592)  [] Aquatic Therapy x     [] ES (un) (13957):   [] Home Management Training x  [] ES(attended) (00391)   [] Dry Needling 1-2 muscles (57910):  [] Dry Needling 3+ muscles (350482  [] Group:      [] Other: gaitx1    GOALS:  Patient stated goal: \"Pt wants to be able to stand with his walker and walk household distances\"  [x] Progressing: [] Met: [] Not Met: [] Adjusted     Therapist goals for Patient:   Short Term Goals: To be achieved in: 2 weeks  1. Independent in HEP and progression per patient tolerance, in order to prevent return of swelling   [] Progressing: [x] Met: [] Not Met: [] Adjusted  2. Patient will have a decrease in swelling/pain to facilitate improvement in movement, function, and ADLs as indicated by improvement with LLIS. [] Progressing: [x] Met: [] Not Met: [] Adjusted     Long Term Goals: To be achieved in: 6 weeks  1. FOTO score of at least 46 to assist with reaching prior level of function. [] Progressing: [x] Met: [] Not Met: [] Adjusted  2.  Patient will demonstrate increased AROM/strength of BLE to 4/5 so that pt can resume walking and completed on nu step. Continue with MLD, compression, exercise, and addition of lymphedema pump to decrease edema and risk for infection and improve functional mobility. Treatment/Activity Tolerance:  [x] Patient able to complete tx [] Patient limited by fatigue  [] Patient limited by pain  [] Patient limited by other medical complications  [] Other:     Prognosis: [x] Good [] Fair  [] Poor    Patient Requires Follow-up: [x] Yes  [] No    Plan for next treatment session:   MLD, compression - bandage B LE below knee , HEP, pt education, lymph pump  B LE, lymph pump for home to help prevent chronic wounds R>L LE, exercise to stimulate lymphatic flow, LE ex, balance, gait, fxnl mobility    PLAN: See eval. PT 2x / week for 6 weeks. [x] Continue per plan of care [] Alter current plan (see comments)  [] Plan of care initiated [] Hold pending MD visit [] Discharge    Electronically signed by: Madonna Hare, PT, DPT      Note: If patient does not return for scheduled/ recommended follow up visits, this note will serve as a discharge from care along with most recent update on progress.

## 2022-08-12 ENCOUNTER — HOSPITAL ENCOUNTER (OUTPATIENT)
Dept: PHYSICAL THERAPY | Age: 77
Setting detail: THERAPIES SERIES
Discharge: HOME | End: 2022-08-12
Payer: COMMERCIAL

## 2022-08-12 PROCEDURE — 97140 MANUAL THERAPY 1/> REGIONS: CPT

## 2022-08-12 PROCEDURE — 97530 THERAPEUTIC ACTIVITIES: CPT

## 2022-08-12 NOTE — FLOWSHEET NOTE
[address] [facility] Physical Therapy  Phone: [phone]   Fax: [phone]    Physical Therapy Daily LYMPHEDEMA Treatment Note    Date:  2022    Patient Name:  Jazzmine Phan    :  1945  MRN: [id number]  Restrictions/Precautions:    Medical/Treatment Diagnosis Information:  Diagnosis: BILATERAL LOWER EXTREMITY LYMPHEDEMA  Treatment Diagnosis: B LE chronic swelling and wounds R > L and decreased B LE strength, decreased functional mobilityPT diagnosis:  Insurance/Certification information:  PT Insurance Information: [address] [place] [address] - no auth/no copay/BMN  Physician Information:  Enrrique Harrington NP    Plan of care signed (Y/N): []  Yes [x]  No     Date of Patient follow up with Physician:       Progress Report: []  Yes  [x]  No     Date Range for reporting period:  Beginnin22  PT POC 22 - faxed for signature  Ending    Progress report due (10 Rx/or 30 days whichever is less): visit #10 or      Recertification due (POC duration/ or 90 days whichever is less): 22    Visit # Insurance Allowable Auth required? Date Range   10/12 +  BMN []  Yes  [x]  No pcy        Latex Allergy:  [x]NO      []YES  Preferred Language for Healthcare:   [x]English       []other:      Functional Scale:                                                                                                      Date assessed:  FOTO physical FS primary measure score = 39; risk adjusted = 47                  22  FOTO physical FS primary measure score = 46                 22    Pain level:  0/10 BLEs    SUBJECTIVE:  Pt reports he is having less pain today. Remaining 12 in bandages came in. Reports his bandages are falling down as he has less swelling. OBJECTIVE:    overall reduction in swelling and wounds healing.   Distal swelling present at R foot and toes - needs improved compression gradient with bandages  NEW Blister opened R ant/lat shin: 1.3cm x CKC LE ex at // bars or counter       Ankle pumps  Toe DF/PF  Gastroc stretch with active DF                    Therapeutic Activities (56240)  (Dynamic activities such as compression, designed to improve functional performance)       Transfers:  sit to/from stand from  and hospital bed 1x     VC for knee, hip, and trunk ext    Applied dressing to wound: PT cleaned area with alcohol wipe and covered with bandage 3 min       Compression: trial tensoshape size   applied to B LE - XL   7/15 Educated to follow up with wound care nurse when he returns home due to weeping and need for dressing change. Pt states he is on oral antibiotic      Multilayer compression bandaging to BLE  Stockinette    Horseshoe at B ankles    - held this date due to wounds    8 cm low stretch compression bandage  12 cm low stretch compression bandage  12 cm low stretch compression bandage    7/29 unwrapped lower extremities. Discussed increased compression at foot and wrapping distal to proximal. Wrote notes for pt's nurse. 8/5, 8/2 unwrapped. Educated to increase compression proximally     PN completed 8/2 - objective measures taken. Discussed progress made with therapy.         X 25 min    7/26 educated pt and Arelis (wound care nurse) on bandaging    Ambulation  95 ft with RW   54 Ft with RW after nustep    7/21 pt SOB after   HEP   - LLD knee ext stretch   - quad set   - ankle pump  - LAQ      Transfer w/c > bed - SBA X 1  VC for knee ext    Nu step   S=11, arms =10, workload =3 7 min   Wearing multilayer bandaging   Rest break           Home Management  (providing pt education on safety procedures/instructions)       Pt educated on compression as follows:   how to appropriately apply and wear compression  how to maintain appropriate gradient compression  do not sleep with compression garment/tensoshape  signs and symptoms of constriction   when to remove compression       Pt educated on lymphedema prevention and management    7/12 completed during MLD    Pt educated on MLD                            Neuromuscular Re-ed (22080)       balance                     Manual Intervention (00536)      See MLD flowchart below   X 60 min  7/21 deferred due to concern re possible cellulitis                                        Manual Lymph Drainage (MLD):  MLD to B LE , clearing along alternate pathway of  Ipsilateral trunk  to  Ipsilateral axillary  lymph nodes    Clear Nodes 10x each   Neck x   [address]    Axilla x   Abdomen x   Groin x   Popliteal x2 x   Clear Alternate Pathway 10x each   Re-clear alternate pathway   x5 each position x   Location Ipsilateral trunk       Fluid Mobilization 10x each   Re-clear alternate pathway   x5 each position x   Shoulder bracing    Location B LE and ipsilateral trunk       Protein Resorption 10x each   Location LE below knee       Clear Foot/Ankle or Hand 10x each   Achilles x   Bilateral malleolus x   Fan the cards x   Clear dorsum x   Clear through web space x   Clear toes/fingers x   Fluid mobilization x   Re-clear all positions  X5 each x       Modalities:     OTHER:   7/21 pt signed release of info form for PT to communicate with medical staff at [facility] where he lives)     7/21 PT called Daya De Leon NP  re pt- discussed concern re possible new onset cellulitis R and or L LE, new onset wounds, inappropriate compression gradient with 2 tensoshapes that rolled. Marchia James  states she will call in antibiotic. At pt request, PT called SHANIA [facility] [phone]; unable to LM due to VM full. [facility] at [phone] - no answer, no VM. PT wrote note to RNs a pt's independent living facility re need for approp compression gradient and pt will order lymphedema bandages - low stretch 2x8cm, 4x12 cm, 2x15 cm artiflex.       6/30 pt signed release of info for PT to send PT notes and insurance info to [facility] [place] re possibility of getting lymphedema pump for B LE    Pt education:   8/9 review HEP - see below d/w pt benefit of rewrapping daily, how to void constriction marks and gradually increasing walking duration/distance- wrote this on his note to RN  7/21 extensive pt ed re appropriate compression and compression gradient, need to avoid constriction    6/30   pt ed and provided with form on what compression wrappings to purchase. Ed on ankle pumps and toe flx/ext to stimulate lymphatic flow. Pt educated on pathology and anatomy of etiology of lymphedema, condition precautions, indications for long term prognosis. Pt was educated on diagnosis; prognosis; PT POC including MLD, compression (role of multilayer bandaging/ compression garments), lymphedema management/prevention of flare ups, role of exercise, HEP, lymphedema pump; expectations for rehab. All pt questions were answered. HEP instruction:  8/9 increase duration of walks by 15-60 sec; goal - increase walk duration so he can do 1-2 laps at facility. Re-wrap daily as able- trial of grey foam at ankles    7/26   Access Code: [email or web address]  URL: [email or web address]  Date: 07/26/2022  Prepared by: Flako Matias    Exercises  Long Sitting Quad Set - 3 x daily - 7 x weekly - 1 sets - 10 reps - 5 sec hold  Supine Knee Extension Stretch on Towel Roll - 3 x daily - 7 x weekly - 5-6 min hold    7/21 pt to order low stretch compression bandages: 2x8cm, 4x12 cm and artiflex 2x15 cm and bring to next appt.  Encouraged pt to try to plan for a staff member to come to appt to be instructed in lymphedema wrapping and approp comrpession gradient    Eval: Pt instructed in lymphedema management/prevention concepts and swipe method of MLD, ankle pumps, toe DF/PF    Therapeutic Exercise and NMR EXR  [] (63477) Provided verbal/tactile cueing for activities related to strengthening, flexibility, endurance, ROM for improvements in  [] LE / Lumbar: LE, proximal hip, and core control with self care, management training related to activities of daily living and compensatory training, and/or use of adaptive equipment for improvement with: ADLs and compensatory training, meal preparation, safety procedures and instruction in use of adaptive equipment, including bathing, grooming, dressing, personal hygiene, basic household cleaning and chores. Home Exercise Program:    [] (08810) Reviewed/Progressed HEP activities related to strengthening, flexibility, endurance, ROM of   [] LE / Lumbar: core, proximal hip and LE for functional self-care, mobility, lifting and ambulation/stair navigation   [] UE / Cervical: cervical, postural, scapular, scapulothoracic and UE control with self care, reaching, carrying, lifting, house/yardwork, driving, computer work  [] (39929)Reviewed/Progressed HEP activities related to improving balance, coordination, kinesthetic sense, posture, motor skill, proprioception of   [] LE: core, proximal hip and LE for self care, mobility, lifting, and ambulation/stair navigation    [] UE / Cervical: cervical, postural,  scapular, scapulothoracic and UE control with self care, reaching, carrying, lifting, house/yardwork, driving, computer work    Manual Treatments:  PROM / STM / Oscillations-Mobs:  G-I, II, III, IV (PA's, Inf., Post.)  [x] (09771) Provided manual therapy to mobilize LE, proximal hip and/or LS spine soft tissue/joints for the purpose of modulating pain, promoting relaxation,  increasing ROM, reducing/eliminating soft tissue swelling/inflammation/restriction, improving soft tissue extensibility and allowing for proper ROM for normal function with   [x] LE / lumbar: self care, mobility, lifting and ambulation. [] UE / Cervical: self care, reaching, carrying, lifting, house/yardwork, driving, computer work.      Modalities:  [] (43853) Vasopneumatic compression: Utilized vasopneumatic compression to decrease edema / swelling for the purpose of improving mobility and quad tone / recruitment which will allow for increased overall function including but not limited to self-care, transfers, ambulation, and ascending / descending stairs. Charges:  Timed Code Treatment Minutes: 101   Total Treatment Minutes: 101     [] EVAL - LOW (97262)   [] EVAL - MOD (54799)  [] EVAL - HIGH (29307)  [] RE-EVAL (41341)  [] JE(12943) x   1    [] Ionto  [] NMR (61380) x       [] Vaso  [x] Manual (04571) x   4   [] Ultrasound  [x] TA x   3     [] Mech Traction (65356)  [] Aquatic Therapy x     [] ES (un) (03793):   [] Home Management Training x  [] ES(attended) (04830)   [] Dry Needling 1-2 muscles (46463):  [] Dry Needling 3+ muscles (265071  [] Group:      [] Other: gaitx1    GOALS:  Patient stated goal: \"Pt wants to be able to stand with his walker and walk household distances\"  [x] Progressing: [] Met: [] Not Met: [] Adjusted     Therapist goals for Patient:   Short Term Goals: To be achieved in: 2 weeks  1. Independent in HEP and progression per patient tolerance, in order to prevent return of swelling   [] Progressing: [x] Met: [] Not Met: [] Adjusted  2. Patient will have a decrease in swelling/pain to facilitate improvement in movement, function, and ADLs as indicated by improvement with LLIS. [] Progressing: [x] Met: [] Not Met: [] Adjusted     Long Term Goals: To be achieved in: 6 weeks  1. FOTO score of at least 46 to assist with reaching prior level of function. [] Progressing: [x] Met: [] Not Met: [] Adjusted  2. Patient will demonstrate increased AROM/strength of BLE to 4/5 so that pt can resume walking and standing without increase in symptoms. [x] Progressing: [] Met: [] Not Met: [] Adjusted  3. Decrease swelling of BLEs by 5cm so that pt can return to functional activities including standing, walking, squatting, and lifting without increased symptoms or restriction. [x] Progressing: [] Met: [] Not Met: [] Adjusted  4.  Patient will be able to stand for >/= 5 minutes in order to improve standing tolerance for performing ADLs. [x] Progressing: [] Met: [] Not Met: [] Adjusted  5. Patient will be able to ambulate short community distances (300-500ft) with the LRAD to improve functional mobility in his home and community. [x] Progressing: [] Met: [] Not Met: [] Adjusted    Overall Progression Towards Functional goals/ Treatment Progress Update:  [x] Patient is progressing as expected towards functional goals listed. [] Progression is slowed due to complexities/Impairments listed. [] Progression has been slowed due to co-morbidities. [] Plan just implemented, too soon to assess goals progression <30days   [] Goals require adjustment due to lack of progress  [] Patient is not progressing as expected and requires additional follow up with physician  [] Other    Persisting Functional Limitations/Impairments:  []Sleeping []Sitting               [x]Standing [x]Transfers        [x]Walking [x]Kneeling               [x]Stairs [x]Squatting / bending   [x]ADLs []Reaching  [x]Lifting  [x]Housework  []Driving []Job related tasks  []Sports/Recreation []Other:        ASSESSMENT:  Pt with significantly decreased edema in BLEs this date. Pt with decreased constriction marks with use of foam horseshoe at ankles. Added 2nd 12 in bandage to each leg this date. Pt reports feeling good with compression on. Pt able to amb to nustep and out of clinic this date with VC for knee and hip ext and heel strike. Completed 7 min on nustep without rest break demonstrating improved endurance. [employer] to go to pt's home to trial lymphedema pump. Continue with MLD, compression, and exercise to further decrease edema and improve functional mobility.        Treatment/Activity Tolerance:  [x] Patient able to complete tx [] Patient limited by fatigue  [] Patient limited by pain  [] Patient limited by other medical complications  [] Other:     Prognosis: [x] Good [] Fair  [] Poor    Patient Requires Follow-up: [x] Yes  []

## 2022-08-16 ENCOUNTER — HOSPITAL ENCOUNTER (OUTPATIENT)
Dept: PHYSICAL THERAPY | Age: 77
Setting detail: THERAPIES SERIES
Discharge: HOME | End: 2022-08-16
Payer: COMMERCIAL

## 2022-08-16 PROCEDURE — 97116 GAIT TRAINING THERAPY: CPT

## 2022-08-16 PROCEDURE — 97530 THERAPEUTIC ACTIVITIES: CPT

## 2022-08-16 PROCEDURE — 97140 MANUAL THERAPY 1/> REGIONS: CPT

## 2022-08-16 NOTE — FLOWSHEET NOTE
[address] [facility] Physical Therapy  Phone: [phone]   Fax: [phone]    Physical Therapy Daily LYMPHEDEMA Treatment Note    Date:  2022    Patient Name:  Inge Echeverria    :  1945  MRN: [id number]  Restrictions/Precautions:    Medical/Treatment Diagnosis Information:  Diagnosis: BILATERAL LOWER EXTREMITY LYMPHEDEMA  Treatment Diagnosis: B LE chronic swelling and wounds R > L and decreased B LE strength, decreased functional mobilityPT diagnosis:  Insurance/Certification information:  PT Insurance Information: [address] [place] [address] - no auth/no copay/BMN  Physician Information:  Deedee Green NP    Plan of care signed (Y/N): []  Yes [x]  No     Date of Patient follow up with Physician:       Progress Report: []  Yes  [x]  No     Date Range for reporting period:  Beginnin22  PT POC 22 - faxed for signature  Ending    Progress report due (10 Rx/or 30 days whichever is less): visit #10 or      Recertification due (POC duration/ or 90 days whichever is less): 22    Visit # Insurance Allowable Auth required? Date Range    + 0 BMN []  Yes  [x]  No pcy        Latex Allergy:  [x]NO      []YES  Preferred Language for Healthcare:   [x]English       []other:      Functional Scale:                                                                                                      Date assessed:  FOTO physical FS primary measure score = 39; risk adjusted = 47                  22  FOTO physical FS primary measure score = 46                 22    Pain level:  0/10  L LEs, 7/10 on R LE since yesterday around lunch time    SUBJECTIVE:  increased pain since yesterday at lunch time - insidious onset. Still walking. Feels like R LE is more swollen. RNs are changing dressings on wounds/re-wrapping LE every other day. OBJECTIVE:   22: observe:    L LE: increased swelling at dorsum of foot and toes.   Decreased swelling ankle to below knee. Foam rectangle at ankle reduces constriction  at ankle  R LE: increased swelling, increased redness and increased warmth compared to L LE. Concern re possible cellulitis. PT called RN at pt's living facility - see below  Deferred CKC ex due to possible cellulitis R LE      8/9 overall reduction in swelling and wounds healing. Distal swelling present at R foot and toes - needs improved compression gradient with bandages  NEW Blister opened R ant/lat shin: 1.3cm x 0.9cm. new red area R dorsal foot: 2\" x 1.5\" (Not open). PT cleaned opened blister area with alcohol wipe and covered with tegaderm - communicated to pt's RN on communication form  8/2  GIRTH MEASUREMENT  (Tape on skin along anterior tibialis)     Lower Extremity Right (cm) Left  (cm) Right (cm) Left (cm)   Date 6/30/22 6/30/22 8/2 8/2   Measurements taken as follows: 0 cm at inf aspect of lateral malleolus and marked on    [] Ant aspect of LE    [x] Lateral aspect of LE    [] sheet                   cm  Widest at hip         cm at waist (at umbilicus), measured while reclined on hospital bed 132.0 cm                 Metatarsal heads 25.7cm 25.7cm 26.1 25.5   0 Cm above inf aspect of  LATERAL MALLEOLUS 37.8 35.5 cm 34.9 32.5    10 Cm above inf aspect of  LATERAL MALLEOLUS    38.7 33.4cm 39.1 30.6   20 Cm above  inf aspec of LATERAL MALLEOLUS 45.3 38.5 44.4 39.8   30 Cm above  inf aspect of  LATERAL MALLEOLUS 46.4 41.4 45.5 46.8             Total Girth 193.9 174.5 190.0 175.2     RLE decreased total girth by 3.9 cm   LLE increased total girth by 0.7 cm     7/21   Pt to clinic wearing 2 tensoshape each LE from mid foot to below knee. Signs of constriction present B LE ant ankle and R LE below knee - inappropriate compression gradient.   Dorsal foot swelling increased B - due to constriction at ankles from rolled and doubled tensoshapes  causing constriction  multiple new wounds present B LEs:  L medial calf: 1.5 x 0.8cm, red area L > bed - SBA X 3  VC for knee ext    Nu step   S=11, arms =10, workload =3   8/16 deferred due to possible cellulitis    Wearing multilayer bandaging   Rest break           Home Management  (providing pt education on safety procedures/instructions)       Pt educated on compression as follows:   how to appropriately apply and wear compression  how to maintain appropriate gradient compression  do not sleep with compression garment/tensoshape  signs and symptoms of constriction   when to remove compression       Pt educated on lymphedema prevention and management    7/12 completed during MLD    Pt educated on MLD                            Neuromuscular Re-ed (55134)       balance                     Manual Intervention (93204)      See MLD flowchart below   X 40 min   To B lateral trunks, L LE only due to concern for possible cellulitis R LE 7/21 deferred due to concern re possible cellulitis                                        Manual Lymph Drainage (MLD):  MLD to B LE , clearing along alternate pathway of  Ipsilateral trunk  to  Ipsilateral axillary  lymph nodes    Clear Nodes 10x each   Neck x   [address]    Axilla x   Abdomen x   Groin x   Popliteal x2 x   Clear Alternate Pathway 10x each   Re-clear alternate pathway   x5 each position x   Location Ipsilateral trunk       Fluid Mobilization 10x each   Re-clear alternate pathway   x5 each position x   Shoulder bracing    Location B LE and ipsilateral trunk       Protein Resorption 10x each   Location LE below knee       Clear Foot/Ankle or Hand 10x each   Achilles x   Bilateral malleolus x   Fan the cards x   Clear dorsum x   Clear through web space x   Clear toes/fingers x   Fluid mobilization x   Re-clear all positions  X5 each x       Modalities:     OTHER:   8/16/22 1121am PT called Tati Tai director of nursing next at [phone] re pt- discussed concern re possible new onset cellulitis R  LE.  She will call pt;'s MD re possible start of antibiotics for cellulitis. 7/21 pt signed release of info form for PT to communicate with medical staff at [facility]     7/21 PT called [place] NP  re pt- discussed concern re possible new onset cellulitis R and or L LE, new onset wounds, inappropriate compression gradient with 2 tensoshapes that rolled. Neha Pierson  states she will call in antibiotic. At pt request, PT called RN Katherine Soares [phone]; unable to LM due to VM full. [facility] at [phone] - no answer, no VM. PT wrote note to RNs a pt's independent living facility re need for approp compression gradient and pt will order lymphedema bandages - low stretch 2x8cm, 4x12 cm, 2x15 cm artiflex. 6/30 pt signed release of info for PT to send PT notes and insurance info to [facility] possibility of getting lymphedema pump for B LE    Pt education:   8/9 review HEP - see below d/w pt benefit of rewrapping daily, how to void constriction marks and gradually increasing walking duration/distance- wrote this on his note to RN  7/21 extensive pt ed re appropriate compression and compression gradient, need to avoid constriction    6/30   pt ed and provided with form on what compression wrappings to purchase. Ed on ankle pumps and toe flx/ext to stimulate lymphatic flow. Pt educated on pathology and anatomy of etiology of lymphedema, condition precautions, indications for long term prognosis. Pt was educated on diagnosis; prognosis; PT POC including MLD, compression (role of multilayer bandaging/ compression garments), lymphedema management/prevention of flare ups, role of exercise, HEP, lymphedema pump; expectations for rehab. All pt questions were answered. HEP instruction:  8/9 increase duration of walks by 15-60 sec; goal - increase walk duration so he can do 1-2 laps at facility.  Re-wrap daily as able- trial of grey foam at ankles    7/26   Access Code: [email or web address]  URL: [email or web address]  Date: 07/26/2022  Prepared by: Max Meza    Exercises  Long Sitting Quad Set - 3 x daily - 7 x weekly - 1 sets - 10 reps - 5 sec hold  Supine Knee Extension Stretch on Towel Roll - 3 x daily - 7 x weekly - 5-6 min hold    7/21 pt to order low stretch compression bandages: 2x8cm, 4x12 cm and artiflex 2x15 cm and bring to next appt. Encouraged pt to try to plan for a staff member to come to appt to be instructed in lymphedema wrapping and approp comrpession gradient    Eval: Pt instructed in lymphedema management/prevention concepts and swipe method of MLD, ankle pumps, toe DF/PF    Therapeutic Exercise and NMR EXR  [] (21152) Provided verbal/tactile cueing for activities related to strengthening, flexibility, endurance, ROM for improvements in  [] LE / Lumbar: LE, proximal hip, and core control with self care, mobility, lifting, ambulation. [] UE / Cervical: cervical, postural, scapular, scapulothoracic and UE control with self care, reaching, carrying, lifting, house/yardwork, driving, computer work.  [] (88295) Provided verbal/tactile cueing for activities related to improving balance, coordination, kinesthetic sense, posture, motor skill, proprioception to assist with   [] LE / lumbar: LE, proximal hip, and core control in self care, mobility, lifting, ambulation and eccentric single leg control. [] UE / cervical: cervical, scapular, scapulothoracic and UE control with self care, reaching, carrying, lifting, house/yardwork, driving, computer work.   [] (26587) Therapist is in constant attendance of 2 or more patients providing skilled therapy interventions, but not providing any significant amount of measurable one-on-one time to either patient, for improvements in  [] LE / lumbar: LE, proximal hip, and core control in self care, mobility, lifting, ambulation and eccentric single leg control.    [] UE / cervical: cervical, scapular, scapulothoracic and UE control with self care, reaching, carrying, lifting, house/yardwork, driving, computer work. NMR and Therapeutic Activities:    [x] (93945 or 62992) Provided verbal/tactile cueing for activities related to improving balance, coordination, kinesthetic sense, posture, motor skill, proprioception and motor activation to allow for proper function of   [x] LE: / Lumbar core, proximal hip and LE with self care and ADLs  [] UE / Cervical: cervical, postural, scapular, scapulothoracic and UE control with self care, carrying, lifting, driving, computer work.   [] (00116) Gait Re-education- Provided training and instruction to the patient for proper LE, core and proximal hip recruitment and positioning and eccentric body weight control with ambulation re-education including up and down stairs     Home Management Training / Self Care:  [] (37192) Provided self-care/home management training related to activities of daily living and compensatory training, and/or use of adaptive equipment for improvement with: ADLs and compensatory training, meal preparation, safety procedures and instruction in use of adaptive equipment, including bathing, grooming, dressing, personal hygiene, basic household cleaning and chores.      Home Exercise Program:    [] (25893) Reviewed/Progressed HEP activities related to strengthening, flexibility, endurance, ROM of   [] LE / Lumbar: core, proximal hip and LE for functional self-care, mobility, lifting and ambulation/stair navigation   [] UE / Cervical: cervical, postural, scapular, scapulothoracic and UE control with self care, reaching, carrying, lifting, house/yardwork, driving, computer work  [] (90162)Reviewed/Progressed HEP activities related to improving balance, coordination, kinesthetic sense, posture, motor skill, proprioception of   [] LE: core, proximal hip and LE for self care, mobility, lifting, and ambulation/stair navigation    [] UE / Cervical: cervical, postural,  scapular, scapulothoracic and UE control with self care, reaching, carrying, lifting, house/yardwork, driving, computer work    Manual Treatments:  PROM / STM / Oscillations-Mobs:  G-I, II, III, IV (PA's, Inf., Post.)  [x] (70383) Provided manual therapy to mobilize LE, proximal hip and/or LS spine soft tissue/joints for the purpose of modulating pain, promoting relaxation,  increasing ROM, reducing/eliminating soft tissue swelling/inflammation/restriction, improving soft tissue extensibility and allowing for proper ROM for normal function with   [x] LE / lumbar: self care, mobility, lifting and ambulation. [] UE / Cervical: self care, reaching, carrying, lifting, house/yardwork, driving, computer work. Modalities:  [] (10625) Vasopneumatic compression: Utilized vasopneumatic compression to decrease edema / swelling for the purpose of improving mobility and quad tone / recruitment which will allow for increased overall function including but not limited to self-care, transfers, ambulation, and ascending / descending stairs. Charges:  Timed Code Treatment Minutes: 76   Total Treatment Minutes: 76     [] EVAL - LOW (80576)   [] EVAL - MOD (24211)  [] EVAL - HIGH (26388)  [] RE-EVAL (44853)  [] GK(20012) x   1    [] Ionto  [] NMR (48920) x       [] Vaso  [x] Manual (81842) x   2   [] Ultrasound  [x] TA x   2     [] Mech Traction (79544)  [] Aquatic Therapy x     [] ES (un) (69978):   [] Home Management Training x  [] ES(attended) (38440)   [] Dry Needling 1-2 muscles (57060):  [] Dry Needling 3+ muscles (387391  [] Group:      [x] Other: gaitx1    GOALS:  Patient stated goal: \"Pt wants to be able to stand with his walker and walk household distances\"  [x] Progressing: [] Met: [] Not Met: [] Adjusted     Therapist goals for Patient:   Short Term Goals: To be achieved in: 2 weeks  1. Independent in HEP and progression per patient tolerance, in order to prevent return of swelling   [] Progressing: [x] Met: [] Not Met: [] Adjusted  2.  Patient will have a decrease in swelling/pain to facilitate improvement in movement, function, and ADLs as indicated by improvement with LLIS. [] Progressing: [x] Met: [] Not Met: [] Adjusted     Long Term Goals: To be achieved in: 6 weeks  1. FOTO score of at least 46 to assist with reaching prior level of function. [] Progressing: [x] Met: [] Not Met: [] Adjusted  2. Patient will demonstrate increased AROM/strength of BLE to 4/5 so that pt can resume walking and standing without increase in symptoms. [x] Progressing: [] Met: [] Not Met: [] Adjusted  3. Decrease swelling of BLEs by 5cm so that pt can return to functional activities including standing, walking, squatting, and lifting without increased symptoms or restriction. [x] Progressing: [] Met: [] Not Met: [] Adjusted  4. Patient will be able to stand for >/= 5 minutes in order to improve standing tolerance for performing ADLs. [x] Progressing: [] Met: [] Not Met: [] Adjusted  5. Patient will be able to ambulate short community distances (300-500ft) with the LRAD to improve functional mobility in his home and community. [x] Progressing: [] Met: [] Not Met: [] Adjusted    Overall Progression Towards Functional goals/ Treatment Progress Update:  [x] Patient is progressing as expected towards functional goals listed. [] Progression is slowed due to complexities/Impairments listed. [] Progression has been slowed due to co-morbidities.   [] Plan just implemented, too soon to assess goals progression <30days   [] Goals require adjustment due to lack of progress  [] Patient is not progressing as expected and requires additional follow up with physician  [] Other    Persisting Functional Limitations/Impairments:  []Sleeping []Sitting               [x]Standing [x]Transfers        [x]Walking [x]Kneeling               [x]Stairs [x]Squatting / bending   [x]ADLs []Reaching  [x]Lifting  [x]Housework  []Driving []Job related tasks  []Sports/Recreation []Other:        ASSESSMENT:  8/16 possible onset of cellulitis R LE. Deferred seated stepper and MLD to R LE for this reason. PT spoke with RN Alva Wheeler) at [facility] point about this - she will f/u with MD.  Added trial of foam rectangle to B dorsum of feet due to consistent swelling at toes and dorsum of feet. 8/12 Pt with significantly decreased edema in BLEs this date. Pt with decreased constriction marks with use of foam horseshoe at ankles. Added 2nd 12 in bandage to each leg this date. Pt reports feeling good with compression on. Pt able to amb to nustep and out of clinic this date with VC for knee and hip ext and heel strike. Completed 7 min on nustep without rest break demonstrating improved endurance. [employer] to go to pt's home to trial lymphedema pump. Continue with MLD, compression, and exercise to further decrease edema and improve functional mobility. Treatment/Activity Tolerance:  [x] Patient able to complete tx [] Patient limited by fatigue  [] Patient limited by pain  [] Patient limited by other medical complications  [] Other:     Prognosis: [x] Good [] Fair  [] Poor    Patient Requires Follow-up: [x] Yes  [] No    Plan for next treatment session:   MLD, compression - bandage B LE below knee , HEP, pt education, lymph pump  B LE, lymph pump for home to help prevent chronic wounds R>L LE, exercise to stimulate lymphatic flow, LE ex, balance, gait, fxnl mobility    PLAN: See eval. PT 2x / week for 6 weeks. + 2x/wk for 6 wks   [x] Continue per plan of care [] Alter current plan (see comments)  [] Plan of care initiated [] Hold pending MD visit [] Discharge    Electronically signed by: Rafi Thompson, PT, DPT      Note: If patient does not return for scheduled/ recommended follow up visits, this note will serve as a discharge from care along with most recent update on progress.

## 2022-08-18 ENCOUNTER — APPOINTMENT (OUTPATIENT)
Dept: PHYSICAL THERAPY | Age: 77
End: 2022-08-18
Payer: COMMERCIAL

## 2022-08-22 ENCOUNTER — HOSPITAL ENCOUNTER (OUTPATIENT)
Dept: PHYSICAL THERAPY | Age: 77
Setting detail: THERAPIES SERIES
Discharge: HOME | End: 2022-08-22
Payer: COMMERCIAL

## 2022-08-22 ENCOUNTER — APPOINTMENT (OUTPATIENT)
Dept: GENERAL RADIOLOGY | Age: 77
End: 2022-08-22
Payer: COMMERCIAL

## 2022-08-22 ENCOUNTER — HOSPITAL ENCOUNTER (EMERGENCY)
Age: 77
Discharge: HOME | End: 2022-08-22
Attending: EMERGENCY MEDICINE
Payer: COMMERCIAL

## 2022-08-22 VITALS
OXYGEN SATURATION: 99 % | HEART RATE: 78 BPM | TEMPERATURE: 97.2 F | WEIGHT: 230 LBS | SYSTOLIC BLOOD PRESSURE: 104 MMHG | RESPIRATION RATE: 15 BRPM | BODY MASS INDEX: 32.08 KG/M2 | DIASTOLIC BLOOD PRESSURE: 75 MMHG

## 2022-08-22 DIAGNOSIS — R58 ECCHYMOSIS: ICD-10-CM

## 2022-08-22 DIAGNOSIS — R79.89 ELEVATED SERUM CREATININE: ICD-10-CM

## 2022-08-22 DIAGNOSIS — S76.912A MUSCLE STRAIN OF LEFT THIGH, INITIAL ENCOUNTER: Primary | ICD-10-CM

## 2022-08-22 LAB
ANION GAP SERPL CALCULATED.3IONS-SCNC: 8 MMOL/L (ref 3–16)
BASOPHILS ABSOLUTE: 0.1 K/UL (ref 0–0.2)
BASOPHILS RELATIVE PERCENT: 1 %
BUN BLDV-MCNC: 39 MG/DL (ref 7–20)
CALCIUM SERPL-MCNC: 9.8 MG/DL (ref 8.3–10.6)
CHLORIDE BLD-SCNC: 101 MMOL/L (ref 99–110)
CO2: 26 MMOL/L (ref 21–32)
CREAT SERPL-MCNC: 1.4 MG/DL (ref 0.8–1.3)
EOSINOPHILS ABSOLUTE: 0.2 K/UL (ref 0–0.6)
EOSINOPHILS RELATIVE PERCENT: 2.2 %
GFR AFRICAN AMERICAN: 59
GFR NON-AFRICAN AMERICAN: 49
GLUCOSE BLD-MCNC: 115 MG/DL (ref 70–99)
HCT VFR BLD CALC: 38.3 % (ref 40.5–52.5)
HEMOGLOBIN: 12.3 G/DL (ref 13.5–17.5)
LYMPHOCYTES ABSOLUTE: 1.7 K/UL (ref 1–5.1)
LYMPHOCYTES RELATIVE PERCENT: 17.9 %
MCH RBC QN AUTO: 30 PG (ref 26–34)
MCHC RBC AUTO-ENTMCNC: 32.2 G/DL (ref 31–36)
MCV RBC AUTO: 93.2 FL (ref 80–100)
MONOCYTES ABSOLUTE: 0.7 K/UL (ref 0–1.3)
MONOCYTES RELATIVE PERCENT: 7.3 %
NEUTROPHILS ABSOLUTE: 6.7 K/UL (ref 1.7–7.7)
NEUTROPHILS RELATIVE PERCENT: 71.6 %
PDW BLD-RTO: 14.3 % (ref 12.4–15.4)
PLATELET # BLD: 294 K/UL (ref 135–450)
PMV BLD AUTO: 7.7 FL (ref 5–10.5)
POTASSIUM SERPL-SCNC: 5.4 MMOL/L (ref 3.5–5.1)
RBC # BLD: 4.11 M/UL (ref 4.2–5.9)
SODIUM BLD-SCNC: 135 MMOL/L (ref 136–145)
WBC # BLD: 9.4 K/UL (ref 4–11)

## 2022-08-22 PROCEDURE — 80048 BASIC METABOLIC PNL TOTAL CA: CPT

## 2022-08-22 PROCEDURE — 99284 EMERGENCY DEPT VISIT MOD MDM: CPT

## 2022-08-22 PROCEDURE — 2580000003 HC RX 258: Performed by: EMERGENCY MEDICINE

## 2022-08-22 PROCEDURE — 36415 COLL VENOUS BLD VENIPUNCTURE: CPT

## 2022-08-22 PROCEDURE — 6370000000 HC RX 637 (ALT 250 FOR IP): Performed by: EMERGENCY MEDICINE

## 2022-08-22 PROCEDURE — 73502 X-RAY EXAM HIP UNI 2-3 VIEWS: CPT

## 2022-08-22 PROCEDURE — 85025 COMPLETE CBC W/AUTO DIFF WBC: CPT

## 2022-08-22 RX ORDER — LISINOPRIL 20 MG/1
20 TABLET ORAL DAILY
COMMUNITY

## 2022-08-22 RX ORDER — GUAIFENESIN 600 MG/1
1200 TABLET, EXTENDED RELEASE ORAL 2 TIMES DAILY
COMMUNITY

## 2022-08-22 RX ORDER — BUDESONIDE AND FORMOTEROL FUMARATE DIHYDRATE 160; 4.5 UG/1; UG/1
2 AEROSOL RESPIRATORY (INHALATION) 2 TIMES DAILY
COMMUNITY

## 2022-08-22 RX ORDER — METOLAZONE 2.5 MG/1
2.5 TABLET ORAL DAILY
COMMUNITY

## 2022-08-22 RX ORDER — FLUTICASONE PROPIONATE 50 MCG
1 SPRAY, SUSPENSION (ML) NASAL DAILY
COMMUNITY

## 2022-08-22 RX ORDER — 0.9 % SODIUM CHLORIDE 0.9 %
1000 INTRAVENOUS SOLUTION INTRAVENOUS ONCE
Status: COMPLETED | OUTPATIENT
Start: 2022-08-22 | End: 2022-08-22

## 2022-08-22 RX ORDER — LIDOCAINE 50 MG/G
1 PATCH TOPICAL DAILY
Qty: 10 PATCH | Refills: 0 | Status: SHIPPED | OUTPATIENT
Start: 2022-08-22 | End: 2022-09-01

## 2022-08-22 RX ORDER — LINEZOLID 600 MG/1
600 TABLET, FILM COATED ORAL 2 TIMES DAILY
COMMUNITY

## 2022-08-22 RX ORDER — CETIRIZINE HYDROCHLORIDE 5 MG/1
5 TABLET ORAL DAILY
COMMUNITY

## 2022-08-22 RX ORDER — FUROSEMIDE 20 MG/1
20 TABLET ORAL DAILY
COMMUNITY

## 2022-08-22 RX ORDER — OXYCODONE HYDROCHLORIDE AND ACETAMINOPHEN 5; 325 MG/1; MG/1
1 TABLET ORAL ONCE
Status: COMPLETED | OUTPATIENT
Start: 2022-08-22 | End: 2022-08-22

## 2022-08-22 RX ORDER — METHOCARBAMOL 750 MG/1
750 TABLET, FILM COATED ORAL EVERY 8 HOURS PRN
Qty: 20 TABLET | Refills: 0 | Status: SHIPPED | OUTPATIENT
Start: 2022-08-22 | End: 2022-09-01

## 2022-08-22 RX ADMIN — SODIUM CHLORIDE 1000 ML: 9 INJECTION, SOLUTION INTRAVENOUS at 12:49

## 2022-08-22 RX ADMIN — OXYCODONE HYDROCHLORIDE AND ACETAMINOPHEN 1 TABLET: 5; 325 TABLET ORAL at 12:44

## 2022-08-22 ASSESSMENT — PAIN SCALES - GENERAL
PAINLEVEL_OUTOF10: 9
PAINLEVEL_OUTOF10: 0

## 2022-08-22 ASSESSMENT — PAIN - FUNCTIONAL ASSESSMENT: PAIN_FUNCTIONAL_ASSESSMENT: 0-10

## 2022-08-22 NOTE — ED NOTES
Report given to CMT transport at this time, all questions answered, D/C instructions given to pt with prescriptions, and Pt transported back to [facility] in stable condition.      Chelsey Duron RN  08/22/22 0376

## 2022-08-22 NOTE — DISCHARGE INSTRUCTIONS
Please follow-up with your primary care physician within the next 2 days for reevaluation of your symptoms and for recheck of your kidney function. May use lidocaine patches and Tylenol as needed for pain. He may also use methocarbamol as prescribed today for pain, stop using this medication immediately if you feel dizzy or unsteady on your feet. I also recommend he follow-up with orthopedics at the above contact information for further evaluation of your hip and thigh muscle pain. Return if you have worsening or changing symptoms, leg weakness, numbness, fevers or severe worsening pain. Return if you have other new or changing symptoms that concern you and you wish to be reevaluated.

## 2022-08-22 NOTE — FLOWSHEET NOTE
Physical Therapy  Cancellation/No-show Note  Patient Name:  Yair Molina  :  1945   Date:  2022  Cancelled visits to date: 2  No-shows to date: 0    Patient status for today's appointment patient:  [x]  Cancelled 22  []  Rescheduled appointment  []  No-show     Reason given by patient:  [x]  Patient ill [birth year]  []  Conflicting appointment  []  No transportation    []  Conflict with work  []  No reason given  []  Other:     Comments:      Phone call information:   []  Phone call made today to patient at _ time at number provided:      []  Patient answered, conversation as follows:    []  Patient did not answer, message left as follows:  []  Phone call not made today  [x]  Phone call not needed - pt contacted us to cancel and provided reason for cancellation.      Electronically signed by:  Shayla Renteria PT

## 2022-08-22 NOTE — ED NOTES
Report attempted to be called to [facility], able to reach nurses's station, but had to leave voicemail to nursing staff for them to call back to hospital for report.      Chelsey Duron RN  08/22/22 4388

## 2022-08-22 NOTE — ED NOTES
Arrived by [facility] from [facility] rt left thigh pain, swelling & redness to inner thigh; noted possible opening to skin at site. Currently receiving treatment for cellulitus of right leg; noted swelling & bandage to right calf. Hx of CHF; takes lasix. Monitors in place.        Mel Hines RN  08/22/22 0451
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
## 2022-08-26 ENCOUNTER — HOSPITAL ENCOUNTER (OUTPATIENT)
Dept: PHYSICAL THERAPY | Age: 77
Setting detail: THERAPIES SERIES
Discharge: HOME | End: 2022-08-26
Payer: COMMERCIAL

## 2022-08-26 PROCEDURE — 97530 THERAPEUTIC ACTIVITIES: CPT

## 2022-08-26 PROCEDURE — 97140 MANUAL THERAPY 1/> REGIONS: CPT

## 2022-08-26 NOTE — FLOWSHEET NOTE
[address] [facility]  Phone: [phone]   Fax: [phone]    Physical Therapy Daily LYMPHEDEMA Treatment Note    Date:  2022    Patient Name:  Karyna Callejas    :  1945  MRN: [id number]  Restrictions/Precautions:    Medical/Treatment Diagnosis Information:  Diagnosis: BILATERAL LOWER EXTREMITY LYMPHEDEMA  Treatment Diagnosis: B LE chronic swelling and wounds R > L and decreased B LE strength, decreased functional mobilityPT diagnosis:  Insurance/Certification information:  PT [facility] Information:  [facility]/no [facility]/BMN  Physician Information:  Wan Fairbanks NP    Plan of care signed (Y/N): []  Yes [x]  No     Date of Patient follow up with Physician:       Progress Report: []  Yes  [x]  No     Date Range for reporting period:  Beginnin22  PT POC 22 - faxed for signature  Ending    Progress report due (10 Rx/or 30 days whichever is less): visit #10 or      Recertification due (POC duration/ or 90 days whichever is less): 22    Visit # Insurance Allowable Auth required? Date Range    +  BMN []  Yes  [x]  No pcy        Latex Allergy:  [x]NO      []YES  Preferred Language for Healthcare:   [x]English       []other:      Functional Scale:                                                                                                      Date assessed:  FOTO physical FS primary measure score = 39; risk adjusted = 47                  22  FOTO physical FS primary measure score = 46                 22    Pain level:  8/10 LLE     SUBJECTIVE:  Pt reports he went to ER Monday. He was sneezing in bed last Wednesday and twisted/pulled something in his L medial thigh. States it was black and blue and painful with WB. Reports negative x-ray and negative for infection. Pt reports he continues to have pain with WB. Reports wounds are almost closed.        OBJECTIVE:   22: observe:    L LE: increased swelling at dorsum of foot and toes. Decreased swelling ankle to below knee. Foam rectangle at ankle reduces constriction  at ankle  R LE: increased swelling, increased redness and increased warmth compared to L LE. Concern re possible cellulitis. PT called RN at pt's living facility - see below  Deferred CKC ex due to possible cellulitis R LE      8/9 overall reduction in swelling and wounds healing. Distal swelling present at R foot and toes - needs improved compression gradient with bandages  NEW Blister opened R ant/lat shin: 1.3cm x 0.9cm. new red area R dorsal foot: 2\" x 1.5\" (Not open). PT cleaned opened blister area with alcohol wipe and covered with tegaderm - communicated to pt's RN on communication form  8/2  GIRTH MEASUREMENT  (Tape on skin along anterior tibialis)     Lower Extremity Right (cm) Left  (cm) Right (cm) Left (cm)   Date 6/30/22 6/30/22 8/2 8/2   Measurements taken as follows: 0 cm at inf aspect of lateral malleolus and marked on    [] Ant aspect of LE    [x] Lateral aspect of LE    [] sheet                   cm  Widest at hip         cm at waist (at umbilicus), measured while reclined on hospital bed 132.0 cm                 Metatarsal heads 25.7cm 25.7cm 26.1 25.5   0 Cm above inf aspect of  LATERAL MALLEOLUS 37.8 35.5 cm 34.9 32.5    10 Cm above inf aspect of  LATERAL MALLEOLUS    38.7 33.4cm 39.1 30.6   20 Cm above  inf aspec of LATERAL MALLEOLUS 45.3 38.5 44.4 39.8   30 Cm above  inf aspect of  LATERAL MALLEOLUS 46.4 41.4 45.5 46.8             Total Girth 193.9 174.5 190.0 175.2     RLE decreased total girth by 3.9 cm   LLE increased total girth by 0.7 cm     7/21   Pt to clinic wearing 2 tensoshape each LE from mid foot to below knee. Signs of constriction present B LE ant ankle and R LE below knee - inappropriate compression gradient.   Dorsal foot swelling increased B - due to constriction at ankles from rolled and doubled tensoshapes  causing constriction  multiple new wounds present B LEs:  L medial calf: 1.5 x 0.8cm, red area L ant ankle: 2.7cnx2.0 cm; R ant shin: 1.7cmx1.7cm, R ant ankle: not yet open but very red: 3.5cmx2.5 with black area in center of red area: 1.0cm x 0.9cm  Redness present B dorsal feet and R LE below knee. Increased warmth R LE compared to L. Concern for possible cellulitis. RESTRICTIONS/PRECAUTIONS:     Exercises/Interventions:     Therapeutic Exercises (15141) Resistance / level Sets/sec Reps Notes   Nu step See below      Pt educated on exercises to stimulate lymphatic flow        CKC LE ex at // bars or counter       Ankle pumps  Toe DF/PF  SAQ  Gastroc stretch with active DF                    Therapeutic Activities (84961)  (Dynamic activities such as compression, designed to improve functional performance)       Transfers:  sit to/from stand from  and hospital bed   VC for knee, hip, and trunk ext    Applied dressing to wound: PT cleaned area with alcohol wipe and covered with bandage      Compression: trial tensoshape size   applied to B LE - XL   7/15 Educated to follow up with wound care nurse when he returns home due to weeping and need for dressing change. Pt states he is on oral antibiotic      Multilayer compression bandaging to BLE  Stockinette    Horseshoe at B ankles  Foam square at dorsum of foot    - held this date due to wounds    8 cm low stretch compression bandage  12 cm low stretch compression bandage  12 cm low stretch compression bandage    7/29 unwrapped lower extremities. Discussed increased compression at foot and wrapping distal to proximal. Wrote notes for pt's nurse. 8/5, 8/2 unwrapped. Educated to increase compression proximally   8/26 unwrapped LE's - educated to increase compression at feet and ankles and to wrap to just under knee. Notes written for pt's nurse     PN completed 8/2 - objective measures taken. Discussed progress made with therapy.         X 30 min      7/26 educated pt and Arelis (wound care nurse) on bandaging    Ambulation  140 ft with RW and CGA      7/21 pt SOB after   HEP   - LLD knee ext stretch   - quad set   - ankle pump  - LAQ      Transfer w/c <> bed - SBA  Transfer sit <> supine ModA to lift LE's onto bed X 1  X 1   VC for knee ext    Nu step   S=11, arms =10, workload =3   8/16 deferred due to possible cellulitis    Wearing multilayer bandaging   Rest break           Home Management  (providing pt education on safety procedures/instructions)       Pt educated on compression as follows:   how to appropriately apply and wear compression  how to maintain appropriate gradient compression  do not sleep with compression garment/tensoshape  signs and symptoms of constriction   when to remove compression       Pt educated on lymphedema prevention and management    7/12 completed during MLD    Pt educated on MLD                            Neuromuscular Re-ed (66188)       balance                     Manual Intervention (84047)      See MLD flowchart below   X 40 min   7/21 deferred due to concern re possible cellulitis                                        Manual Lymph Drainage (MLD):  MLD to B LE , clearing along alternate pathway of  Ipsilateral trunk  to  Ipsilateral axillary  lymph nodes    Clear Nodes 10x each   Neck x   [address]    Axilla x   Abdomen x   Groin x   Popliteal x2 x   Clear Alternate Pathway 10x each   Re-clear alternate pathway   x5 each position x   Location Ipsilateral trunk       Fluid Mobilization 10x each   Re-clear alternate pathway   x5 each position x   Shoulder bracing    Location B LE and ipsilateral trunk       Protein Resorption 10x each   Location LE below knee       Clear Foot/Ankle or Hand 10x each   Achilles x   Bilateral malleolus x   Fan the cards x   Clear dorsum x   Clear through web space x   Clear toes/fingers x   Fluid mobilization x   Re-clear all positions  X5 each x       Modalities:     OTHER:   8/16/22 1121am PT called Called [address]  director of nursing next at [phone] re pt- discussed concern re possible new onset cellulitis R  LE. She will call pt;'s MD re possible start of antibiotics for cellulitis. 7/21 pt signed release of info form for PT to communicate with medical staff at [facility])     7/21 PT called Rosa Maria Sahu NP  re pt- discussed concern re possible new onset cellulitis R and or L LE, new onset wounds, inappropriate compression gradient with 2 tensoshapes that rolled. Ly Viviane  states she will call in antibiotic. At pt request, PT called RN Александр Armas [phone]; unable to LM due to VM full. [facility] next at [phone] - no answer, no VM. PT wrote note to RNs a pt's independent living facility re need for approp compression gradient and pt will order lymphedema bandages - low stretch 2x8cm, 4x12 cm, 2x15 cm artiflex. 6/30 pt signed release of info for PT to send PT notes and insurance info to [facility] re possibility of getting lymphedema pump for B LE    Pt education:   8/9 review HEP - see below d/w pt benefit of rewrapping daily, how to void constriction marks and gradually increasing walking duration/distance- wrote this on his note to RN  7/21 extensive pt ed re appropriate compression and compression gradient, need to avoid constriction    6/30   pt ed and provided with form on what compression wrappings to purchase. Ed on ankle pumps and toe flx/ext to stimulate lymphatic flow. Pt educated on pathology and anatomy of etiology of lymphedema, condition precautions, indications for long term prognosis. Pt was educated on diagnosis; prognosis; PT POC including MLD, compression (role of multilayer bandaging/ compression garments), lymphedema management/prevention of flare ups, role of exercise, HEP, lymphedema pump; expectations for rehab. All pt questions were answered.       HEP instruction:  8/9 increase duration of walks by 15-60 sec; goal - increase walk duration so he can do 1-2 laps at facility. Re-wrap daily as able- trial of grey foam at ankles    7/26   Access Code: [email or web address]  URL: [email or web address]  Date: 07/26/2022  Prepared by: Hansel Gilmore    Exercises  Long Sitting Quad Set - 3 x daily - 7 x weekly - 1 sets - 10 reps - 5 sec hold  Supine Knee Extension Stretch on Towel Roll - 3 x daily - 7 x weekly - 5-6 min hold    7/21 pt to order low stretch compression bandages: 2x8cm, 4x12 cm and artiflex 2x15 cm and bring to next appt. Encouraged pt to try to plan for a staff member to come to appt to be instructed in lymphedema wrapping and approp comrpession gradient    Eval: Pt instructed in lymphedema management/prevention concepts and swipe method of MLD, ankle pumps, toe DF/PF    Therapeutic Exercise and NMR EXR  [] (89551) Provided verbal/tactile cueing for activities related to strengthening, flexibility, endurance, ROM for improvements in  [] LE / Lumbar: LE, proximal hip, and core control with self care, mobility, lifting, ambulation. [] UE / Cervical: cervical, postural, scapular, scapulothoracic and UE control with self care, reaching, carrying, lifting, house/yardwork, driving, computer work.  [] (09225) Provided verbal/tactile cueing for activities related to improving balance, coordination, kinesthetic sense, posture, motor skill, proprioception to assist with   [] LE / lumbar: LE, proximal hip, and core control in self care, mobility, lifting, ambulation and eccentric single leg control.    [] UE / cervical: cervical, scapular, scapulothoracic and UE control with self care, reaching, carrying, lifting, house/yardwork, driving, computer work.   [] (56010) Therapist is in constant attendance of 2 or more patients providing skilled therapy interventions, but not providing any significant amount of measurable one-on-one time to either patient, for improvements in  [] LE / lumbar: LE, proximal hip, and core control in self care, mobility, lifting, ambulation and eccentric single leg control. [] UE / cervical: cervical, scapular, scapulothoracic and UE control with self care, reaching, carrying, lifting, house/yardwork, driving, computer work. NMR and Therapeutic Activities:    [x] (75096 or 16391) Provided verbal/tactile cueing for activities related to improving balance, coordination, kinesthetic sense, posture, motor skill, proprioception and motor activation to allow for proper function of   [x] LE: / Lumbar core, proximal hip and LE with self care and ADLs  [] UE / Cervical: cervical, postural, scapular, scapulothoracic and UE control with self care, carrying, lifting, driving, computer work.   [] (48916) Gait Re-education- Provided training and instruction to the patient for proper LE, core and proximal hip recruitment and positioning and eccentric body weight control with ambulation re-education including up and down stairs     Home Management Training / Self Care:  [] (22733) Provided self-care/home management training related to activities of daily living and compensatory training, and/or use of adaptive equipment for improvement with: ADLs and compensatory training, meal preparation, safety procedures and instruction in use of adaptive equipment, including bathing, grooming, dressing, personal hygiene, basic household cleaning and chores.      Home Exercise Program:    [] (81193) Reviewed/Progressed HEP activities related to strengthening, flexibility, endurance, ROM of   [] LE / Lumbar: core, proximal hip and LE for functional self-care, mobility, lifting and ambulation/stair navigation   [] UE / Cervical: cervical, postural, scapular, scapulothoracic and UE control with self care, reaching, carrying, lifting, house/yardwork, driving, computer work  [] (01484)Reviewed/Progressed HEP activities related to improving balance, coordination, kinesthetic sense, posture, motor skill, proprioception of   [] LE: core, proximal hip and LE for self care, mobility, lifting, and ambulation/stair navigation    [] UE / Cervical: cervical, postural,  scapular, scapulothoracic and UE control with self care, reaching, carrying, lifting, house/yardwork, driving, computer work    Manual Treatments:  PROM / STM / Oscillations-Mobs:  G-I, II, III, IV (PA's, Inf., Post.)  [x] (02112) Provided manual therapy to mobilize LE, proximal hip and/or LS spine soft tissue/joints for the purpose of modulating pain, promoting relaxation,  increasing ROM, reducing/eliminating soft tissue swelling/inflammation/restriction, improving soft tissue extensibility and allowing for proper ROM for normal function with   [x] LE / lumbar: self care, mobility, lifting and ambulation. [] UE / Cervical: self care, reaching, carrying, lifting, house/yardwork, driving, computer work. Modalities:  [] (46476) Vasopneumatic compression: Utilized vasopneumatic compression to decrease edema / swelling for the purpose of improving mobility and quad tone / recruitment which will allow for increased overall function including but not limited to self-care, transfers, ambulation, and ascending / descending stairs. Charges:  Timed Code Treatment Minutes: 85   Total Treatment Minutes: 85     [] EVAL - LOW (89764)   [] EVAL - MOD (45398)  [] EVAL - HIGH (20318)  [] RE-EVAL (44044)  [] WJ(06571) x   1    [] Ionto  [] NMR (34450) x       [] Vaso  [x] Manual (81272) x   3   [] Ultrasound  [x] TA x   3     [] Mech Traction (68214)  [] Aquatic Therapy x     [] ES (un) (29508):   [] Home Management Training x  [] ES(attended) (63731)   [] Dry Needling 1-2 muscles (44835):  [] Dry Needling 3+ muscles (929323  [] Group:      [] Other: gaitx1    GOALS:  Patient stated goal: \"Pt wants to be able to stand with his walker and walk household distances\"  [x] Progressing: [] Met: [] Not Met: [] Adjusted     Therapist goals for Patient:   Short Term Goals:  To be achieved in: 2 weeks  1. Independent in HEP and progression per patient tolerance, in order to prevent return of swelling   [] Progressing: [x] Met: [] Not Met: [] Adjusted  2. Patient will have a decrease in swelling/pain to facilitate improvement in movement, function, and ADLs as indicated by improvement with LLIS. [] Progressing: [x] Met: [] Not Met: [] Adjusted     Long Term Goals: To be achieved in: 6 weeks  1. FOTO score of at least 46 to assist with reaching prior level of function. [] Progressing: [x] Met: [] Not Met: [] Adjusted  2. Patient will demonstrate increased AROM/strength of BLE to 4/5 so that pt can resume walking and standing without increase in symptoms. [x] Progressing: [] Met: [] Not Met: [] Adjusted  3. Decrease swelling of BLEs by 5cm so that pt can return to functional activities including standing, walking, squatting, and lifting without increased symptoms or restriction. [x] Progressing: [] Met: [] Not Met: [] Adjusted  4. Patient will be able to stand for >/= 5 minutes in order to improve standing tolerance for performing ADLs. [x] Progressing: [] Met: [] Not Met: [] Adjusted  5. Patient will be able to ambulate short community distances (300-500ft) with the LRAD to improve functional mobility in his home and community. [x] Progressing: [] Met: [] Not Met: [] Adjusted    Overall Progression Towards Functional goals/ Treatment Progress Update:  [x] Patient is progressing as expected towards functional goals listed. [] Progression is slowed due to complexities/Impairments listed. [] Progression has been slowed due to co-morbidities.   [] Plan just implemented, too soon to assess goals progression <30days   [] Goals require adjustment due to lack of progress  [] Patient is not progressing as expected and requires additional follow up with physician  [] Other    Persisting Functional Limitations/Impairments:  []Sleeping []Sitting               [x]Standing [x]Transfers        [x]Walking [x]Kneeling               [x]Stairs [x]Squatting / bending   [x]ADLs []Reaching  [x]Lifting  [x]Housework  []Driving []Job related tasks  []Sports/Recreation []Other:        ASSESSMENT:    8/26 Pt continues with edema in dorsum of feet this date. Presents with significant decreased edema in calves. Notes written for pt's nurse to increase compression at feet and wrap up to just distal to knees. Pt required ModA this date to transfer sit <> supine due to L hip pain. Continue with MLD, compression, and exercise to decrease edema and improve functional mobility. 8/16 possible onset of cellulitis R LE. Deferred seated stepper and MLD to R LE for this reason. PT spoke with RN Kim Hernández) at [facility] point about this - she will f/u with MD.  Added trial of foam rectangle to B dorsum of feet due to consistent swelling at toes and dorsum of feet. 8/12 Pt with significantly decreased edema in BLEs this date. Pt with decreased constriction marks with use of foam horseshoe at ankles. Added 2nd 12 in bandage to each leg this date. Pt reports feeling good with compression on. Pt able to amb to nustep and out of clinic this date with VC for knee and hip ext and heel strike. Completed 7 min on nustep without rest break demonstrating improved endurance. [employer] to go to pt's home to trial lymphedema pump. Continue with MLD, compression, and exercise to further decrease edema and improve functional mobility.        Treatment/Activity Tolerance:  [x] Patient able to complete tx [] Patient limited by fatigue  [] Patient limited by pain  [] Patient limited by other medical complications  [] Other:     Prognosis: [x] Good [] Fair  [] Poor    Patient Requires Follow-up: [x] Yes  [] No    Plan for next treatment session:   MLD, compression - bandage B LE below knee , HEP, pt education, lymph pump  B LE, lymph pump for home to help prevent chronic wounds R>L LE, exercise to stimulate lymphatic flow, LE ex, balance, gait, fxnl mobility    PLAN: See eval. PT 2x / week for 6 weeks. + 2x/wk for 6 wks   [x] Continue per plan of care [] Alter current plan (see comments)  [] Plan of care initiated [] Hold pending MD visit [] Discharge    Electronically signed by: Kyler Pepper, PT, DPT      Note: If patient does not return for scheduled/ recommended follow up visits, this note will serve as a discharge from care along with most recent update on progress.

## 2022-08-30 ENCOUNTER — HOSPITAL ENCOUNTER (OUTPATIENT)
Dept: PHYSICAL THERAPY | Age: 77
Setting detail: THERAPIES SERIES
Discharge: HOME | End: 2022-08-30
Payer: COMMERCIAL

## 2022-08-30 PROCEDURE — 97530 THERAPEUTIC ACTIVITIES: CPT

## 2022-08-30 PROCEDURE — 97140 MANUAL THERAPY 1/> REGIONS: CPT

## 2022-08-30 NOTE — FLOWSHEET NOTE
[address] [facility] Therapy  Phone: [phone]   Fax: [phone]    Physical Therapy Daily LYMPHEDEMA Treatment Note    Date:  2022    Patient Name:  Kirti Denise    :  1945  MRN: [id number]  Restrictions/Precautions:    Medical/Treatment Diagnosis Information:  Diagnosis: BILATERAL LOWER EXTREMITY LYMPHEDEMA  Treatment Diagnosis: B LE chronic swelling and wounds R > L and decreased B LE strength, decreased functional mobilityPT diagnosis:  Insurance/Certification information:  PT Insurance Information: [address] [place] [address] - no auth/no copay/BMN  Physician Information:  Michel Fuller, GEOFF    Plan of care signed (Y/N): []  Yes [x]  No     Date of Patient follow up with Physician:       Progress Report: []  Yes  [x]  No     Date Range for reporting period:  Beginnin22  PT POC 22 - faxed for signature  Ending    Progress report due (10 Rx/or 30 days whichever is less): visit #10 or 22     Recertification due (POC duration/ or 90 days whichever is less): 22    Visit # Insurance Allowable Auth required? Date Range    +  BMN []  Yes  [x]  No pcy        Latex Allergy:  [x]NO      []YES  Preferred Language for Healthcare:   [x]English       []other:      Functional Scale:                                                                                                      Date assessed:  FOTO physical FS primary measure score = 39; risk adjusted = 47                  22  FOTO physical FS primary measure score = 46                 22    Pain level:  810 LLE     SUBJECTIVE:  Pt states he is prom juliette. Reports his legs are getting smaller. OBJECTIVE:    small opening on R foot. Decreased edema B lower legs with increased edema at knees. 22: observe:    L LE: increased swelling at dorsum of foot and toes. Decreased swelling ankle to below knee.  Foam rectangle at ankle reduces constriction  at open but very red: 3.5cmx2.5 with black area in center of red area: 1.0cm x 0.9cm  Redness present B dorsal feet and R LE below knee. Increased warmth R LE compared to L. Concern for possible cellulitis. RESTRICTIONS/PRECAUTIONS:     Exercises/Interventions:     Therapeutic Exercises (31937) Resistance / level Sets/sec Reps Notes   Nu step See below      Pt educated on exercises to stimulate lymphatic flow        CKC LE ex at // bars or counter       Ankle pumps  Toe DF/PF  SAQ  Gastroc stretch with active DF                    Therapeutic Activities (86061)  (Dynamic activities such as compression, designed to improve functional performance)       Transfers:  sit to/from stand from  and hospital bed   VC for knee, hip, and trunk ext    Applied dressing to wound: PT cleaned area with alcohol wipe and covered with bandage      Compression: trial tensoshape size   applied to B LE - XL   7/15 Educated to follow up with wound care nurse when he returns home due to weeping and need for dressing change. Pt states he is on oral antibiotic      Multilayer compression bandaging to BLE  Stockinette    Horseshoe at B ankles  Foam square at dorsum of foot    - held this date due to wounds    8 cm low stretch compression bandage  12 cm low stretch compression bandage  12 cm low stretch compression bandage    7/29 unwrapped lower extremities. Discussed increased compression at foot and wrapping distal to proximal. Wrote notes for pt's nurse. 8/5, 8/2 unwrapped. Educated to increase compression proximally   8/26 unwrapped LE's - educated to increase compression at feet and ankles and to wrap to just under knee. Notes written for pt's nurse     PN completed 8/2 - objective measures taken. Discussed progress made with therapy.             7/26 educated pt and Arelis (wound care nurse) on bandaging    Ambulation      7/21 pt SOB after   HEP   - LLD knee ext stretch   - quad set   - ankle pump  - LAQ      Transfer w/c <> bed - SBA  Transfer sit <> supine X 1   VC for knee ext    Nu step   S=11, arms =10, workload =3   8/16 deferred due to possible cellulitis    Wearing multilayer bandaging   Rest break    8/30 Unwrapped B LE's. Educated on bandaging up to knee crease. Washed B lower legs with gentle soap and applied lotion. Educated on importance of skin health and keeping legs clean and moisturized.   X 30 min      Home Management  (providing pt education on safety procedures/instructions)       Pt educated on compression as follows:   how to appropriately apply and wear compression  how to maintain appropriate gradient compression  do not sleep with compression garment/tensoshape  signs and symptoms of constriction   when to remove compression       Pt educated on lymphedema prevention and management    7/12 completed during MLD    Pt educated on MLD                            Neuromuscular Re-ed (16111)       balance                     Manual Intervention (61898)      See MLD flowchart below   X 48 min   7/21 deferred due to concern re possible cellulitis                                        Manual Lymph Drainage (MLD):  MLD to B LE , clearing along alternate pathway of  Ipsilateral trunk  to  Ipsilateral axillary  lymph nodes    Clear Nodes 10x each   Neck x   [address]    Axilla x   Abdomen x   Groin x   Popliteal x2 x   Clear Alternate Pathway 10x each   Re-clear alternate pathway   x5 each position x   Location Ipsilateral trunk       Fluid Mobilization 10x each   Re-clear alternate pathway   x5 each position x   Shoulder bracing    Location B LE and ipsilateral trunk       Protein Resorption 10x each   Location LE below knee       Clear Foot/Ankle or Hand 10x each   Achilles x   Bilateral malleolus x   Fan the cards x   Clear dorsum x   Clear through web space x   Clear toes/fingers x   Fluid mobilization x   Re-clear all positions  X5 each x       Modalities:     OTHER:   8/16/22 1121am PT called Sang Issa director of mobility, lifting, ambulation and eccentric single leg control. [] UE / cervical: cervical, scapular, scapulothoracic and UE control with self care, reaching, carrying, lifting, house/yardwork, driving, computer work. NMR and Therapeutic Activities:    [x] (94332 or 06182) Provided verbal/tactile cueing for activities related to improving balance, coordination, kinesthetic sense, posture, motor skill, proprioception and motor activation to allow for proper function of   [x] LE: / Lumbar core, proximal hip and LE with self care and ADLs  [] UE / Cervical: cervical, postural, scapular, scapulothoracic and UE control with self care, carrying, lifting, driving, computer work.   [] (87040) Gait Re-education- Provided training and instruction to the patient for proper LE, core and proximal hip recruitment and positioning and eccentric body weight control with ambulation re-education including up and down stairs     Home Management Training / Self Care:  [] (56113) Provided self-care/home management training related to activities of daily living and compensatory training, and/or use of adaptive equipment for improvement with: ADLs and compensatory training, meal preparation, safety procedures and instruction in use of adaptive equipment, including bathing, grooming, dressing, personal hygiene, basic household cleaning and chores.      Home Exercise Program:    [] (34451) Reviewed/Progressed HEP activities related to strengthening, flexibility, endurance, ROM of   [] LE / Lumbar: core, proximal hip and LE for functional self-care, mobility, lifting and ambulation/stair navigation   [] UE / Cervical: cervical, postural, scapular, scapulothoracic and UE control with self care, reaching, carrying, lifting, house/yardwork, driving, computer work  [] (09670)Reviewed/Progressed HEP activities related to improving balance, coordination, kinesthetic sense, posture, motor skill, proprioception of   [] LE: core, proximal hip and LE for self care, mobility, lifting, and ambulation/stair navigation    [] UE / Cervical: cervical, postural,  scapular, scapulothoracic and UE control with self care, reaching, carrying, lifting, house/yardwork, driving, computer work    Manual Treatments:  PROM / STM / Oscillations-Mobs:  G-I, II, III, IV (PA's, Inf., Post.)  [x] (87489) Provided manual therapy to mobilize LE, proximal hip and/or LS spine soft tissue/joints for the purpose of modulating pain, promoting relaxation,  increasing ROM, reducing/eliminating soft tissue swelling/inflammation/restriction, improving soft tissue extensibility and allowing for proper ROM for normal function with   [x] LE / lumbar: self care, mobility, lifting and ambulation. [] UE / Cervical: self care, reaching, carrying, lifting, house/yardwork, driving, computer work. Modalities:  [] (11972) Vasopneumatic compression: Utilized vasopneumatic compression to decrease edema / swelling for the purpose of improving mobility and quad tone / recruitment which will allow for increased overall function including but not limited to self-care, transfers, ambulation, and ascending / descending stairs. Charges:  Timed Code Treatment Minutes: 80   Total Treatment Minutes: 80     [] EVAL - LOW (19392)   [] EVAL - MOD (88016)  [] EVAL - HIGH (71060)  [] RE-EVAL (46879)  [] GI(48285) x   1    [] Ionto  [] NMR (17282) x       [] Vaso  [x] Manual (47948) x   3   [] Ultrasound  [x] TA x   2     [] Mech Traction (95084)  [] Aquatic Therapy x     [] ES (un) (94274):   [] Home Management Training x  [] ES(attended) (98501)   [] Dry Needling 1-2 muscles (26420):  [] Dry Needling 3+ muscles (580436  [] Group:      [] Other: gaitx1    GOALS:  Patient stated goal: \"Pt wants to be able to stand with his walker and walk household distances\"  [x] Progressing: [] Met: [] Not Met: [] Adjusted     Therapist goals for Patient:   Short Term Goals: To be achieved in: 2 weeks  1.  Independent in HEP and progression per patient tolerance, in order to prevent return of swelling   [] Progressing: [x] Met: [] Not Met: [] Adjusted  2. Patient will have a decrease in swelling/pain to facilitate improvement in movement, function, and ADLs as indicated by improvement with LLIS. [] Progressing: [x] Met: [] Not Met: [] Adjusted     Long Term Goals: To be achieved in: 6 weeks  1. FOTO score of at least 46 to assist with reaching prior level of function. [] Progressing: [x] Met: [] Not Met: [] Adjusted  2. Patient will demonstrate increased AROM/strength of BLE to 4/5 so that pt can resume walking and standing without increase in symptoms. [x] Progressing: [] Met: [] Not Met: [] Adjusted  3. Decrease swelling of BLEs by 5cm so that pt can return to functional activities including standing, walking, squatting, and lifting without increased symptoms or restriction. [x] Progressing: [] Met: [] Not Met: [] Adjusted  4. Patient will be able to stand for >/= 5 minutes in order to improve standing tolerance for performing ADLs. [x] Progressing: [] Met: [] Not Met: [] Adjusted  5. Patient will be able to ambulate short community distances (300-500ft) with the LRAD to improve functional mobility in his home and community. [x] Progressing: [] Met: [] Not Met: [] Adjusted    Overall Progression Towards Functional goals/ Treatment Progress Update:  [x] Patient is progressing as expected towards functional goals listed. [] Progression is slowed due to complexities/Impairments listed. [] Progression has been slowed due to co-morbidities.   [] Plan just implemented, too soon to assess goals progression <30days   [] Goals require adjustment due to lack of progress  [] Patient is not progressing as expected and requires additional follow up with physician  [] Other    Persisting Functional Limitations/Impairments:  []Sleeping []Sitting               [x]Standing [x]Transfers        [x]Walking [x]Kneeling               [x]Stairs [x]Squatting / bending   [x]ADLs []Reaching  [x]Lifting  [x]Housework  []Driving []Job related tasks  []Sports/Recreation []Other:        ASSESSMENT:    Pt with decreased edema in lower legs this date. Increased edema around knees due to bandages not coming up high enough. Wrote note for pt's nurse to wrap to knee crease. Washed and applied lotion to B lower legs this date. Decreased skin flakes and dryness. Bandaging not completed at end of session due to lotion not dry. Educated pt to have nurse bandage tonight/tomorrow. Plan to take measurements NV. Treatment/Activity Tolerance:  [x] Patient able to complete tx [] Patient limited by fatigue  [] Patient limited by pain  [] Patient limited by other medical complications  [] Other:     Prognosis: [x] Good [] Fair  [] Poor    Patient Requires Follow-up: [x] Yes  [] No    Plan for next treatment session:   MLD, compression - bandage B LE below knee , HEP, pt education, lymph pump  B LE, lymph pump for home to help prevent chronic wounds R>L LE, exercise to stimulate lymphatic flow, LE ex, balance, gait, fxnl mobility    PLAN: See eval. PT 2x / week for 6 weeks. + 2x/wk for 6 wks   [x] Continue per plan of care [] Alter current plan (see comments)  [] Plan of care initiated [] Hold pending MD visit [] Discharge    Electronically signed by: Daphne Arriaga, PT, DPT      Note: If patient does not return for scheduled/ recommended follow up visits, this note will serve as a discharge from care along with most recent update on progress.

## 2022-09-01 ENCOUNTER — HOSPITAL ENCOUNTER (OUTPATIENT)
Dept: PHYSICAL THERAPY | Age: 77
Setting detail: THERAPIES SERIES
Discharge: HOME | End: 2022-09-01
Payer: COMMERCIAL

## 2022-09-01 PROCEDURE — 97530 THERAPEUTIC ACTIVITIES: CPT

## 2022-09-01 PROCEDURE — 97140 MANUAL THERAPY 1/> REGIONS: CPT

## 2022-09-01 NOTE — PROGRESS NOTES
[address] [facility] Physical Therapy  Phone: [phone]   Fax: [phone]    Physical Therapy Daily LYMPHEDEMA Treatment Note    Date:  2022    Patient Name:  Estefania Graham    :  1945  MRN: [id number]  Restrictions/Precautions:    Medical/Treatment Diagnosis Information:  Diagnosis: BILATERAL LOWER EXTREMITY LYMPHEDEMA  Treatment Diagnosis: B LE chronic swelling and wounds R > L and decreased B LE strength, decreased functional mobilityPT diagnosis:  Insurance/Certification information:  PT Insurance Information: [address] [place] [address] - no auth/no copay/BMN  Physician Information:  Yesy Jones NP    Plan of care signed (Y/N): []  Yes [x]  No     Date of Patient follow up with Physician:       Progress Report: []  Yes  [x]  No     Date Range for reporting period:  Beginnin22  PT POC 22 - faxed for signature  PN   PN   Ending    Progress report due (10 Rx/or 30 days whichever is less): visit #10 or /34     Recertification due (POC duration/ or 90 days whichever is less): 10/21/22    Visit # Insurance Allowable Auth required? Date Range    +  BMN []  Yes  [x]  No pcy        Latex Allergy:  [x]NO      []YES  Preferred Language for Healthcare:   [x]English       []other:      Functional Scale:                                                                                                      Date assessed:  FOTO physical FS primary measure score = 39; risk adjusted = 47                  22  FOTO physical FS primary measure score = 46                 22  FOTO physical FS primary measure score = 47                 22  Pain level:  8/10 LLE     SUBJECTIVE:  Pt reports he was able to wash his legs again following therapy. Reports he left wraps off for a little and it felt good.        OBJECTIVE:     GIRTH MEASUREMENT  (Tape on skin along anterior tibialis)     Lower Extremity Right (cm) Left  (cm) Right (cm) Left (cm) Right (cm) Left (cm)   Date 6/30/22 6/30/22 8/2 8/2 9/1 9/1   Measurements taken as follows: 0 cm at inf aspect of lateral malleolus and marked on    [] Ant aspect of LE    [x] Lateral aspect of LE    [] sheet                       cm  Widest at hip           cm at waist (at umbilicus), measured while reclined on hospital bed 132.0 cm                     Metatarsal heads 25.7cm 25.7cm 26.1 25.5 25.0 25.5   0 Cm above inf aspect of  LATERAL MALLEOLUS 37.8 35.5 cm 34.9 32.5 35.2 33.9    10 Cm above inf aspect of  LATERAL MALLEOLUS    38.7 33.4cm 39.1 30.6 31.9 32.0   20 Cm above  inf aspec of LATERAL MALLEOLUS 45.3 38.5 44.4 39.8 34.8 39.0   30 Cm above  inf aspect of  LATERAL MALLEOLUS 46.4 41.4 45.5 46.8 39.7 42.0               Total Girth 193.9 174.5 190.0 175.2 166.6  172.4     LLE total girth decreased by 2.8 cm compared to 8/2  RLE total girth decreased by 23.4 cm compared to 8/2 8/30 small opening on R foot. Decreased edema B lower legs with increased edema at knees. 8/16/22: observe:    L LE: increased swelling at dorsum of foot and toes. Decreased swelling ankle to below knee. Foam rectangle at ankle reduces constriction  at ankle  R LE: increased swelling, increased redness and increased warmth compared to L LE. Concern re possible cellulitis. PT called RN at pt's living facility - see below  Deferred CKC ex due to possible cellulitis R LE      8/9 overall reduction in swelling and wounds healing. Distal swelling present at R foot and toes - needs improved compression gradient with bandages  NEW Blister opened R ant/lat shin: 1.3cm x 0.9cm. new red area R dorsal foot: 2\" x 1.5\" (Not open). PT cleaned opened blister area with alcohol wipe and covered with tegaderm - communicated to pt's RN on communication form    8/2    RLE decreased total girth by 3.9 cm   LLE increased total girth by 0.7 cm     7/21   Pt to clinic wearing 2 tensoshape each LE from mid foot to below knee.   Signs of constriction present B LE ant ankle and R LE below knee - inappropriate compression gradient. Dorsal foot swelling increased B - due to constriction at ankles from rolled and doubled tensoshapes  causing constriction  multiple new wounds present B LEs:  L medial calf: 1.5 x 0.8cm, red area L ant ankle: 2.7cnx2.0 cm; R ant shin: 1.7cmx1.7cm, R ant ankle: not yet open but very red: 3.5cmx2.5 with black area in center of red area: 1.0cm x 0.9cm  Redness present B dorsal feet and R LE below knee. Increased warmth R LE compared to L. Concern for possible cellulitis. RESTRICTIONS/PRECAUTIONS:     Exercises/Interventions:     Therapeutic Exercises (70355) Resistance / level Sets/sec Reps Notes   Nu step See below      Pt educated on exercises to stimulate lymphatic flow        CKC LE ex at // bars or counter       Ankle pumps  Toe DF/PF  SAQ  Gastroc stretch with active DF                    Therapeutic Activities (11586)  (Dynamic activities such as compression, designed to improve functional performance)       Transfers:  sit to/from stand from  and hospital bed   VC for knee, hip, and trunk ext    Applied dressing to wound: PT cleaned area with alcohol wipe and covered with bandage      Compression: trial tensoshape size   applied to B LE - XL   7/15 Educated to follow up with wound care nurse when he returns home due to weeping and need for dressing change. Pt states he is on oral antibiotic      Multilayer compression bandaging to BLE  Stockinette    Horseshoe at B ankles  Foam square at dorsum of foot    - held this date due to wounds    8 cm low stretch compression bandage  12 cm low stretch compression bandage  12 cm low stretch compression bandage    7/29 unwrapped lower extremities. Discussed increased compression at foot and wrapping distal to proximal. Wrote notes for pt's nurse. 8/5, 8/2 unwrapped.  Educated to increase compression proximally   8/26 unwrapped LE's - educated to increase compression at feet and ankles and to wrap to just under knee. Notes written for pt's nurse     PN completed 9/1 - objective measures taken. Discussed progress made with therapy. Educated to continue with bandaging, skin care, and ambulation/HEP       X 15 min    X 30 min    7/26 educated pt and Arelis (wound care nurse) on bandaging    Ambulation  95 ft with RW and CGA  54 Ft with RW after nustep    7/21 pt SOB after   HEP   - LLD knee ext stretch   - quad set   - ankle pump  - LAQ      Transfer w/c <> bed - SBA  Transfer sit <> supine X 1   VC for knee ext    Nu step   S=11, arms =10, workload =3 7 min   8/16 deferred due to possible cellulitis    Wearing multilayer bandaging   Rest break    8/30 Unwrapped B LE's. Educated on bandaging up to knee crease. Washed B lower legs with gentle soap and applied lotion. Educated on importance of skin health and keeping legs clean and moisturized.        Home Management  (providing pt education on safety procedures/instructions)       Pt educated on compression as follows:   how to appropriately apply and wear compression  how to maintain appropriate gradient compression  do not sleep with compression garment/tensoshape  signs and symptoms of constriction   when to remove compression       Pt educated on lymphedema prevention and management    7/12 completed during MLD    Pt educated on MLD                            Neuromuscular Re-ed (87502)       balance                     Manual Intervention (07875)      See MLD flowchart below   X 30 min   7/21 deferred due to concern re possible cellulitis                                        Manual Lymph Drainage (MLD):  MLD to B LE , clearing along alternate pathway of  Ipsilateral trunk  to  Ipsilateral axillary  lymph nodes    Clear Nodes 10x each   Neck x   [address]    Axilla x   Abdomen x   Groin x   Popliteal x2 x   Clear Alternate Pathway 10x each   Re-clear alternate pathway   x5 each position x   Location Ipsilateral trunk Fluid Mobilization 10x each   Re-clear alternate pathway   x5 each position x   Shoulder bracing    Location B LE and ipsilateral trunk       Protein Resorption 10x each   Location LE below knee       Clear Foot/Ankle or Hand 10x each   Achilles x   Bilateral malleolus x   Fan the cards x   Clear dorsum x   Clear through web space x   Clear toes/fingers x   Fluid mobilization x   Re-clear all positions  X5 each x       Modalities:     OTHER:   8/16/22 1121am PT called Hema Michael director of nursing next at [phone] re pt- discussed concern re possible new onset cellulitis R  LE. She will call pt;'s MD re possible start of antibiotics for cellulitis. 7/21 pt signed release of info form for PT to communicate with medical staff at [facility] (the [facility] where he [facility])     7/21 PT called Eugenia Ormond NP  re pt- discussed concern re possible new onset cellulitis R and or L LE, new onset wounds, inappropriate compression gradient with 2 tensoshapes that rolled. Liza Horowitz  states she will call in antibiotic. At pt request, PT called SHANIA Gastelum [phone]; unable to LM due to VM full. Called Lisa director of nursing next at [phone] - no answer, no VM. PT wrote note to RNs a pt's independent living facility re need for approp compression gradient and pt will order lymphedema bandages - low stretch 2x8cm, 4x12 cm, 2x15 cm artiflex. 6/30 pt signed release of info for PT to send PT notes and insurance info to [place] at [facility] re possibility of getting lymphedema pump for B LE    Pt education:   8/9 review HEP - see below d/w pt benefit of rewrapping daily, how to void constriction marks and gradually increasing walking duration/distance- wrote this on his note to RN  7/21 extensive pt ed re appropriate compression and compression gradient, need to avoid constriction    6/30   pt ed and provided with form on what compression wrappings to purchase.  Ed on ankle pumps and toe flx/ext to stimulate lymphatic flow. Pt educated on pathology and anatomy of etiology of lymphedema, condition precautions, indications for long term prognosis. Pt was educated on diagnosis; prognosis; PT POC including MLD, compression (role of multilayer bandaging/ compression garments), lymphedema management/prevention of flare ups, role of exercise, HEP, lymphedema pump; expectations for rehab. All pt questions were answered. HEP instruction:  8/9 increase duration of walks by 15-60 sec; goal - increase walk duration so he can do 1-2 laps at facility. Re-wrap daily as able- trial of grey foam at ankles    7/26   Access Code: [email or web address]  URL: [email or web address]  Date: 07/26/2022  Prepared by: Jostin Swann    Exercises  Long Sitting Quad Set - 3 x daily - 7 x weekly - 1 sets - 10 reps - 5 sec hold  Supine Knee Extension Stretch on Towel Roll - 3 x daily - 7 x weekly - 5-6 min hold    7/21 pt to order low stretch compression bandages: 2x8cm, 4x12 cm and artiflex 2x15 cm and bring to next appt. Encouraged pt to try to plan for a staff member to come to appt to be instructed in lymphedema wrapping and approp comrpession gradient    Eval: Pt instructed in lymphedema management/prevention concepts and swipe method of MLD, ankle pumps, toe DF/PF    Therapeutic Exercise and NMR EXR  [] (10549) Provided verbal/tactile cueing for activities related to strengthening, flexibility, endurance, ROM for improvements in  [] LE / Lumbar: LE, proximal hip, and core control with self care, mobility, lifting, ambulation.   [] UE / Cervical: cervical, postural, scapular, scapulothoracic and UE control with self care, reaching, carrying, lifting, house/yardwork, driving, computer work.  [] (89562) Provided verbal/tactile cueing for activities related to improving balance, coordination, kinesthetic sense, posture, motor skill, proprioception to assist with   [] LE / lumbar: LE, proximal hip, and core control in self care, mobility, lifting, ambulation and eccentric single leg control. [] UE / cervical: cervical, scapular, scapulothoracic and UE control with self care, reaching, carrying, lifting, house/yardwork, driving, computer work.   [] (12648) Therapist is in constant attendance of 2 or more patients providing skilled therapy interventions, but not providing any significant amount of measurable one-on-one time to either patient, for improvements in  [] LE / lumbar: LE, proximal hip, and core control in self care, mobility, lifting, ambulation and eccentric single leg control. [] UE / cervical: cervical, scapular, scapulothoracic and UE control with self care, reaching, carrying, lifting, house/yardwork, driving, computer work. NMR and Therapeutic Activities:    [x] (75619 or 32833) Provided verbal/tactile cueing for activities related to improving balance, coordination, kinesthetic sense, posture, motor skill, proprioception and motor activation to allow for proper function of   [x] LE: / Lumbar core, proximal hip and LE with self care and ADLs  [] UE / Cervical: cervical, postural, scapular, scapulothoracic and UE control with self care, carrying, lifting, driving, computer work.   [] (79578) Gait Re-education- Provided training and instruction to the patient for proper LE, core and proximal hip recruitment and positioning and eccentric body weight control with ambulation re-education including up and down stairs     Home Management Training / Self Care:  [] (49226) Provided self-care/home management training related to activities of daily living and compensatory training, and/or use of adaptive equipment for improvement with: ADLs and compensatory training, meal preparation, safety procedures and instruction in use of adaptive equipment, including bathing, grooming, dressing, personal hygiene, basic household cleaning and chores.      Home Exercise Program:    [] (37544) Reviewed/Progressed HEP activities related to strengthening, flexibility, endurance, ROM of   [] LE / Lumbar: core, proximal hip and LE for functional self-care, mobility, lifting and ambulation/stair navigation   [] UE / Cervical: cervical, postural, scapular, scapulothoracic and UE control with self care, reaching, carrying, lifting, house/yardwork, driving, computer work  [] (48660)Reviewed/Progressed HEP activities related to improving balance, coordination, kinesthetic sense, posture, motor skill, proprioception of   [] LE: core, proximal hip and LE for self care, mobility, lifting, and ambulation/stair navigation    [] UE / Cervical: cervical, postural,  scapular, scapulothoracic and UE control with self care, reaching, carrying, lifting, house/yardwork, driving, computer work    Manual Treatments:  PROM / STM / Oscillations-Mobs:  G-I, II, III, IV (PA's, Inf., Post.)  [x] (05277) Provided manual therapy to mobilize LE, proximal hip and/or LS spine soft tissue/joints for the purpose of modulating pain, promoting relaxation,  increasing ROM, reducing/eliminating soft tissue swelling/inflammation/restriction, improving soft tissue extensibility and allowing for proper ROM for normal function with   [x] LE / lumbar: self care, mobility, lifting and ambulation. [] UE / Cervical: self care, reaching, carrying, lifting, house/yardwork, driving, computer work. Modalities:  [] (45050) Vasopneumatic compression: Utilized vasopneumatic compression to decrease edema / swelling for the purpose of improving mobility and quad tone / recruitment which will allow for increased overall function including but not limited to self-care, transfers, ambulation, and ascending / descending stairs.        Charges:  Timed Code Treatment Minutes: 90   Total Treatment Minutes: 90     [] EVAL - LOW (88382)   [] EVAL - MOD (40159)  [] EVAL - HIGH (87463)  [] RE-EVAL (17353)  [] ZH(88072) x   1    [] Ionto  [] NMR (74106) x       [] Vaso  [x] Manual (97089) x   2   [] Ultrasound  [x] TA x   4     [] [facility] Traction (63228)  [] Aquatic Therapy x     [] ES (un) (74230):   [] Home Management Training x  [] ES(attended) (80214)   [] Dry Needling 1-2 muscles (88831):  [] Dry Needling 3+ muscles (003720  [] Group:      [] Other: gaitx1    GOALS:  Patient stated goal: \"Pt wants to be able to stand with his walker and walk household distances\"  [x] Progressing: [] Met: [] Not Met: [] Adjusted     Therapist goals for Patient:   Short Term Goals: To be achieved in: 2 weeks  1. Independent in HEP and progression per patient tolerance, in order to prevent return of swelling   [] Progressing: [x] Met: [] Not Met: [] Adjusted  2. Patient will have a decrease in swelling/pain to facilitate improvement in movement, function, and ADLs as indicated by improvement with LLIS. [] Progressing: [x] Met: [] Not Met: [] Adjusted     Long Term Goals: To be achieved in: 6 weeks  1. FOTO score of at least 46 to assist with reaching prior level of function. [] Progressing: [x] Met: [] Not Met: [] Adjusted  2. Patient will demonstrate increased AROM/strength of BLE to 4/5 so that pt can resume walking and standing without increase in symptoms. [x] Progressing: [] Met: [] Not Met: [] Adjusted  3. Decrease swelling of BLEs by 5cm so that pt can return to functional activities including standing, walking, squatting, and lifting without increased symptoms or restriction. [x] Progressing: [] Met: [] Not Met: [] Adjusted  4. Patient will be able to stand for >/= 5 minutes in order to improve standing tolerance for performing ADLs. [x] Progressing: [] Met: [] Not Met: [] Adjusted  5. Patient will be able to ambulate short community distances (300-500ft) with the LRAD to improve functional mobility in his home and community.    [x] Progressing: [] Met: [] Not Met: [] Adjusted    Overall Progression Towards Functional goals/ Treatment Progress Update:  [x] Patient is progressing as expected towards functional goals listed. [] Progression is slowed due to complexities/Impairments listed. [] Progression has been slowed due to co-morbidities. [] Plan just implemented, too soon to assess goals progression <30days   [] Goals require adjustment due to lack of progress  [] Patient is not progressing as expected and requires additional follow up with physician  [] Other    Persisting Functional Limitations/Impairments:  []Sleeping []Sitting               [x]Standing [x]Transfers        [x]Walking [x]Kneeling               [x]Stairs [x]Squatting / bending   [x]ADLs []Reaching  [x]Lifting  [x]Housework  []Driving []Job related tasks  []Sports/Recreation []Other:        ASSESSMENT:    PN completed this date. Pt with significant decrease in edema in RLE this date with only 1 wound on dorsal ankle crease. Slight decreased edema in LLE. Pt with improved skin health with decreased skin dryness. Educated to continue with moisturizing, bandaging, and exercise to further decrease edema in B LE's and improve functional mobility. Treatment/Activity Tolerance:  [x] Patient able to complete tx [] Patient limited by fatigue  [] Patient limited by pain  [] Patient limited by other medical complications  [] Other:     Prognosis: [x] Good [] Fair  [] Poor    Patient Requires Follow-up: [x] Yes  [] No    Plan for next treatment session:   MLD, compression - bandage B LE below knee , HEP, pt education, lymph pump  B LE, lymph pump for home to help prevent chronic wounds R>L LE, exercise to stimulate lymphatic flow, LE ex, balance, gait, fxnl mobility    PLAN: See eval. PT 2x / week for 6 weeks.  + 2x/wk for 6 wks   [x] Continue per plan of care [] Alter current plan (see comments)  [] Plan of care initiated [] Hold pending MD visit [] Discharge    Electronically signed by: Daphne Arriaga, PT, DPT      Note: If patient does not return for scheduled/ recommended follow up visits, this note will serve as a discharge from care along with most recent update on progress.

## 2022-09-06 ENCOUNTER — HOSPITAL ENCOUNTER (OUTPATIENT)
Dept: PHYSICAL THERAPY | Age: 77
Setting detail: THERAPIES SERIES
Discharge: HOME | End: 2022-09-06
Payer: COMMERCIAL

## 2022-09-06 PROCEDURE — 97140 MANUAL THERAPY 1/> REGIONS: CPT

## 2022-09-06 PROCEDURE — 97116 GAIT TRAINING THERAPY: CPT

## 2022-09-06 PROCEDURE — 97530 THERAPEUTIC ACTIVITIES: CPT

## 2022-09-06 NOTE — FLOWSHEET NOTE
[address] [facility] Physical Therapy  Phone: [phone]   Fax: [phone]    Physical Therapy Daily LYMPHEDEMA Treatment Note    Date:  2022    Patient Name:  Essence Moya    :  1945  MRN: [id number]  Restrictions/Precautions:    Medical/Treatment Diagnosis Information:  Diagnosis: BILATERAL LOWER EXTREMITY LYMPHEDEMA  Treatment Diagnosis: B LE chronic swelling and wounds R > L and decreased B LE strength, decreased functional mobilityPT diagnosis:  Insurance/Certification information:  PT Insurance Information:  [facility] - no auth/no copay/BMN  Physician Information:  Toni Daniels NP    Plan of care signed (Y/N): []  Yes [x]  No     Date of Patient follow up with Physician:       Progress Report: []  Yes  [x]  No     Date Range for reporting period:  Beginnin22  PT POC 22 - faxed for signature  PN   PN   Ending    Progress report due (10 Rx/or 30 days whichever is less): visit #10 or      Recertification due (POC duration/ or 90 days whichever is less): 10/21/22    Visit # Insurance Allowable Auth required? Date Range    +  BMN []  Yes  [x]  No pcy        Latex Allergy:  [x]NO      []YES  Preferred Language for Healthcare:   [x]English       []other:      Functional Scale:                                                                                                      Date assessed:  FOTO physical FS primary measure score = 39; risk adjusted = 47                  22  FOTO physical FS primary measure score = 46                 22  FOTO physical FS primary measure score = 47                 22    Pain level:  4-5/10  B knees    SUBJECTIVE:  Pt reports hx of wounds on LE and infections since . pt reports yesterday was the first day with the new wound care RN Sanchez Mackey at his facility.  Gait is limited by fatigue and feeling like he cannot stand any longer        OBJECTIVE:     [facility], later [facility] MEASUREMENT  (Tape on skin along anterior tibialis)     Lower Extremity Right (cm) Left  (cm) Right (cm) Left (cm) Right (cm) Left (cm)   Date 6/30/22 6/30/22 8/2 8/2 9/1 9/1   Measurements taken as follows: 0 cm at inf aspect of lateral malleolus and marked on    [] Ant aspect of LE    [x] Lateral aspect of LE    [] sheet                       cm  Widest at hip           cm at waist (at umbilicus), measured while reclined on hospital bed 132.0 cm                     Metatarsal heads 25.7cm 25.7cm 26.1 25.5 25.0 25.5   0 Cm above inf aspect of  LATERAL MALLEOLUS 37.8 35.5 cm 34.9 32.5 35.2 33.9    10 Cm above inf aspect of  LATERAL MALLEOLUS    38.7 33.4cm 39.1 30.6 31.9 32.0   20 Cm above  inf aspec of LATERAL MALLEOLUS 45.3 38.5 44.4 39.8 34.8 39.0   30 Cm above  inf aspect of  LATERAL MALLEOLUS 46.4 41.4 45.5 46.8 39.7 42.0               Total Girth 193.9 174.5 190.0 175.2 166.6  172.4     LLE total girth decreased by 2.8 cm compared to 8/2  RLE total girth decreased by 23.4 cm compared to 8/2 8/30 small opening on R foot. Decreased edema B lower legs with increased edema at knees. 8/16/22: observe:    L LE: increased swelling at dorsum of foot and toes. Decreased swelling ankle to below knee. Foam rectangle at ankle reduces constriction  at ankle  R LE: increased swelling, increased redness and increased warmth compared to L LE. Concern re possible cellulitis. PT called RN at pt's living facility - see below  Deferred CKC ex due to possible cellulitis R LE      8/9 overall reduction in swelling and wounds healing. Distal swelling present at R foot and toes - needs improved compression gradient with bandages  NEW Blister opened R ant/lat shin: 1.3cm x 0.9cm. new red area R dorsal foot: 2\" x 1.5\" (Not open).    PT cleaned opened blister area with alcohol wipe and covered with tegaderm - communicated to pt's RN on communication form    8/2    RLE decreased total girth by 3.9 cm   LLE increased total girth by 0.7 cm     7/21   Pt to clinic wearing 2 tensoshape each LE from mid foot to below knee. Signs of constriction present B LE ant ankle and R LE below knee - inappropriate compression gradient. Dorsal foot swelling increased B - due to constriction at ankles from rolled and doubled tensoshapes  causing constriction  multiple new wounds present B LEs:  L medial calf: 1.5 x 0.8cm, red area L ant ankle: 2.7cnx2.0 cm; R ant shin: 1.7cmx1.7cm, R ant ankle: not yet open but very red: 3.5cmx2.5 with black area in center of red area: 1.0cm x 0.9cm  Redness present B dorsal feet and R LE below knee. Increased warmth R LE compared to L. Concern for possible cellulitis. RESTRICTIONS/PRECAUTIONS:     Exercises/Interventions:     Therapeutic Exercises (33238) Resistance / level Sets/sec Reps Notes   Nu step See below      Pt educated on exercises to stimulate lymphatic flow        CKC LE ex at // bars or counter       Ankle pumps  Toe DF/PF  SAQ  Gastroc stretch with active DF 5x10 B  5x10 B    T/o session                 Therapeutic Activities (90224)  (Dynamic activities such as compression, designed to improve functional performance)       Transfers:  sit to/from stand from  and hospital bed   VC for knee, hip, and trunk ext    Applied dressing to wound: PT cleaned area with alcohol wipe and covered with bandage      Compression: trial tensoshape size   applied to B LE - XL   7/15 Educated to follow up with wound care nurse when he returns home due to weeping and need for dressing change. Pt states he is on oral antibiotic      Multilayer compression bandaging to BLE  Stockinette    Horseshoe x2at B ankles  Large Foam rectangle at dorsum of foot and dorsal toes   - held this date due to wounds    8 cm low stretch compression bandage  12 cm low stretch compression bandage  12 cm low stretch compression bandage    7/29 unwrapped lower extremities.  Discussed increased compression at foot and wrapping distal to proximal. Wrote notes for pt's nurse. 8/5, 8/2 unwrapped. Educated to increase compression proximally   8/26 unwrapped LE's - educated to increase compression at feet and ankles and to wrap to just under knee. Notes written for pt's nurse     PN completed 9/1 - objective measures taken. Discussed progress made with therapy. Educated to continue with bandaging, skin care, and ambulation/HEP       X 30min  Added extra foam today - to cover large area at ankles and dorsal toes. Bandages: used 10cm and 12 cm bandage on R and 2x12 cm bandage on L    7/26 educated pt and Arelis (wound care nurse) on bandaging    Ambulation  145 ft with RW and CGA  10 Ft with RW - limited by time constraints and pt needed to use restroom    9/6 gait limited by fatigue 7/21 pt SOB after   HEP   - LLD knee ext stretch   - quad set   - ankle pump  - LAQ      Transfer w/c <> bed - SBA  Transfer sit <> supine X 1   VC for knee ext    Nu step   S=11, arms =10, workload =3 deferred due to time constraints on 9/6/22 - spent additional time on bandaging today    8/16 deferred due to possible cellulitis    Wearing multilayer bandaging   Rest break    8/30 Unwrapped B LE's. Educated on bandaging up to knee crease. Washed B lower legs with gentle soap and applied lotion. Educated on importance of skin health and keeping legs clean and moisturized.   Review with pt re approp bandaging concepts so he can communicate with new RN at facility       Home Management  (providing pt education on safety procedures/instructions)       Pt educated on compression as follows:   how to appropriately apply and wear compression  how to maintain appropriate gradient compression  do not sleep with compression garment/tensoshape  signs and symptoms of constriction   when to remove compression       Pt educated on lymphedema prevention and management    7/12 completed during MLD    Pt educated on MLD                            Neuromuscular Re-ed (16526)       balance Manual Intervention (88503)      See MLD flowchart below   X 50 min   7/21 deferred due to concern re possible cellulitis                                        Manual Lymph Drainage (MLD):  MLD to B LE , clearing along alternate pathway of  Ipsilateral trunk  to  Ipsilateral axillary  lymph nodes    Clear Nodes 10x each   Neck x   [address]    Axilla x   Abdomen x   Groin x   Popliteal x2 x   Clear Alternate Pathway 10x each   Re-clear alternate pathway   x5 each position x   Location Ipsilateral trunk       Fluid Mobilization 10x each   Re-clear alternate pathway   x5 each position x   Shoulder bracing    Location B LE and ipsilateral trunk       Protein Resorption 10x each   Location LE below knee       Clear Foot/Ankle or Hand 10x each   Achilles x   Bilateral malleolus x   Fan the cards x   Clear dorsum x   Clear through web space x   Clear toes/fingers x   Fluid mobilization x   Re-clear all positions  X5 each x       Modalities:     OTHER:   9/6 gave pt handout with written bandaging directions fr new RN at facility    8/16/22 1121am PT called Nancy Salmeron director of nursing next at [phone] re pt- discussed concern re possible new onset cellulitis R  LE. She will call pt;'s MD re possible start of antibiotics for cellulitis. 7/21 pt signed release of info form for PT to communicate with medical staff at [facility]     7/21 PT called Chet Gipson NP  re pt- discussed concern re possible new onset cellulitis R and or L LE, new onset wounds, inappropriate compression gradient with 2 tensoshapes that rolled. Kelley Webber  states she will call in antibiotic. At pt request, PT called SHANIA Crandall [phone]; unable to LM due to VM full. Called Lisa director of nursing next at [phone] - no answer, no VM.      PT wrote note to RNs a pt's independent living facility re need for approp compression gradient and pt will order lymphedema bandages - low stretch 2x8cm, 4x12 cm, 2x15 cm artiflex. 6/30 pt signed release of info for PT to send PT notes and insurance info to [facility] possibility of getting lymphedema pump for B LE    Pt education:   8/9 review HEP - see below d/w pt benefit of rewrapping daily, how to void constriction marks and gradually increasing walking duration/distance- wrote this on his note to RN  7/21 extensive pt ed re appropriate compression and compression gradient, need to avoid constriction    6/30   pt ed and provided with form on what compression wrappings to purchase. Ed on ankle pumps and toe flx/ext to stimulate lymphatic flow. Pt educated on pathology and anatomy of etiology of lymphedema, condition precautions, indications for long term prognosis. Pt was educated on diagnosis; prognosis; PT POC including MLD, compression (role of multilayer bandaging/ compression garments), lymphedema management/prevention of flare ups, role of exercise, HEP, lymphedema pump; expectations for rehab. All pt questions were answered. HEP instruction:  9/6 extra foam under wraps    8/9 increase duration of walks by 15-60 sec; goal - increase walk duration so he can do 1-2 laps at facility. Re-wrap daily as able- trial of grey foam at ankles    7/26   Access Code: [email or web address]  URL: [email or web address]  Date: 07/26/2022  Prepared by: Daphne Arriaga    Exercises  Long Sitting Quad Set - 3 x daily - 7 x weekly - 1 sets - 10 reps - 5 sec hold  Supine Knee Extension Stretch on Towel Roll - 3 x daily - 7 x weekly - 5-6 min hold    7/21 pt to order low stretch compression bandages: 2x8cm, 4x12 cm and artiflex 2x15 cm and bring to next appt.  Encouraged pt to try to plan for a staff member to come to appt to be instructed in lymphedema wrapping and approp comrpession gradient    Eval: Pt instructed in lymphedema management/prevention concepts and swipe method of MLD, ankle pumps, toe DF/PF    Therapeutic Exercise and NMR EXR  [] (44877) Provided verbal/tactile cueing for activities related to strengthening, flexibility, endurance, ROM for improvements in  [] LE / Lumbar: LE, proximal hip, and core control with self care, mobility, lifting, ambulation. [] UE / Cervical: cervical, postural, scapular, scapulothoracic and UE control with self care, reaching, carrying, lifting, house/yardwork, driving, computer work.  [] (72478) Provided verbal/tactile cueing for activities related to improving balance, coordination, kinesthetic sense, posture, motor skill, proprioception to assist with   [] LE / lumbar: LE, proximal hip, and core control in self care, mobility, lifting, ambulation and eccentric single leg control. [] UE / cervical: cervical, scapular, scapulothoracic and UE control with self care, reaching, carrying, lifting, house/yardwork, driving, computer work.   [] (52454) Therapist is in constant attendance of 2 or more patients providing skilled therapy interventions, but not providing any significant amount of measurable one-on-one time to either patient, for improvements in  [] LE / lumbar: LE, proximal hip, and core control in self care, mobility, lifting, ambulation and eccentric single leg control. [] UE / cervical: cervical, scapular, scapulothoracic and UE control with self care, reaching, carrying, lifting, house/yardwork, driving, computer work.      NMR and Therapeutic Activities:    [x] (43506 or 15008) Provided verbal/tactile cueing for activities related to improving balance, coordination, kinesthetic sense, posture, motor skill, proprioception and motor activation to allow for proper function of   [x] LE: / Lumbar core, proximal hip and LE with self care and ADLs  [] UE / Cervical: cervical, postural, scapular, scapulothoracic and UE control with self care, carrying, lifting, driving, computer work.   [] (39948) Gait Re-education- Provided training and instruction to the patient for proper LE, core and proximal hip recruitment and positioning and eccentric body weight control with ambulation re-education including up and down stairs     Home Management Training / Self Care:  [] (94468) Provided self-care/home management training related to activities of daily living and compensatory training, and/or use of adaptive equipment for improvement with: ADLs and compensatory training, meal preparation, safety procedures and instruction in use of adaptive equipment, including bathing, grooming, dressing, personal hygiene, basic household cleaning and chores. Home Exercise Program:    [] (98803) Reviewed/Progressed HEP activities related to strengthening, flexibility, endurance, ROM of   [] LE / Lumbar: core, proximal hip and LE for functional self-care, mobility, lifting and ambulation/stair navigation   [] UE / Cervical: cervical, postural, scapular, scapulothoracic and UE control with self care, reaching, carrying, lifting, house/yardwork, driving, computer work  [] (50323)Reviewed/Progressed HEP activities related to improving balance, coordination, kinesthetic sense, posture, motor skill, proprioception of   [] LE: core, proximal hip and LE for self care, mobility, lifting, and ambulation/stair navigation    [] UE / Cervical: cervical, postural,  scapular, scapulothoracic and UE control with self care, reaching, carrying, lifting, house/yardwork, driving, computer work    Manual Treatments:  PROM / STM / Oscillations-Mobs:  G-I, II, III, IV (PA's, Inf., Post.)  [x] (94897) Provided manual therapy to mobilize LE, proximal hip and/or LS spine soft tissue/joints for the purpose of modulating pain, promoting relaxation,  increasing ROM, reducing/eliminating soft tissue swelling/inflammation/restriction, improving soft tissue extensibility and allowing for proper ROM for normal function with   [x] LE / lumbar: self care, mobility, lifting and ambulation.     [] UE / Cervical: self care, reaching, carrying, lifting, house/yardwork, driving, computer work. Modalities:  [] (57795) Vasopneumatic compression: Utilized vasopneumatic compression to decrease edema / swelling for the purpose of improving mobility and quad tone / recruitment which will allow for increased overall function including but not limited to self-care, transfers, ambulation, and ascending / descending stairs. Charges:  Timed Code Treatment Minutes: 91   Total Treatment Minutes: 91     [] EVAL - LOW (48983)   [] EVAL - MOD (41048)  [] EVAL - HIGH (22691)  [] RE-EVAL (96934)  [] KG(06253) x   1    [] Ionto  [] NMR (66189) x       [] Vaso  [x] Manual (81421) x   3   [] Ultrasound  [x] TA x   2    [] Mech Traction (56965)  [] Aquatic Therapy x     [] ES (un) (55658):   [] Home Management Training x  [] ES(attended) (83448)   [] Dry Needling 1-2 muscles (87893):  [] Dry Needling 3+ muscles (269665  [] Group:      [x] Other: gaitx1    GOALS:  Patient stated goal: \"Pt wants to be able to stand with his walker and walk household distances\"  [x] Progressing: [] Met: [] Not Met: [] Adjusted     Therapist goals for Patient:   Short Term Goals: To be achieved in: 2 weeks  1. Independent in HEP and progression per patient tolerance, in order to prevent return of swelling   [] Progressing: [x] Met: [] Not Met: [] Adjusted  2. Patient will have a decrease in swelling/pain to facilitate improvement in movement, function, and ADLs as indicated by improvement with LLIS. [] Progressing: [x] Met: [] Not Met: [] Adjusted     Long Term Goals: To be achieved in: 6 weeks  1. FOTO score of at least 46 to assist with reaching prior level of function. [] Progressing: [x] Met: [] Not Met: [] Adjusted  2. Patient will demonstrate increased AROM/strength of BLE to 4/5 so that pt can resume walking and standing without increase in symptoms. [x] Progressing: [] Met: [] Not Met: [] Adjusted  3.  Decrease swelling of BLEs by 5cm so that pt can return to functional activities including standing, walking, squatting, and lifting without increased symptoms or restriction. [x] Progressing: [] Met: [] Not Met: [] Adjusted  4. Patient will be able to stand for >/= 5 minutes in order to improve standing tolerance for performing ADLs. [x] Progressing: [] Met: [] Not Met: [] Adjusted  5. Patient will be able to ambulate short community distances (300-500ft) with the LRAD to improve functional mobility in his home and community. [x] Progressing: [] Met: [] Not Met: [] Adjusted    Overall Progression Towards Functional goals/ Treatment Progress Update:  [x] Patient is progressing as expected towards functional goals listed. [] Progression is slowed due to complexities/Impairments listed. [] Progression has been slowed due to co-morbidities. [] Plan just implemented, too soon to assess goals progression <30days   [] Goals require adjustment due to lack of progress  [] Patient is not progressing as expected and requires additional follow up with physician  [] Other    Persisting Functional Limitations/Impairments:  []Sleeping []Sitting               [x]Standing [x]Transfers        [x]Walking [x]Kneeling               [x]Stairs [x]Squatting / bending   [x]ADLs []Reaching  [x]Lifting  [x]Housework  []Driving []Job related tasks  []Sports/Recreation []Other:        ASSESSMENT:  9/6 swelling B LE much improved except significant swelling of feet and toes cont to be a problem R>L. Continues with wound R dorsal foot. Lymph pump should help prevent future wounds - pt reports hx of wounds on LE and infections since 2017. trial of  bandaging with extra foam today - to cover larger area at ankles and entire dorsum of foot - including dorsal toes    9/1 PN completed this date. Pt with significant decrease in edema in RLE this date with only 1 wound on dorsal ankle crease. Slight decreased edema in LLE. Pt with improved skin health with decreased skin dryness.  Educated to continue with moisturizing, bandaging, and exercise to further decrease edema in B LE's and improve functional mobility. Treatment/Activity Tolerance:  [x] Patient able to complete tx [] Patient limited by fatigue  [] Patient limited by pain  [] Patient limited by other medical complications  [] Other:     Prognosis: [x] Good [] Fair  [] Poor    Patient Requires Follow-up: [x] Yes  [] No    Plan for next treatment session:   MLD, compression - bandage B LE below knee , HEP, pt education, lymph pump  B LE, lymph pump for home to help prevent chronic wounds R>L LE, exercise to stimulate lymphatic flow, LE ex, balance, gait, fxnl mobility    PLAN: See eval. PT 2x / week for 6 weeks. + 2x/wk for 6 wks   [x] Continue per plan of care [] Alter current plan (see comments)  [] Plan of care initiated [] Hold pending MD visit [] Discharge    Electronically signed by: Monty Angelucci, PT, DPT      Note: If patient does not return for scheduled/ recommended follow up visits, this note will serve as a discharge from care along with most recent update on progress.

## 2022-09-08 ENCOUNTER — HOSPITAL ENCOUNTER (OUTPATIENT)
Dept: PHYSICAL THERAPY | Age: 77
Setting detail: THERAPIES SERIES
Discharge: HOME | End: 2022-09-08
Payer: COMMERCIAL

## 2022-09-08 PROCEDURE — 97140 MANUAL THERAPY 1/> REGIONS: CPT

## 2022-09-08 PROCEDURE — 97530 THERAPEUTIC ACTIVITIES: CPT

## 2022-09-08 NOTE — FLOWSHEET NOTE
Tues at PT clinic were partially unwrapped on 9/7  when CNP checked his wound. States he was told the wound care RN would be in on 9/7 to re-wrap, but she did not come. His bandages finished unraveling while he slept last night. Reports he usually coordinates bathing with when the wound care RN will come to re-wrap his LE; has been unable to do this since new staff at facility this week. OBJECTIVE:   9/8 pt's LEs B  not washed since [facility] PT washed them on 9/1 during PT session. Wound dorsal R foot: 1.3cmx 1.8cm. red-tiarra Ramu Henriquez drainage; red rectangle of skin near/around wound   9/6 Pt reports hx of wounds on LE and infections since 201    9/1  GIRTH MEASUREMENT  (Tape on skin along anterior tibialis)     Lower Extremity Right (cm) Left  (cm) Right (cm) Left (cm) Right (cm) Left (cm)   Date 6/30/22 6/30/22 8/2 8/2 9/1 9/1   Measurements taken as follows: 0 cm at inf aspect of lateral malleolus and marked on    [] Ant aspect of LE    [x] Lateral aspect of LE    [] sheet                       cm  Widest at hip           cm at waist (at umbilicus), measured while reclined on hospital bed 132.0 cm                     Metatarsal heads 25.7cm 25.7cm 26.1 25.5 25.0 25.5   0 Cm above inf aspect of  LATERAL MALLEOLUS 37.8 35.5 cm 34.9 32.5 35.2 33.9    10 Cm above inf aspect of  LATERAL MALLEOLUS    38.7 33.4cm 39.1 30.6 31.9 32.0   20 Cm above  inf aspec of LATERAL MALLEOLUS 45.3 38.5 44.4 39.8 34.8 39.0   30 Cm above  inf aspect of  LATERAL MALLEOLUS 46.4 41.4 45.5 46.8 39.7 42.0               Total Girth 193.9 174.5 190.0 175.2 166.6  172.4     LLE total girth decreased by 2.8 cm compared to 8/2  RLE total girth decreased by 23.4 cm compared to 8/2 8/30 small opening on R foot. Decreased edema B lower legs with increased edema at knees. 8/16/22: observe:    L LE: increased swelling at dorsum of foot and toes. Decreased swelling ankle to below knee.  Foam rectangle at ankle reduces constriction  at ankle  R LE: increased swelling, increased redness and increased warmth compared to L LE. Concern re possible cellulitis. PT called RN at pt's living facility - see below  Deferred CKC ex due to possible cellulitis R LE      8/9 overall reduction in swelling and wounds healing. Distal swelling present at R foot and toes - needs improved compression gradient with bandages  NEW Blister opened R ant/lat shin: 1.3cm x 0.9cm. new red area R dorsal foot: 2\" x 1.5\" (Not open). PT cleaned opened blister area with alcohol wipe and covered with tegaderm - communicated to pt's RN on communication form    8/2    RLE decreased total girth by 3.9 cm   LLE increased total girth by 0.7 cm     7/21   Pt to clinic wearing 2 tensoshape each LE from mid foot to below knee. Signs of constriction present B LE ant ankle and R LE below knee - inappropriate compression gradient. Dorsal foot swelling increased B - due to constriction at ankles from rolled and doubled tensoshapes  causing constriction  multiple new wounds present B LEs:  L medial calf: 1.5 x 0.8cm, red area L ant ankle: 2.7cnx2.0 cm; R ant shin: 1.7cmx1.7cm, R ant ankle: not yet open but very red: 3.5cmx2.5 with black area in center of red area: 1.0cm x 0.9cm  Redness present B dorsal feet and R LE below knee. Increased warmth R LE compared to L. Concern for possible cellulitis.      RESTRICTIONS/PRECAUTIONS:     Exercises/Interventions:     Therapeutic Exercises (89524) Resistance / level Sets/sec Reps Notes   Nu step See below      Pt educated on exercises to stimulate lymphatic flow        CKC LE ex at // bars or counter       Ankle pumps  Toe DF/PF  SAQ  Gastroc stretch with active DF   T/o session                 Therapeutic Activities (13058)  (Dynamic activities such as compression, designed to improve functional performance)       Transfers:  sit to/from stand from  and hospital bed   VC for knee, hip, and trunk ext    Applied dressing to wound: PT cleaned area with alcohol wipe and covered with bandage      Compression: trial tensoshape size   applied to B LE - XL   7/15 Educated to follow up with wound care nurse when he returns home due to weeping and need for dressing change. Pt states he is on oral antibiotic      Multilayer compression bandaging to BLE  Stockinette    Horseshoe x2at B ankles  Large Foam rectangle at dorsum of foot and dorsal toes   - held this date due to wounds    8 cm low stretch compression bandage  12 cm low stretch compression bandage  12 cm low stretch compression bandage    7/29 unwrapped lower extremities. Discussed increased compression at foot and wrapping distal to proximal. Wrote notes for pt's nurse. 8/5, 8/2 unwrapped. Educated to increase compression proximally   8/26 unwrapped LE's - educated to increase compression at feet and ankles and to wrap to just under knee. Notes written for pt's nurse     PN completed 9/1 - objective measures taken. Discussed progress made with therapy. Educated to continue with bandaging, skin care, and ambulation/HEP       X 30min  used extra foam  - to cover large area at ankles and dorsal toes. Bandages: used 10cm and 12 cm bandage on Land 2x12 cm bandage on R    7/26 educated pt and Arelis (wound care nurse) on bandaging    Ambulation    9/8 deferred due to time constrains - assisted pt to wash B LE and applied lotion    9/6 gait limited by fatigue 7/21 pt SOB after   HEP   - LLD knee ext stretch   - quad set   - ankle pump  - LAQ      Transfer w/c <> bed - SBA  Transfer sit <> supine X 1   VC for knee ext    Nu step   S=11, arms =10, workload =3 deferred due to time constraints on 9/6/22 - spent additional time on bandaging today    8/16 deferred due to possible cellulitis    Wearing multilayer bandaging   Rest break    9/8, 8/30 Unwrapped B LE's. Educated on bandaging up to knee crease. Washed B lower legs with gentle soap and applied lotion.  Educated on importance of skin health and keeping legs clean and moisturized.   X 30 min    Review with pt re approp bandaging concepts so he can communicate with new RN at facility       Home Management  (providing pt education on safety procedures/instructions)       Pt educated on compression as follows:   how to appropriately apply and wear compression  how to maintain appropriate gradient compression  do not sleep with compression garment/tensoshape  signs and symptoms of constriction   when to remove compression       Pt educated on lymphedema prevention and management    7/12 completed during MLD    Pt educated on MLD                            Neuromuscular Re-ed (58543)       balance                     Manual Intervention (21342)      See MLD flowchart below   X 45 min   7/21 deferred due to concern re possible cellulitis                                        Manual Lymph Drainage (MLD):  MLD to B LE , clearing along alternate pathway of  Ipsilateral trunk  to  Ipsilateral axillary  lymph nodes    Clear Nodes 10x each   Neck x   [address]    Axilla x   Abdomen x   Groin x   Popliteal x2 x   Clear Alternate Pathway 10x each   Re-clear alternate pathway   x5 each position x   Location Ipsilateral trunk       Fluid Mobilization 10x each   Re-clear alternate pathway   x5 each position x   Shoulder bracing    Location B LE and ipsilateral trunk       Protein Resorption 10x each   Location LE below knee       Clear Foot/Ankle or Hand 10x each   Achilles x   Bilateral malleolus x   Fan the cards x   Clear dorsum x   Clear through web space x   Clear toes/fingers x   Fluid mobilization x   Re-clear all positions  X5 each x       Modalities:     OTHER:   9/8 gave pt handout for RN at facility requesting pt get daily assist with lower body bathing, application of lotion to LE and bandaging of LE    9/6 gave pt handout with written bandaging directions fr new RN at facility    8/16/22 1121am PT called Usman Banks director of nursing next at [phone] re pt- discussed concern re possible new onset cellulitis R  LE. She will call pt;'s MD re possible start of antibiotics for cellulitis. 7/21 pt signed release of info form for PT to communicate with medical staff at [facility] (the [facility] where he lives)     7/21 PT called Abner Patino NP  re pt- discussed concern re possible new onset cellulitis R and or L LE, new onset wounds, inappropriate compression gradient with 2 tensoshapes that rolled. Go Galdamez  states she will call in antibiotic. At pt request, PT called SHAN[facility] [phone]; unable to LM due to VM full. Called [facility] director of nursing next at [phone] - no answer, no VM. PT wrote note to RNs a pt's independent living facility re need for approp compression gradient and pt will order lymphedema bandages - low stretch 2x8cm, 4x12 cm, 2x15 cm artiflex. 6/30 pt signed release of info for PT to send PT notes and insurance info to [facility] re possibility of getting lymphedema pump for B LE    Pt education:   9/8 pt ed on approp skin care and reveiwed re approp bandaging  8/9 review HEP - see below d/w pt benefit of rewrapping daily, how to void constriction marks and gradually increasing walking duration/distance- wrote this on his note to RN  7/21 extensive pt ed re appropriate compression and compression gradient, need to avoid constriction    6/30   pt ed and provided with form on what compression wrappings to purchase. Ed on ankle pumps and toe flx/ext to stimulate lymphatic flow. Pt educated on pathology and anatomy of etiology of lymphedema, condition precautions, indications for long term prognosis. Pt was educated on diagnosis; prognosis; PT POC including MLD, compression (role of multilayer bandaging/ compression garments), lymphedema management/prevention of flare ups, role of exercise, HEP, lymphedema pump; expectations for rehab. All pt questions were answered.       HEP instruction:  9/6 extra foam under lumbar: LE, proximal hip, and core control in self care, mobility, lifting, ambulation and eccentric single leg control. [] UE / cervical: cervical, scapular, scapulothoracic and UE control with self care, reaching, carrying, lifting, house/yardwork, driving, computer work. NMR and Therapeutic Activities:    [x] (26153 or 01994) Provided verbal/tactile cueing for activities related to improving balance, coordination, kinesthetic sense, posture, motor skill, proprioception and motor activation to allow for proper function of   [x] LE: / Lumbar core, proximal hip and LE with self care and ADLs  [] UE / Cervical: cervical, postural, scapular, scapulothoracic and UE control with self care, carrying, lifting, driving, computer work.   [] (59821) Gait Re-education- Provided training and instruction to the patient for proper LE, core and proximal hip recruitment and positioning and eccentric body weight control with ambulation re-education including up and down stairs     Home Management Training / Self Care:  [] (01856) Provided self-care/home management training related to activities of daily living and compensatory training, and/or use of adaptive equipment for improvement with: ADLs and compensatory training, meal preparation, safety procedures and instruction in use of adaptive equipment, including bathing, grooming, dressing, personal hygiene, basic household cleaning and chores.      Home Exercise Program:    [] (18320) Reviewed/Progressed HEP activities related to strengthening, flexibility, endurance, ROM of   [] LE / Lumbar: core, proximal hip and LE for functional self-care, mobility, lifting and ambulation/stair navigation   [] UE / Cervical: cervical, postural, scapular, scapulothoracic and UE control with self care, reaching, carrying, lifting, house/yardwork, driving, computer work  [] (33447)Reviewed/Progressed HEP activities related to improving balance, coordination, kinesthetic sense, posture, motor skill, proprioception of   [] LE: core, proximal hip and LE for self care, mobility, lifting, and ambulation/stair navigation    [] UE / Cervical: cervical, postural,  scapular, scapulothoracic and UE control with self care, reaching, carrying, lifting, house/yardwork, driving, computer work    Manual Treatments:  PROM / STM / Oscillations-Mobs:  G-I, II, III, IV (PA's, Inf., Post.)  [x] (61311) Provided manual therapy to mobilize LE, proximal hip and/or LS spine soft tissue/joints for the purpose of modulating pain, promoting relaxation,  increasing ROM, reducing/eliminating soft tissue swelling/inflammation/restriction, improving soft tissue extensibility and allowing for proper ROM for normal function with   [x] LE / lumbar: self care, mobility, lifting and ambulation. [] UE / Cervical: self care, reaching, carrying, lifting, house/yardwork, driving, computer work. Modalities:  [] (36172) Vasopneumatic compression: Utilized vasopneumatic compression to decrease edema / swelling for the purpose of improving mobility and quad tone / recruitment which will allow for increased overall function including but not limited to self-care, transfers, ambulation, and ascending / descending stairs.        Charges:  Timed Code Treatment Minutes: 95   Total Treatment Minutes: 95     [] EVAL - LOW (11849)   [] EVAL - MOD (72872)  [] EVAL - HIGH (53625)  [] RE-EVAL (88855)  [] YS(68272) x   1    [] Ionto  [] NMR (66792) x       [] Vaso  [x] Manual (74948) x   3   [] Ultrasound  [x] TA x   3    [] [facility] (07[facility]  [] Aquatic Therapy x     [] ES (un) (97009):   [] Home Management Training x  [] ES(attended) (30067)   [] Dry Needling 1-2 muscles (20963):  [] Dry Needling 3+ muscles (103527  [] Group:      [] Other: gaitx1    GOALS:  Patient stated goal: \"Pt wants to be able to stand with his walker and walk household distances\"  [x] Progressing: [] Met: [] Not Met: [] Adjusted     Therapist goals for Patient:   Short Term Goals: To be achieved in: 2 weeks  1. Independent in HEP and progression per patient tolerance, in order to prevent return of swelling   [] Progressing: [x] Met: [] Not Met: [] Adjusted  2. Patient will have a decrease in swelling/pain to facilitate improvement in movement, function, and ADLs as indicated by improvement with LLIS. [] Progressing: [x] Met: [] Not Met: [] Adjusted     Long Term Goals: To be achieved in: 6 weeks  1. FOTO score of at least 46 to assist with reaching prior level of function. [] Progressing: [x] Met: [] Not Met: [] Adjusted  2. Patient will demonstrate increased AROM/strength of BLE to 4/5 so that pt can resume walking and standing without increase in symptoms. [x] Progressing: [] Met: [] Not Met: [] Adjusted  3. Decrease swelling of BLEs by 5cm so that pt can return to functional activities including standing, walking, squatting, and lifting without increased symptoms or restriction. [x] Progressing: [] Met: [] Not Met: [] Adjusted  4. Patient will be able to stand for >/= 5 minutes in order to improve standing tolerance for performing ADLs. [x] Progressing: [] Met: [] Not Met: [] Adjusted  5. Patient will be able to ambulate short community distances (300-500ft) with the LRAD to improve functional mobility in his home and community. [x] Progressing: [] Met: [] Not Met: [] Adjusted    Overall Progression Towards Functional goals/ Treatment Progress Update:  [x] Patient is progressing as expected towards functional goals listed. [] Progression is slowed due to complexities/Impairments listed. [] Progression has been slowed due to co-morbidities.   [] Plan just implemented, too soon to assess goals progression <30days   [] Goals require adjustment due to lack of progress  [] Patient is not progressing as expected and requires additional follow up with physician  [] Other    Persisting Functional Limitations/Impairments:  []Sleeping []Sitting               [x]Standing [x]Transfers        [x]Walking [x]Kneeling               [x]Stairs [x]Squatting / bending   [x]ADLs []Reaching  [x]Lifting  [x]Housework  []Driving []Job related tasks  []Sports/Recreation []Other:        ASSESSMENT:  9/8 pt needs assist with consistent and excellent skin care and daily bandaging. Staff turnover/changes at his facility has negatively impacted the health of his skin   9/6 swelling B LE much improved except significant swelling of feet and toes cont to be a problem R>L. Continues with wound R dorsal foot. Lymph pump should help prevent future wounds - pt reports hx of wounds on LE and infections since 2017. trial of  bandaging with extra foam today - to cover larger area at ankles and entire dorsum of foot - including dorsal toes    9/1 PN completed this date. Pt with significant decrease in edema in RLE this date with only 1 wound on dorsal ankle crease. Slight decreased edema in LLE. Pt with improved skin health with decreased skin dryness. Educated to continue with moisturizing, bandaging, and exercise to further decrease edema in B LE's and improve functional mobility. Treatment/Activity Tolerance:  [x] Patient able to complete tx [] Patient limited by fatigue  [] Patient limited by pain  [] Patient limited by other medical complications  [] Other:     Prognosis: [x] Good [] Fair  [] Poor    Patient Requires Follow-up: [x] Yes  [] No    Plan for next treatment session:   MLD, compression - bandage B LE below knee , HEP, pt education, lymph pump  B LE, lymph pump for home to help prevent chronic wounds R>L LE, exercise to stimulate lymphatic flow, LE ex, balance, gait, fxnl mobility    PLAN: See eval. PT 2x / week for 6 weeks.  + 2x/wk for 6 wks   [x] Continue per plan of care [] Alter current plan (see comments)  [] Plan of care initiated [] Hold pending MD visit [] Discharge    Electronically signed by: Hollie

## 2022-09-13 ENCOUNTER — HOSPITAL ENCOUNTER (OUTPATIENT)
Dept: PHYSICAL THERAPY | Age: 77
Setting detail: THERAPIES SERIES
Discharge: HOME | End: 2022-09-13
Payer: COMMERCIAL

## 2022-09-13 PROCEDURE — 97140 MANUAL THERAPY 1/> REGIONS: CPT

## 2022-09-13 PROCEDURE — 97530 THERAPEUTIC ACTIVITIES: CPT

## 2022-09-13 NOTE — FLOWSHEET NOTE
[address] [facility] Physical Therapy  Phone: [phone]   Fax: [phone]    Physical Therapy Daily LYMPHEDEMA Treatment Note    Date:  2022    Patient Name:  Melecio Luevano    :  1945  MRN: [id number]  Restrictions/Precautions:    Medical/Treatment Diagnosis Information:  Diagnosis: BILATERAL LOWER EXTREMITY LYMPHEDEMA  Treatment Diagnosis: B LE chronic swelling and wounds R > L and decreased B LE strength, decreased functional mobilityPT diagnosis:  Insurance/Certification information:  PT Insurance Information: [address] [place] [address] - no auth/no copay/BMN  Physician Information:  Toni Hinojosa NP    Plan of care signed (Y/N): []  Yes [x]  No     Date of Patient follow up with Physician:       Progress Report: []  Yes  [x]  No     Date Range for reporting period:  Beginnin22  PT POC 22 - faxed for signature  PN   PN   Ending    Progress report due (10 Rx/or 30 days whichever is less): visit #10 or      Recertification due (POC duration/ or 90 days whichever is less): 10/21/22    Visit # Insurance Allowable Auth required? Date Range    +  BMN []  Yes  [x]  No pcy        Latex Allergy:  [x]NO      []YES  Preferred Language for Healthcare:   [x]English       []other:      Functional Scale:                                                                                                      Date assessed:  FOTO physical FS primary measure score = 39; risk adjusted = 47                  22  FOTO physical FS primary measure score = 46                 22  FOTO physical FS primary measure score = 47                 22    Pain level:  10  B LE    SUBJECTIVE:  Pt reports his wound dressing was changes this morning. Not wearing wraps this date. States he was bandaged Thursday at therapy, Saturday on RLE, and unwrapped this morning. States he talked to director of nursing regarding new wound care nurse.      OBJECTIVE: 9/8 pt's LEs B  not washed since Kaley PT washed them on 9/1 during PT session. Wound dorsal R foot: 1.3cmx 1.8cm. red-tiarra Sara Moose drainage; red rectangle of skin near/around wound   9/6 Pt reports hx of wounds on LE and infections since 201    9/1  GIRTH MEASUREMENT  (Tape on skin along anterior tibialis)     Lower Extremity Right (cm) Left  (cm) Right (cm) Left (cm) Right (cm) Left (cm)   Date 6/30/22 6/30/22 8/2 8/2 9/1 9/1   Measurements taken as follows: 0 cm at inf aspect of lateral malleolus and marked on    [] Ant aspect of LE    [x] Lateral aspect of LE    [] sheet                       cm  Widest at hip           cm at waist (at umbilicus), measured while reclined on hospital bed 132.0 cm                     Metatarsal heads 25.7cm 25.7cm 26.1 25.5 25.0 25.5   0 Cm above inf aspect of  LATERAL MALLEOLUS 37.8 35.5 cm 34.9 32.5 35.2 33.9    10 Cm above inf aspect of  LATERAL MALLEOLUS    38.7 33.4cm 39.1 30.6 31.9 32.0   20 Cm above  inf aspec of LATERAL MALLEOLUS 45.3 38.5 44.4 39.8 34.8 39.0   30 Cm above  inf aspect of  LATERAL MALLEOLUS 46.4 41.4 45.5 46.8 39.7 42.0               Total Girth 193.9 174.5 190.0 175.2 166.6  172.4     LLE total girth decreased by 2.8 cm compared to 8/2  RLE total girth decreased by 23.4 cm compared to 8/2 8/30 small opening on R foot. Decreased edema B lower legs with increased edema at knees. 8/16/22: observe:    L LE: increased swelling at dorsum of foot and toes. Decreased swelling ankle to below knee. Foam rectangle at ankle reduces constriction  at ankle  R LE: increased swelling, increased redness and increased warmth compared to L LE. Concern re possible cellulitis. PT called RN at pt's living facility - see below  Deferred CKC ex due to possible cellulitis R LE      8/9 overall reduction in swelling and wounds healing.   Distal swelling present at R foot and toes - needs improved compression gradient with bandages  NEW Blister opened R ant/lat shin: 1.3cm x 0.9cm. new red area R dorsal foot: 2\" x 1.5\" (Not open). PT cleaned opened blister area with alcohol wipe and covered with tegaderm - communicated to pt's RN on communication form    8/2    RLE decreased total girth by 3.9 cm   LLE increased total girth by 0.7 cm     7/21   Pt to clinic wearing 2 tensoshape each LE from mid foot to below knee. Signs of constriction present B LE ant ankle and R LE below knee - inappropriate compression gradient. Dorsal foot swelling increased B - due to constriction at ankles from rolled and doubled tensoshapes  causing constriction  multiple new wounds present B LEs:  L medial calf: 1.5 x 0.8cm, red area L ant ankle: 2.7cnx2.0 cm; R ant shin: 1.7cmx1.7cm, R ant ankle: not yet open but very red: 3.5cmx2.5 with black area in center of red area: 1.0cm x 0.9cm  Redness present B dorsal feet and R LE below knee. Increased warmth R LE compared to L. Concern for possible cellulitis. RESTRICTIONS/PRECAUTIONS:     Exercises/Interventions:     Therapeutic Exercises (69916) Resistance / level Sets/sec Reps Notes   Nu step See below      Pt educated on exercises to stimulate lymphatic flow        CKC LE ex at // bars or counter       Ankle pumps  Toe DF/PF  SAQ  Gastroc stretch with active DF   T/o session                 Therapeutic Activities (30658)  (Dynamic activities such as compression, designed to improve functional performance)       Transfers:  sit to/from stand from  and hospital bed   VC for knee, hip, and trunk ext    Applied dressing to wound: PT cleaned area with alcohol wipe and covered with bandage      Compression: trial tensoshape size   applied to B LE - XL   7/15 Educated to follow up with wound care nurse when he returns home due to weeping and need for dressing change.  Pt states he is on oral antibiotic      Multilayer compression bandaging to BLE  Stockinette    Horseshoe at R ankle  Large Foam rectangle at dorsum of B feet and dorsal toes   - held this date due to wounds    RLE: 10 cm low stretch compression bandage  12 cm low stretch compression bandage  LLE: 2 x 12 cm low stretch compression bandage    7/29 unwrapped lower extremities. Discussed increased compression at foot and wrapping distal to proximal. Wrote notes for pt's nurse. 8/5, 8/2 unwrapped. Educated to increase compression proximally   8/26 unwrapped LE's - educated to increase compression at feet and ankles and to wrap to just under knee. Notes written for pt's nurse     PN completed 9/1 - objective measures taken. Discussed progress made with therapy. Educated to continue with bandaging, skin care, and ambulation/HEP       X 25 min            7/26 educated pt and Arelis (wound care nurse) on bandaging    Ambulation    9/8 deferred due to time constrains - assisted pt to wash B LE and applied lotion    9/6 gait limited by fatigue 7/21 pt SOB after   HEP   - LLD knee ext stretch   - quad set   - ankle pump  - LAQ      Transfer w/c <> bed - SBA  Transfer sit <> supine X 1   VC for knee ext    Nu step   S=11, arms =10, workload =3   8/16 deferred due to possible cellulitis    Wearing multilayer bandaging   Rest break    9/8, 8/30 Unwrapped B LE's. Educated on bandaging up to knee crease. Washed B lower legs with gentle soap and applied lotion. Educated on importance of skin health and keeping legs clean and moisturized. 9/13 Washed B lower legs with gentle soap and applied lotion. Educated on importance of skin health and keeping legs clean and moisturized. Wrote notes for Pt's nurse.  Provided with phone number for [facility] to obtain home lymphedema pump as he has not been in contact with them recently           X 30 min                9/13 education partially completed during MLD    Home Management  (providing pt education on safety procedures/instructions)       Pt educated on compression as follows:   how to appropriately apply and wear compression  how to maintain appropriate gradient compression  do not sleep with compression garment/tensoshape  signs and symptoms of constriction   when to remove compression       Pt educated on lymphedema prevention and management    7/12 completed during MLD    Pt educated on MLD                            Neuromuscular Re-ed (88863)       balance                     Manual Intervention (90771)      See MLD flowchart below   X 35 min   7/21 deferred due to concern re possible cellulitis                                        Manual Lymph Drainage (MLD):  MLD to B LE , clearing along alternate pathway of  Ipsilateral trunk  to  Ipsilateral axillary  lymph nodes    Clear Nodes 10x each   Neck x   [address]    Axilla x   Abdomen x   Groin x   Popliteal x2 x   Clear Alternate Pathway 10x each   Re-clear alternate pathway   x5 each position x   Location Ipsilateral trunk       Fluid Mobilization 10x each   Re-clear alternate pathway   x5 each position x   Shoulder bracing    Location B LE and ipsilateral trunk       Protein Resorption 10x each   Location LE below knee       Clear Foot/Ankle or Hand 10x each   Achilles x   Bilateral malleolus x   Fan the cards x   Clear dorsum x   Clear through web space x   Clear toes/fingers x   Fluid mobilization x   Re-clear all positions  X5 each x       Modalities:     OTHER:   9/8 gave pt handout for RN at facility requesting pt get daily assist with lower body bathing, application of lotion to LE and bandaging of LE    9/6 gave pt handout with written bandaging directions fr new RN at facility    8/16/22 1121am PT called [place] director of nursing next at [phone] re pt- discussed concern re possible new onset cellulitis R  LE. She will call pt;'s MD re possible start of antibiotics for cellulitis.     7/21 pt signed release of info form for PT to communicate with medical staff at [address] [place] [address] (the [facility] where he lives)     7/21 PT called Karen Nevarez NP  re pt- discussed concern re possible new onset cellulitis R and or L LE, new onset wounds, inappropriate compression gradient with 2 tensoshapes that rolled. Shoshana Spurling  states she will call in antibiotic. At pt request, PT called [facility] [phone]; unable to LM due to VM full. [facility] at [phone] - no answer, no VM. PT wrote note to RNs a pt's independent living facility re need for approp compression gradient and pt will order lymphedema bandages - low stretch 2x8cm, 4x12 cm, 2x15 cm artiflex. 6/30 pt signed release of info for PT to send PT notes and insurance info to [facility] re possibility of getting lymphedema pump for B LE    Pt education:   9/8 pt ed on approp skin care and reveiwed re approp bandaging  8/9 review HEP - see below d/w pt benefit of rewrapping daily, how to void constriction marks and gradually increasing walking duration/distance- wrote this on his note to RN  7/21 extensive pt ed re appropriate compression and compression gradient, need to avoid constriction    6/30   pt ed and provided with form on what compression wrappings to purchase. Ed on ankle pumps and toe flx/ext to stimulate lymphatic flow. Pt educated on pathology and anatomy of etiology of lymphedema, condition precautions, indications for long term prognosis. Pt was educated on diagnosis; prognosis; PT POC including MLD, compression (role of multilayer bandaging/ compression garments), lymphedema management/prevention of flare ups, role of exercise, HEP, lymphedema pump; expectations for rehab. All pt questions were answered. HEP instruction:  9/6 extra foam under wraps    8/9 increase duration of walks by 15-60 sec; goal - increase walk duration so he can do 1-2 laps at facility. Re-wrap daily as able- trial of grey foam at ankles    7/26   Access Code: [email or web address]  URL: [email or web address]  Date: 07/26/2022  Prepared by: Jostin Swann    Exercises  Long Sitting Quad Set - 3 x daily - 7 x weekly - 1 sets - 10 reps - 5 sec hold  Supine Knee Extension Stretch on Towel Roll - 3 x daily - 7 x weekly - 5-6 min hold    7/21 pt to order low stretch compression bandages: 2x8cm, 4x12 cm and artiflex 2x15 cm and bring to next appt. Encouraged pt to try to plan for a staff member to come to appt to be instructed in lymphedema wrapping and approp comrpession gradient    Eval: Pt instructed in lymphedema management/prevention concepts and swipe method of MLD, ankle pumps, toe DF/PF    Therapeutic Exercise and NMR EXR  [] (72118) Provided verbal/tactile cueing for activities related to strengthening, flexibility, endurance, ROM for improvements in  [] LE / Lumbar: LE, proximal hip, and core control with self care, mobility, lifting, ambulation. [] UE / Cervical: cervical, postural, scapular, scapulothoracic and UE control with self care, reaching, carrying, lifting, house/yardwork, driving, computer work.  [] (45763) Provided verbal/tactile cueing for activities related to improving balance, coordination, kinesthetic sense, posture, motor skill, proprioception to assist with   [] LE / lumbar: LE, proximal hip, and core control in self care, mobility, lifting, ambulation and eccentric single leg control. [] UE / cervical: cervical, scapular, scapulothoracic and UE control with self care, reaching, carrying, lifting, house/yardwork, driving, computer work.   [] (09078) Therapist is in constant attendance of 2 or more patients providing skilled therapy interventions, but not providing any significant amount of measurable one-on-one time to either patient, for improvements in  [] LE / lumbar: LE, proximal hip, and core control in self care, mobility, lifting, ambulation and eccentric single leg control. [] UE / cervical: cervical, scapular, scapulothoracic and UE control with self care, reaching, carrying, lifting, house/yardwork, driving, computer work.      NMR and Therapeutic Activities:    [x] (63922 or 83772) Provided verbal/tactile cueing for activities related to improving balance, coordination, kinesthetic sense, posture, motor skill, proprioception and motor activation to allow for proper function of   [x] LE: / Lumbar core, proximal hip and LE with self care and ADLs  [] UE / Cervical: cervical, postural, scapular, scapulothoracic and UE control with self care, carrying, lifting, driving, computer work.   [] (74875) Gait Re-education- Provided training and instruction to the patient for proper LE, core and proximal hip recruitment and positioning and eccentric body weight control with ambulation re-education including up and down stairs     Home Management Training / Self Care:  [] (49484) Provided self-care/home management training related to activities of daily living and compensatory training, and/or use of adaptive equipment for improvement with: ADLs and compensatory training, meal preparation, safety procedures and instruction in use of adaptive equipment, including bathing, grooming, dressing, personal hygiene, basic household cleaning and chores.      Home Exercise Program:    [] (93619) Reviewed/Progressed HEP activities related to strengthening, flexibility, endurance, ROM of   [] LE / Lumbar: core, proximal hip and LE for functional self-care, mobility, lifting and ambulation/stair navigation   [] UE / Cervical: cervical, postural, scapular, scapulothoracic and UE control with self care, reaching, carrying, lifting, house/yardwork, driving, computer work  [] (77505)Reviewed/Progressed HEP activities related to improving balance, coordination, kinesthetic sense, posture, motor skill, proprioception of   [] LE: core, proximal hip and LE for self care, mobility, lifting, and ambulation/stair navigation    [] UE / Cervical: cervical, postural,  scapular, scapulothoracic and UE control with self care, reaching, carrying, lifting, house/yardwork, driving, computer work    Manual Treatments:  PROM / STM / LLIS.  [] Progressing: [x] Met: [] Not Met: [] Adjusted     Long Term Goals: To be achieved in: 6 weeks  1. FOTO score of at least 46 to assist with reaching prior level of function. [] Progressing: [x] Met: [] Not Met: [] Adjusted  2. Patient will demonstrate increased AROM/strength of BLE to 4/5 so that pt can resume walking and standing without increase in symptoms. [x] Progressing: [] Met: [] Not Met: [] Adjusted  3. Decrease swelling of BLEs by 5cm so that pt can return to functional activities including standing, walking, squatting, and lifting without increased symptoms or restriction. [x] Progressing: [] Met: [] Not Met: [] Adjusted  4. Patient will be able to stand for >/= 5 minutes in order to improve standing tolerance for performing ADLs. [x] Progressing: [] Met: [] Not Met: [] Adjusted  5. Patient will be able to ambulate short community distances (300-500ft) with the LRAD to improve functional mobility in his home and community. [x] Progressing: [] Met: [] Not Met: [] Adjusted    Overall Progression Towards Functional goals/ Treatment Progress Update:  [x] Patient is progressing as expected towards functional goals listed. [] Progression is slowed due to complexities/Impairments listed. [] Progression has been slowed due to co-morbidities. [] Plan just implemented, too soon to assess goals progression <30days   [] Goals require adjustment due to lack of progress  [] Patient is not progressing as expected and requires additional follow up with physician  [] Other    Persisting Functional Limitations/Impairments:  []Sleeping []Sitting               [x]Standing [x]Transfers        [x]Walking [x]Kneeling               [x]Stairs [x]Squatting / bending   [x]ADLs []Reaching  [x]Lifting  [x]Housework  []Driving []Job related tasks  []Sports/Recreation []Other:        ASSESSMENT:    9/13 Washed BLE and applied lotion as pt with dry skin on B lower legs and feet, R LE > L LE.  Provided notes for nursing staff at pt's home to wash and moisturize legs daily and wrap prior to NV to allow PT to assess bandaging technique. Pt provided with phone number for [facility] to call regarding lymphedema pump trial as he has not been in contact with them recently. Continue with MLD, exercise, compression, and skin care to decrease edema and improve functional mobility. 9/8 pt needs assist with consistent and excellent skin care and daily bandaging. Staff turnover/changes at his facility has negatively impacted the health of his skin     9/6 swelling B LE much improved except significant swelling of feet and toes cont to be a problem R>L. Continues with wound R dorsal foot. Lymph pump should help prevent future wounds - pt reports hx of wounds on LE and infections since 2017. trial of  bandaging with extra foam today - to cover larger area at ankles and entire dorsum of foot - including dorsal toes    9/1 PN completed this date. Pt with significant decrease in edema in RLE this date with only 1 wound on dorsal ankle crease. Slight decreased edema in LLE. Pt with improved skin health with decreased skin dryness. Educated to continue with moisturizing, bandaging, and exercise to further decrease edema in B LE's and improve functional mobility. Treatment/Activity Tolerance:  [x] Patient able to complete tx [] Patient limited by fatigue  [] Patient limited by pain  [] Patient limited by other medical complications  [] Other:     Prognosis: [x] Good [] Fair  [] Poor    Patient Requires Follow-up: [x] Yes  [] No    Plan for next treatment session:   MLD, compression - bandage B LE below knee , HEP, pt education, lymph pump  B LE, lymph pump for home to help prevent chronic wounds R>L LE, exercise to stimulate lymphatic flow, LE ex, balance, gait, fxnl mobility    PLAN: See eval. PT 2x / week for 6 weeks.  + 2x/wk for 6 wks   [x] Continue per plan of care [] Alter current plan (see comments)  [] Plan of care initiated [] Hold pending MD visit [] Discharge    Electronically signed by: Max Meza, PT, DPT      Note: If patient does not return for scheduled/ recommended follow up visits, this note will serve as a discharge from care along with most recent update on progress.

## 2022-09-15 ENCOUNTER — HOSPITAL ENCOUNTER (OUTPATIENT)
Dept: PHYSICAL THERAPY | Age: 77
Setting detail: THERAPIES SERIES
Discharge: HOME | End: 2022-09-15
Payer: COMMERCIAL

## 2022-09-15 PROCEDURE — 97140 MANUAL THERAPY 1/> REGIONS: CPT

## 2022-09-15 PROCEDURE — 97530 THERAPEUTIC ACTIVITIES: CPT

## 2022-09-15 PROCEDURE — 97116 GAIT TRAINING THERAPY: CPT

## 2022-09-15 NOTE — FLOWSHEET NOTE
[address] [facility]  Phone: [phone]   Fax: [phone]    Physical Therapy Daily LYMPHEDEMA Treatment Note    Date:  9/15/2022    Patient Name:  Wilda Arcos    :  1945  MRN: [id number]  Restrictions/Precautions:    Medical/Treatment Diagnosis Information:  Diagnosis: BILATERAL LOWER EXTREMITY LYMPHEDEMA  Treatment Diagnosis: B LE chronic swelling and wounds R > L and decreased B LE strength, decreased functional mobilityPT diagnosis:  Insurance/Certification information:  PT Insurance Information:  [facility] - no auth/no copay/BMN  Physician Information:  Tashia Mcnamara NP    Plan of care signed (Y/N): []  Yes [x]  No     Date of Patient follow up with Physician:       Progress Report: []  Yes  [x]  No     Date Range for reporting period:  Beginnin22  PT POC 22 - faxed for signature  PN   PN   Ending    Progress report due (10 Rx/or 30 days whichever is less): visit #10 or      Recertification due (POC duration/ or 90 days whichever is less): 10/21/22    Visit # Insurance Allowable Auth required? Date Range    +  BMN []  Yes  [x]  No pcy        Latex Allergy:  [x]NO      []YES  Preferred Language for Healthcare:   [x]English       []other:      Functional Scale:                                                                                                      Date assessed:  FOTO physical FS primary measure score = 39; risk adjusted = 47                  22  FOTO physical FS primary measure score = 46                 22  FOTO physical FS primary measure score = 47                 22    Pain level:  5/10  B LE    SUBJECTIVE:    States he talked to [employer] of nursing on  regarding new wound care nurse - she told pt she would check into everything - daily wound care and daily wrapping.  States previous wound care nurse did it daily and now it is being done every other day and doesn't get done daily, even if he requests it. Pt reports he hasnt heard back from her. Pt states he feels frustrated by lack of wound care and lack of daily wrapping. Pt reports he thinks other pts at [facility] are not getting appropriate wound care. OBJECTIVE:   9/15 pt to clinic without wraps on - states he removed them yesterday bc they were falling off and staff did not come when requested to put back on. Increased pitting edema today. New wound R ant shin: [id number] scab surrounded by [id number] red area. States he talked to [facility] director of nursing on 9/13 regarding new wound care nurse - she told pt she would check into everything - daily wound care and daily wrapping. Pt reports he hasn't heard back from her.    9/8 pt's LEs B  not washed since [facility] PT washed them on 9/1 during PT session. Wound dorsal R foot: 1.3cmx 1.8cm.  red-tiarra Juanpablo Jazmine drainage; red rectangle of skin near/around wound   9/6 Pt reports hx of wounds on LE and infections since 201    9/1  GIRTH MEASUREMENT  (Tape on skin along anterior tibialis)     Lower Extremity Right (cm) Left  (cm) Right (cm) Left (cm) Right (cm) Left (cm)   Date 6/30/22 6/30/22 8/2 8/2 9/1 9/1   Measurements taken as follows: 0 cm at inf aspect of lateral malleolus and marked on    [] Ant aspect of LE    [x] Lateral aspect of LE    [] sheet                       cm  Widest at hip           cm at waist (at umbilicus), measured while reclined on hospital bed 132.0 cm                     Metatarsal heads 25.7cm 25.7cm 26.1 25.5 25.0 25.5   0 Cm above inf aspect of  LATERAL MALLEOLUS 37.8 35.5 cm 34.9 32.5 35.2 33.9    10 Cm above inf aspect of  LATERAL MALLEOLUS    38.7 33.4cm 39.1 30.6 31.9 32.0   20 Cm above  inf aspec of LATERAL MALLEOLUS 45.3 38.5 44.4 39.8 34.8 39.0   30 Cm above  inf aspect of  LATERAL MALLEOLUS 46.4 41.4 45.5 46.8 39.7 42.0               Total Girth 193.9 174.5 190.0 175.2 166.6  172.4     LLE total girth decreased by 2.8 cm pumps  Toe DF/PF  SAQ  Gastroc stretch with active DF 2x10 B  2x10 B    T/o session                 Therapeutic Activities (94934)  (Dynamic activities such as compression, designed to improve functional performance)       Transfers:  sit to/from stand from  and hospital bed   VC for knee, hip, and trunk ext    Applied dressing to wound: PT cleaned area with alcohol wipe and covered with bandage      Compression: trial tensoshape size   applied to B LE - XL   7/15 Educated to follow up with wound care nurse when he returns home due to weeping and need for dressing change. Pt states he is on oral antibiotic      Multilayer compression bandaging to BLE  Stockinette    Horseshoe at R ankle  Large Foam rectangle at dorsum of B feet and dorsal toes   - held this date due to wounds    RLE: 2x12 cm low stretch compression bandage  LLE: 2 x 12 cm low stretch compression bandage    7/29 unwrapped lower extremities. Discussed increased compression at foot and wrapping distal to proximal. Wrote notes for pt's nurse. 8/5, 8/2 unwrapped. Educated to increase compression proximally   8/26 unwrapped LE's - educated to increase compression at feet and ankles and to wrap to just under knee. Notes written for pt's nurse     PN completed 9/1 - objective measures taken. Discussed progress made with therapy.  Educated to continue with bandaging, skin care, and ambulation/HEP       X 25 min            7/26 educated pt and Arelis (wound care nurse) on bandaging    Ambulation  145 ft with RW and CGA  0 Ft with RW - limited by time constraints   9/8 deferred due to time constrains - assisted pt to wash B LE and applied lotion    9/6 gait limited by fatigue 7/21 pt SOB after   HEP   - LLD knee ext stretch   - quad set   - ankle pump  - LAQ      Transfer w/c <> bed - SBA  Transfer sit <> supine X 1   VC for knee ext    Nu step   S=11, arms =10, workload =3   8/16 deferred due to possible cellulitis    Wearing multilayer bandaging   Rest break     9/8, 8/30 Unwrapped B LE's. Educated on bandaging up to knee crease. Washed B lower legs with gentle soap and applied lotion. Educated on importance of skin health and keeping legs clean and moisturized. 9/15, 9/13 Washed B lower legs with gentle soap and applied lotion. Educated on importance of skin health and keeping legs clean and moisturized. Wrote notes for Pt's nurse. Provided with phone number for [facility] medical to obtain home lymphedema pump as he has not been in contact with them recently           X 30 min  Requested staff at [address] do daily wound care /dressing change and daily wrapping of LE. Advised we are happy to teach staff how to wrap.                 9/13 education partially completed during MLD    Home Management  (providing pt education on safety procedures/instructions)       Pt educated on compression as follows:   how to appropriately apply and wear compression  how to maintain appropriate gradient compression  do not sleep with compression garment/tensoshape  signs and symptoms of constriction   when to remove compression       Pt educated on lymphedema prevention and management    7/12 completed during MLD    Pt educated on MLD                            Neuromuscular Re-ed (33742)       balance                     Manual Intervention (56373)      See MLD flowchart below   X 35 min   7/21 deferred due to concern re possible cellulitis                                        Manual Lymph Drainage (MLD):  MLD to B LE , clearing along alternate pathway of  Ipsilateral trunk  to  Ipsilateral axillary  lymph nodes    Clear Nodes 10x each   Neck x   [address]    Axilla x   Abdomen x   Groin x   Popliteal x2 x   Clear Alternate Pathway 10x each   Re-clear alternate pathway   x5 each position x   Location Ipsilateral trunk       Fluid Mobilization 10x each   Re-clear alternate pathway   x5 each position x   Shoulder bracing    Location B LE and ipsilateral trunk lymphedema pump for B LE    Pt education:   9/8 pt ed on approp skin care and reveiwed re approp bandaging  8/9 review HEP - see below d/w pt benefit of rewrapping daily, how to void constriction marks and gradually increasing walking duration/distance- wrote this on his note to RN  7/21 extensive pt ed re appropriate compression and compression gradient, need to avoid constriction    6/30   pt ed and provided with form on what compression wrappings to purchase. Ed on ankle pumps and toe flx/ext to stimulate lymphatic flow. Pt educated on pathology and anatomy of etiology of lymphedema, condition precautions, indications for long term prognosis. Pt was educated on diagnosis; prognosis; PT POC including MLD, compression (role of multilayer bandaging/ compression garments), lymphedema management/prevention of flare ups, role of exercise, HEP, lymphedema pump; expectations for rehab. All pt questions were answered. HEP instruction:  9/6 extra foam under wraps    8/9 increase duration of walks by 15-60 sec; goal - increase walk duration so he can do 1-2 laps at facility. Re-wrap daily as able- trial of grey foam at ankles    7/26   Access Code: [email or web address]  URL: [email or web address]  Date: 07/26/2022  Prepared by: Fito Hawk    Exercises  Long Sitting Quad Set - 3 x daily - 7 x weekly - 1 sets - 10 reps - 5 sec hold  Supine Knee Extension Stretch on Towel Roll - 3 x daily - 7 x weekly - 5-6 min hold    7/21 pt to order low stretch compression bandages: 2x8cm, 4x12 cm and artiflex 2x15 cm and bring to next appt.  Encouraged pt to try to plan for a staff member to come to appt to be instructed in lymphedema wrapping and approp comrpession gradient    Eval: Pt instructed in lymphedema management/prevention concepts and swipe method of MLD, ankle pumps, toe DF/PF    Therapeutic Exercise and NMR EXR  [] (64992) Provided verbal/tactile cueing for activities related to strengthening, flexibility, endurance, ROM for improvements in  [] LE / Lumbar: LE, proximal hip, and core control with self care, mobility, lifting, ambulation. [] UE / Cervical: cervical, postural, scapular, scapulothoracic and UE control with self care, reaching, carrying, lifting, house/yardwork, driving, computer work.  [] (57433) Provided verbal/tactile cueing for activities related to improving balance, coordination, kinesthetic sense, posture, motor skill, proprioception to assist with   [] LE / lumbar: LE, proximal hip, and core control in self care, mobility, lifting, ambulation and eccentric single leg control. [] UE / cervical: cervical, scapular, scapulothoracic and UE control with self care, reaching, carrying, lifting, house/yardwork, driving, computer work.   [] (30608) Therapist is in constant attendance of 2 or more patients providing skilled therapy interventions, but not providing any significant amount of measurable one-on-one time to either patient, for improvements in  [] LE / lumbar: LE, proximal hip, and core control in self care, mobility, lifting, ambulation and eccentric single leg control. [] UE / cervical: cervical, scapular, scapulothoracic and UE control with self care, reaching, carrying, lifting, house/yardwork, driving, computer work.      NMR and Therapeutic Activities:    [x] (82039 or 27395) Provided verbal/tactile cueing for activities related to improving balance, coordination, kinesthetic sense, posture, motor skill, proprioception and motor activation to allow for proper function of   [x] LE: / Lumbar core, proximal hip and LE with self care and ADLs  [] UE / Cervical: cervical, postural, scapular, scapulothoracic and UE control with self care, carrying, lifting, driving, computer work.   [] (20306) Gait Re-education- Provided training and instruction to the patient for proper LE, core and proximal hip recruitment and positioning and eccentric body weight control with ambulation re-education including up and down stairs     Home Management Training / Self Care:  [] (77011) Provided self-care/home management training related to activities of daily living and compensatory training, and/or use of adaptive equipment for improvement with: ADLs and compensatory training, meal preparation, safety procedures and instruction in use of adaptive equipment, including bathing, grooming, dressing, personal hygiene, basic household cleaning and chores. Home Exercise Program:    [] (38365) Reviewed/Progressed HEP activities related to strengthening, flexibility, endurance, ROM of   [] LE / Lumbar: core, proximal hip and LE for functional self-care, mobility, lifting and ambulation/stair navigation   [] UE / Cervical: cervical, postural, scapular, scapulothoracic and UE control with self care, reaching, carrying, lifting, house/yardwork, driving, computer work  [] (16153)Reviewed/Progressed HEP activities related to improving balance, coordination, kinesthetic sense, posture, motor skill, proprioception of   [] LE: core, proximal hip and LE for self care, mobility, lifting, and ambulation/stair navigation    [] UE / Cervical: cervical, postural,  scapular, scapulothoracic and UE control with self care, reaching, carrying, lifting, house/yardwork, driving, computer work    Manual Treatments:  PROM / STM / Oscillations-Mobs:  G-I, II, III, IV (PA's, Inf., Post.)  [x] (85941) Provided manual therapy to mobilize LE, proximal hip and/or LS spine soft tissue/joints for the purpose of modulating pain, promoting relaxation,  increasing ROM, reducing/eliminating soft tissue swelling/inflammation/restriction, improving soft tissue extensibility and allowing for proper ROM for normal function with   [x] LE / lumbar: self care, mobility, lifting and ambulation. [] UE / Cervical: self care, reaching, carrying, lifting, house/yardwork, driving, computer work.      Modalities:  [] (20872) Vasopneumatic compression: Utilized vasopneumatic compression to decrease edema / swelling for the purpose of improving mobility and quad tone / recruitment which will allow for increased overall function including but not limited to self-care, transfers, ambulation, and ascending / descending stairs. Charges:  Timed Code Treatment Minutes: 105   Total Treatment Minutes: 105     [] EVAL - LOW (46221)   [] EVAL - MOD (12717)  [] EVAL - HIGH (89709)  [] RE-EVAL (40183)  [] XD(81534) x   1    [] Ionto  [] NMR (28941) x       [] Vaso  [x] Manual (90647) x   2   [] Ultrasound  [x] TA x   4    [] Mech Traction (81855)  [] Aquatic Therapy x     [] ES (un) (32049):   [] Home Management Training x  [] ES(attended) (27171)   [] Dry Needling 1-2 muscles (08092):  [] Dry Needling 3+ muscles (635825  [] Group:      [x] Other: gaitx1    GOALS:  Patient stated goal: \"Pt wants to be able to stand with his walker and walk household distances\"  [x] Progressing: [] Met: [] Not Met: [] Adjusted     Therapist goals for Patient:   Short Term Goals: To be achieved in: 2 weeks  1. Independent in HEP and progression per patient tolerance, in order to prevent return of swelling   [] Progressing: [x] Met: [] Not Met: [] Adjusted  2. Patient will have a decrease in swelling/pain to facilitate improvement in movement, function, and ADLs as indicated by improvement with LLIS. [] Progressing: [x] Met: [] Not Met: [] Adjusted     Long Term Goals: To be achieved in: 6 weeks  1. FOTO score of at least 46 to assist with reaching prior level of function. [] Progressing: [x] Met: [] Not Met: [] Adjusted  2. Patient will demonstrate increased AROM/strength of BLE to 4/5 so that pt can resume walking and standing without increase in symptoms. [x] Progressing: [] Met: [] Not Met: [] Adjusted  3. Decrease swelling of BLEs by 5cm so that pt can return to functional activities including standing, walking, squatting, and lifting without increased symptoms or restriction.    [x] Progressing: [] Met: daily and wrap prior to NV to allow PT to assess bandaging technique. Pt provided with phone number for [facility] to call regarding lymphedema pump trial as he has not been in contact with them recently. Continue with MLD, exercise, compression, and skin care to decrease edema and improve functional mobility. 9/8 pt needs assist with consistent and excellent skin care and daily bandaging. Staff turnover/changes at his facility has negatively impacted the health of his skin     9/6 swelling B LE much improved except significant swelling of feet and toes cont to be a problem R>L. Continues with wound R dorsal foot. Lymph pump should help prevent future wounds - pt reports hx of wounds on LE and infections since 2017. trial of  bandaging with extra foam today - to cover larger area at ankles and entire dorsum of foot - including dorsal toes    9/1 PN completed this date. Pt with significant decrease in edema in RLE this date with only 1 wound on dorsal ankle crease. Slight decreased edema in LLE. Pt with improved skin health with decreased skin dryness. Educated to continue with moisturizing, bandaging, and exercise to further decrease edema in B LE's and improve functional mobility. Treatment/Activity Tolerance:  [x] Patient able to complete tx [] Patient limited by fatigue  [] Patient limited by pain  [] Patient limited by other medical complications  [] Other:     Prognosis: [x] Good [] Fair  [] Poor    Patient Requires Follow-up: [x] Yes  [] No    Plan for next treatment session:   MLD, compression - bandage B LE below knee , HEP, pt education, lymph pump  B LE, lymph pump for home to help prevent chronic wounds R>L LE, exercise to stimulate lymphatic flow, LE ex, balance, gait, fxnl mobility    PLAN: See eval. PT 2x / week for 6 weeks.  + 2x/wk for 6 wks   [x] Continue per plan of care [] Alter current plan (see comments)  [] Plan of care initiated [] Hold pending MD visit [] Discharge    Electronically signed by: Hari Lake PT, DPT      Note: If patient does not return for scheduled/ recommended follow up visits, this note will serve as a discharge from care along with most recent update on progress.

## 2022-09-20 ENCOUNTER — HOSPITAL ENCOUNTER (OUTPATIENT)
Dept: PHYSICAL THERAPY | Age: 77
Setting detail: THERAPIES SERIES
Discharge: HOME | End: 2022-09-20
Payer: COMMERCIAL

## 2022-09-20 PROCEDURE — 97530 THERAPEUTIC ACTIVITIES: CPT

## 2022-09-20 PROCEDURE — 97140 MANUAL THERAPY 1/> REGIONS: CPT

## 2022-09-20 NOTE — FLOWSHEET NOTE
[address] [facility] Therapy  Phone: [phone]   Fax: [phone]    Physical Therapy Daily LYMPHEDEMA Treatment Note    Date:  2022    Patient Name:  Nacho Becerril    :  1945  MRN: [id number]  Restrictions/Precautions:    Medical/Treatment Diagnosis Information:  Diagnosis: BILATERAL LOWER EXTREMITY LYMPHEDEMA  Treatment Diagnosis: B LE chronic swelling and wounds R > L and decreased B LE strength, decreased functional mobilityPT diagnosis:  Insurance/Certification information:  PT Insurance Information: [address] [place] [address] - no auth/no copay/BMN  Physician Information:  Chase Abreu NP    Plan of care signed (Y/N): []  Yes [x]  No     Date of Patient follow up with Physician:       Progress Report: []  Yes  [x]  No     Date Range for reporting period:  Beginnin22  PT POC 22 - faxed for signature  PN   PN   Ending    Progress report due (10 Rx/or 30 days whichever is less): visit #10 or      Recertification due (POC duration/ or 90 days whichever is less): 10/21/22    Visit # Insurance Allowable Auth required? Date Range    +  BMN []  Yes  [x]  No pcy        Latex Allergy:  [x]NO      []YES  Preferred Language for Healthcare:   [x]English       []other:      Functional Scale:                                                                                                      Date assessed:  FOTO physical FS primary measure score = 39; risk adjusted = 47                  22  FOTO physical FS primary measure score = 46                 22  FOTO physical FS primary measure score = 47                 22    Pain level:  5/10  B LE    SUBJECTIVE:    Pt reports he had his legs wrapped and washed last night. States he was told he does not need his legs wrapped at night and one bandage should be wrapped toes to knee and second knee to toes. States he told nursing both should go from toes to knee. OBJECTIVE:   9/20 skin irritation on dorsum of R foot around border of wound care bandage    9/15 pt to clinic without wraps on - states he removed them yesterday bc they were falling off and staff did not come when requested to put back on. Increased pitting edema today. New wound R ant shin: [id number] scab surrounded by [id number] red area. States he talked to Lillie director of nursing on 9/13 regarding new wound care nurse - she told pt she would check into everything - daily wound care and daily wrapping. Pt reports he hasn't heard back from her.    9/8 pt's LEs B  not washed since Kaley PT washed them on 9/1 during PT session. Wound dorsal R foot: 1.3cmx 1.8cm. red-tiarra Sulma Grate drainage; red rectangle of skin near/around wound   9/6 Pt reports hx of wounds on LE and infections since 201    9/1  GIRTH MEASUREMENT  (Tape on skin along anterior tibialis)     Lower Extremity Right (cm) Left  (cm) Right (cm) Left (cm) Right (cm) Left (cm)   Date 6/30/22 6/30/22 8/2 8/2 9/1 9/1   Measurements taken as follows: 0 cm at inf aspect of lateral malleolus and marked on    [] Ant aspect of LE    [x] Lateral aspect of LE    [] sheet                       cm  Widest at hip           cm at waist (at umbilicus), measured while reclined on hospital bed 132.0 cm                     Metatarsal heads 25.7cm 25.7cm 26.1 25.5 25.0 25.5   0 Cm above inf aspect of  LATERAL MALLEOLUS 37.8 35.5 cm 34.9 32.5 35.2 33.9    10 Cm above inf aspect of  LATERAL MALLEOLUS    38.7 33.4cm 39.1 30.6 31.9 32.0   20 Cm above  inf aspec of LATERAL MALLEOLUS 45.3 38.5 44.4 39.8 34.8 39.0   30 Cm above  inf aspect of  LATERAL MALLEOLUS 46.4 41.4 45.5 46.8 39.7 42.0               Total Girth 193.9 174.5 190.0 175.2 166.6  172.4     LLE total girth decreased by 2.8 cm compared to 8/2  RLE total girth decreased by 23.4 cm compared to 8/2 8/30 small opening on R foot. Decreased edema B lower legs with increased edema at knees.      8/16/22: observe:    L performance)       Transfers:  sit to/from stand from  and hospital bed   VC for knee, hip, and trunk ext    Applied dressing to wound: PT cleaned area with alcohol wipe and covered with bandage      Compression: trial tensoshape size   applied to B LE - XL   7/15 Educated to follow up with wound care nurse when he returns home due to weeping and need for dressing change. Pt states he is on oral antibiotic      Multilayer compression bandaging to BLE  Stockinette    Horseshoe at B ankle  Large Foam rectangle at dorsum of B feet and dorsal toes   - held this date due to wounds    2x12 cm low stretch compression bandage    7/29 unwrapped lower extremities. Discussed increased compression at foot and wrapping distal to proximal. Wrote notes for pt's nurse. 8/5, 8/2 unwrapped. Educated to increase compression proximally   9/20, 8/26 unwrapped LE's - educated to increase compression at feet and ankles and to wrap to just under knee. Notes written for pt's nurse     PN completed 9/1 - objective measures taken. Discussed progress made with therapy. Educated to continue with bandaging, skin care, and ambulation/HEP       X 30 min total            7/26 educated pt and Arelis (wound care nurse) on bandaging    Ambulation    9/8 deferred due to time constrains - assisted pt to wash B LE and applied lotion    9/6 gait limited by fatigue 7/21 pt SOB after   HEP   - LLD knee ext stretch   - quad set   - ankle pump  - LAQ      Transfer w/c <> bed - SBA  Transfer sit <> supine X 1   VC for knee ext    Nu step   S=11, arms =10, workload =3   8/16 deferred due to possible cellulitis    Wearing multilayer bandaging   Rest break     9/8, 8/30 Unwrapped B LE's. Educated on bandaging up to knee crease. Washed B lower legs with gentle soap and applied lotion. Educated on importance of skin health and keeping legs clean and moisturized. 9/15, 9/13 Washed B lower legs with gentle soap and applied lotion.  Educated on importance of skin health and keeping legs clean and moisturized. Wrote notes for Pt's nurse.  Provided with phone number for [facility] medical to obtain home lymphedema pump as he has not been in contact with them recently                           9/13 education partially completed during MLD    Home Management  (providing pt education on safety procedures/instructions)       Pt educated on compression as follows:   how to appropriately apply and wear compression  how to maintain appropriate gradient compression  do not sleep with compression garment/tensoshape  signs and symptoms of constriction   when to remove compression       Pt educated on lymphedema prevention and management    7/12 completed during MLD    Pt educated on MLD                            Neuromuscular Re-ed (56456)       balance                     Manual Intervention (23458)      See MLD flowchart below   X 40 min   7/21 deferred due to concern re possible cellulitis                                        Manual Lymph Drainage (MLD):  MLD to B LE , clearing along alternate pathway of  Ipsilateral trunk  to  Ipsilateral axillary  lymph nodes    Clear Nodes 10x each   Neck x   [address]    Axilla x   Abdomen x   Groin x   Popliteal x2 x   Clear Alternate Pathway 10x each   Re-clear alternate pathway   x5 each position x   Location Ipsilateral trunk       Fluid Mobilization 10x each   Re-clear alternate pathway   x5 each position x   Shoulder bracing    Location B LE and ipsilateral trunk       Protein Resorption 10x each   Location LE below knee       Clear Foot/Ankle or Hand 10x each   Achilles x   Bilateral malleolus x   Fan the cards x   Clear dorsum x   Clear through web space x   Clear toes/fingers x   Fluid mobilization x   Re-clear all positions  X5 each x       Modalities:     OTHER:   9/20 Mariaa Tyler, PT, DPT contacted [facility] and spoke with Pauline Kilpatrick RN regarding need for compression daily when lower extremities are in a dependent position. Phone call transferred to Kalli Mccoy, PT, DPT who spoke with wound care nurse, Paul Delacruz regarding pt's compression. Discussed need for skin care and bandaging daily with gradient compression wrapping from distal to proximal from toes to just below knee. Paul Delacruz states this is the current POC. PT provided pt with note requesting daily skin care and compression distal to proximal.      9/15 gave pt written note requesting daily wound care and daily bandage changes. Advised we are happy to teach facility staff how to wrap. Advised pt to contact Lexis at [facility] to schedule pump demo. Advised pt to look into should he request (and pay for ) additional 1-2 units of ADLs assist a day so staff can/will wrap his legs? Pt is agreeable    9/8 gave pt handout for RN at [facility] requesting pt get daily assist with lower body bathing, application of lotion to LE and bandaging of LE    9/6 gave pt handout with written bandaging directions fr new RN at facility    8/16/22 1121am PT called Melva Feng director of nursing next at [phone] re pt- discussed concern re possible new onset cellulitis R  LE. She will call pt;'s MD re possible start of antibiotics for cellulitis. 7/21 pt signed release of info form for PT to communicate with medical staff at [facility] (the [facility] where he lives)     7/21 PT called Shade Briggs NP  re pt- discussed concern re possible new onset cellulitis R and or L LE, new onset wounds, inappropriate compression gradient with 2 tensoshapes that rolled. Sherron Johnson  states she will call in antibiotic. At pt request, PT called RN Douglas Espinal [phone]; unable to LM due to VM full. Called Lisa director of nursing next at [phone] - no answer, no VM. PT wrote note to RNs a pt's independent living facility re need for approp compression gradient and pt will order lymphedema bandages - low stretch 2x8cm, 4x12 cm, 2x15 cm artiflex.       6/30 pt signed release of info for PT to send PT notes and insurance info to Garrett at [employer] re possibility of getting lymphedema pump for B LE    Pt education:   9/8 pt ed on approp skin care and reveiwed re approp bandaging  8/9 review HEP - see below d/w pt benefit of rewrapping daily, how to void constriction marks and gradually increasing walking duration/distance- wrote this on his note to RN  7/21 extensive pt ed re appropriate compression and compression gradient, need to avoid constriction    6/30   pt ed and provided with form on what compression wrappings to purchase. Ed on ankle pumps and toe flx/ext to stimulate lymphatic flow. Pt educated on pathology and anatomy of etiology of lymphedema, condition precautions, indications for long term prognosis. Pt was educated on diagnosis; prognosis; PT POC including MLD, compression (role of multilayer bandaging/ compression garments), lymphedema management/prevention of flare ups, role of exercise, HEP, lymphedema pump; expectations for rehab. All pt questions were answered. HEP instruction:  9/6 extra foam under wraps    8/9 increase duration of walks by 15-60 sec; goal - increase walk duration so he can do 1-2 laps at facility. Re-wrap daily as able- trial of grey foam at ankles    7/26   Access Code: [email or web address]  URL: [email or web address]  Date: 07/26/2022  Prepared by: Ivette Barone    Exercises  Long Sitting Quad Set - 3 x daily - 7 x weekly - 1 sets - 10 reps - 5 sec hold  Supine Knee Extension Stretch on Towel Roll - 3 x daily - 7 x weekly - 5-6 min hold    7/21 pt to order low stretch compression bandages: 2x8cm, 4x12 cm and artiflex 2x15 cm and bring to next appt.  Encouraged pt to try to plan for a staff member to come to appt to be instructed in lymphedema wrapping and approp comrpession gradient    Eval: Pt instructed in lymphedema management/prevention concepts and swipe method of MLD, ankle pumps, toe DF/PF    Therapeutic Exercise and NMR EXR  [] (68796) Provided verbal/tactile cueing for activities related to strengthening, flexibility, endurance, ROM for improvements in  [] LE / Lumbar: LE, proximal hip, and core control with self care, mobility, lifting, ambulation. [] UE / Cervical: cervical, postural, scapular, scapulothoracic and UE control with self care, reaching, carrying, lifting, house/yardwork, driving, computer work.  [] (27871) Provided verbal/tactile cueing for activities related to improving balance, coordination, kinesthetic sense, posture, motor skill, proprioception to assist with   [] LE / lumbar: LE, proximal hip, and core control in self care, mobility, lifting, ambulation and eccentric single leg control. [] UE / cervical: cervical, scapular, scapulothoracic and UE control with self care, reaching, carrying, lifting, house/yardwork, driving, computer work.   [] (35781) Therapist is in constant attendance of 2 or more patients providing skilled therapy interventions, but not providing any significant amount of measurable one-on-one time to either patient, for improvements in  [] LE / lumbar: LE, proximal hip, and core control in self care, mobility, lifting, ambulation and eccentric single leg control. [] UE / cervical: cervical, scapular, scapulothoracic and UE control with self care, reaching, carrying, lifting, house/yardwork, driving, computer work.      NMR and Therapeutic Activities:    [x] (02669 or 38528) Provided verbal/tactile cueing for activities related to improving balance, coordination, kinesthetic sense, posture, motor skill, proprioception and motor activation to allow for proper function of   [x] LE: / Lumbar core, proximal hip and LE with self care and ADLs  [] UE / Cervical: cervical, postural, scapular, scapulothoracic and UE control with self care, carrying, lifting, driving, computer work.   [] (04740) Gait Re-education- Provided training and instruction to the patient for proper LE, core and proximal hip recruitment and positioning and eccentric body weight control with ambulation re-education including up and down stairs     Home Management Training / Self Care:  [] (66082) Provided self-care/home management training related to activities of daily living and compensatory training, and/or use of adaptive equipment for improvement with: ADLs and compensatory training, meal preparation, safety procedures and instruction in use of adaptive equipment, including bathing, grooming, dressing, personal hygiene, basic household cleaning and chores. Home Exercise Program:    [] (70302) Reviewed/Progressed HEP activities related to strengthening, flexibility, endurance, ROM of   [] LE / Lumbar: core, proximal hip and LE for functional self-care, mobility, lifting and ambulation/stair navigation   [] UE / Cervical: cervical, postural, scapular, scapulothoracic and UE control with self care, reaching, carrying, lifting, house/yardwork, driving, computer work  [] (91997)Reviewed/Progressed HEP activities related to improving balance, coordination, kinesthetic sense, posture, motor skill, proprioception of   [] LE: core, proximal hip and LE for self care, mobility, lifting, and ambulation/stair navigation    [] UE / Cervical: cervical, postural,  scapular, scapulothoracic and UE control with self care, reaching, carrying, lifting, house/yardwork, driving, computer work    Manual Treatments:  PROM / STM / Oscillations-Mobs:  G-I, II, III, IV (PA's, Inf., Post.)  [x] (78572) Provided manual therapy to mobilize LE, proximal hip and/or LS spine soft tissue/joints for the purpose of modulating pain, promoting relaxation,  increasing ROM, reducing/eliminating soft tissue swelling/inflammation/restriction, improving soft tissue extensibility and allowing for proper ROM for normal function with   [x] LE / lumbar: self care, mobility, lifting and ambulation.     [] UE / Cervical: self care, reaching, carrying, lifting, house/yardwork, driving, computer work.     Modalities:  [] (35847) Vasopneumatic compression: Utilized vasopneumatic compression to decrease edema / swelling for the purpose of improving mobility and quad tone / recruitment which will allow for increased overall function including but not limited to self-care, transfers, ambulation, and ascending / descending stairs. Charges:  Timed Code Treatment Minutes: 77   Total Treatment Minutes: 77     [] EVAL - LOW (81432)   [] EVAL - MOD (98683)  [] EVAL - HIGH (20473)  [] RE-EVAL (64866)  [] MX(55645) x   1    [] Ionto  [] NMR (75683) x       [] Vaso  [x] Manual (01836) x   3   [] Ultrasound  [x] TA x   2    [] Mech Traction (17231)  [] Aquatic Therapy x     [] ES (un) (91560):   [] Home Management Training x  [] ES(attended) (33658)   [] Dry Needling 1-2 muscles (66866):  [] Dry Needling 3+ muscles (657921  [] Group:      [] Other: gaitx1    GOALS:  Patient stated goal: \"Pt wants to be able to stand with his walker and walk household distances\"  [x] Progressing: [] Met: [] Not Met: [] Adjusted     Therapist goals for Patient:   Short Term Goals: To be achieved in: 2 weeks  1. Independent in HEP and progression per patient tolerance, in order to prevent return of swelling   [] Progressing: [x] Met: [] Not Met: [] Adjusted  2. Patient will have a decrease in swelling/pain to facilitate improvement in movement, function, and ADLs as indicated by improvement with LLIS. [] Progressing: [x] Met: [] Not Met: [] Adjusted     Long Term Goals: To be achieved in: 6 weeks  1. FOTO score of at least 46 to assist with reaching prior level of function. [] Progressing: [x] Met: [] Not Met: [] Adjusted  2. Patient will demonstrate increased AROM/strength of BLE to 4/5 so that pt can resume walking and standing without increase in symptoms. [x] Progressing: [] Met: [] Not Met: [] Adjusted  3.  Decrease swelling of BLEs by 5cm so that pt can return to functional activities including standing, walking, squatting, and lifting without increased symptoms or restriction. [x] Progressing: [] Met: [] Not Met: [] Adjusted  4. Patient will be able to stand for >/= 5 minutes in order to improve standing tolerance for performing ADLs. [x] Progressing: [] Met: [] Not Met: [] Adjusted  5. Patient will be able to ambulate short community distances (300-500ft) with the LRAD to improve functional mobility in his home and community. [x] Progressing: [] Met: [] Not Met: [] Adjusted    Overall Progression Towards Functional goals/ Treatment Progress Update:  [x] Patient is progressing as expected towards functional goals listed. [] Progression is slowed due to complexities/Impairments listed. [] Progression has been slowed due to co-morbidities. [] Plan just implemented, too soon to assess goals progression <30days   [] Goals require adjustment due to lack of progress  [] Patient is not progressing as expected and requires additional follow up with physician  [] Other    Persisting Functional Limitations/Impairments:  []Sleeping []Sitting               [x]Standing [x]Transfers        [x]Walking [x]Kneeling               [x]Stairs [x]Squatting / bending   [x]ADLs []Reaching  [x]Lifting  [x]Housework  []Driving []Job related tasks  []Sports/Recreation []Other:        ASSESSMENT:    9/20 Pt with skin irritation around bandage on R dorsum of foot. Contacted staff at [facility] this date regarding appropriate skin care and bandaging. See above. Pt provided with note regarding distal to proximal compression and daily skin care. Continue with MLD, compression, and addition of home lymphedema pump to further decrease edema and fibrotic tissue. 9/15 New wound R ant shin: [id number] scab surrounded by [id number] red area. Increased pitting edema today - attribute this to staff at [facility] no longer wrapping his LE daily. Pt very frustrated by decreased wound and lymphedema care. Pt talking to head RN about it .   Gave pt contact info for mara but advised pt to be careful how he approaches this   States he talked to [address] [place] [address] director of nursing on 9/13 regarding new wound care nurse - she told pt she would check into everything - daily wound care and daily wrapping. Pt reports he hasn't heard back from her.    9/13 Washed BLE and applied lotion as pt with dry skin on B lower legs and feet, R LE > L LE. Provided notes for nursing staff at pt's home to wash and moisturize legs daily and wrap prior to NV to allow PT to assess bandaging technique. Pt provided with phone number for [facility] to call regarding lymphedema pump trial as he has not been in contact with them recently. Continue with MLD, exercise, compression, and skin care to decrease edema and improve functional mobility. 9/8 pt needs assist with consistent and excellent skin care and daily bandaging. Staff turnover/changes at his facility has negatively impacted the health of his skin     9/6 swelling B LE much improved except significant swelling of feet and toes cont to be a problem R>L. Continues with wound R dorsal foot. Lymph pump should help prevent future wounds - pt reports hx of wounds on LE and infections since 2017. trial of  bandaging with extra foam today - to cover larger area at ankles and entire dorsum of foot - including dorsal toes    9/1 PN completed this date. Pt with significant decrease in edema in RLE this date with only 1 wound on dorsal ankle crease. Slight decreased edema in LLE. Pt with improved skin health with decreased skin dryness. Educated to continue with moisturizing, bandaging, and exercise to further decrease edema in B LE's and improve functional mobility.      Treatment/Activity Tolerance:  [x] Patient able to complete tx [] Patient limited by fatigue  [] Patient limited by pain  [] Patient limited by other medical complications  [] Other:     Prognosis: [x] Good [] Fair  [] Poor    Patient Requires Follow-up: [x] Yes  [] No    Plan for next treatment session:   MLD, compression - bandage B LE below knee , HEP, pt education, lymph pump  B LE, lymph pump for home to help prevent chronic wounds R>L LE, exercise to stimulate lymphatic flow, LE ex, balance, gait, fxnl mobility    PLAN: See eval. PT 2x / week for 6 weeks. + 2x/wk for 6 wks   [x] Continue per plan of care [] Alter current plan (see comments)  [] Plan of care initiated [] Hold pending MD visit [] Discharge    Electronically signed by: Melisa Gallegos, PT, DPT      Note: If patient does not return for scheduled/ recommended follow up visits, this note will serve as a discharge from care along with most recent update on progress.

## 2022-09-22 ENCOUNTER — HOSPITAL ENCOUNTER (OUTPATIENT)
Dept: PHYSICAL THERAPY | Age: 77
Setting detail: THERAPIES SERIES
Discharge: HOME | End: 2022-09-22
Payer: COMMERCIAL

## 2022-09-22 PROCEDURE — 97530 THERAPEUTIC ACTIVITIES: CPT

## 2022-09-22 PROCEDURE — 97116 GAIT TRAINING THERAPY: CPT

## 2022-09-22 PROCEDURE — 97140 MANUAL THERAPY 1/> REGIONS: CPT

## 2022-09-22 NOTE — FLOWSHEET NOTE
[address] [facility] Physical Therapy  Phone: [phone]   Fax: [phone]    Physical Therapy Daily LYMPHEDEMA Treatment Note    Date:  2022    Patient Name:  Carlos Enrique Shaw    :  1945  MRN: [id number]  Restrictions/Precautions:    Medical/Treatment Diagnosis Information:  Diagnosis: BILATERAL LOWER EXTREMITY LYMPHEDEMA  Treatment Diagnosis: B LE chronic swelling and wounds R > L and decreased B LE strength, decreased functional mobilityPT diagnosis:  Insurance/Certification information:  PT Insurance Information:  [facility] - no auth/no copay/BMN  Physician Information:  Mendel Clinton NP    Plan of care signed (Y/N): []  Yes [x]  No     Date of Patient follow up with Physician:       Progress Report: []  Yes  [x]  No     Date Range for reporting period:  Beginnin22  PT POC 22 - faxed for signature  PN   PN   Ending    Progress report due (10 Rx/or 30 days whichever is less): visit #10 or      Recertification due (POC duration/ or 90 days whichever is less): 10/21/22    Visit # Insurance Allowable Auth required? Date Range    +  BMN []  Yes  [x]  No pcy        Latex Allergy:  [x]NO      []YES  Preferred Language for Healthcare:   [x]English       []other:      Functional Scale:                                                                                                      Date assessed:  FOTO physical FS primary measure score = 39; risk adjusted = 47                  22  FOTO physical FS primary measure score = 46                 22  FOTO physical FS primary measure score = 47                 22    Pain level:  0/10  B LE    SUBJECTIVE:    Pt reports he had his legs were re-assessed for wounds and re-wrapped on 22 after therapy. LE not washed yet at [place] [id number] point.  Reports they are supposed to wash his LE MWF  Pt reports feeling like he has had swelling in abdomen - that has fluctuated since 2019    OBJECTIVE:       9/20 skin irritation on dorsum of R foot around border of wound care bandage    9/15 pt to clinic without wraps on - states he removed them yesterday bc they were falling off and staff did not come when requested to put back on. Increased pitting edema today. New wound R ant shin: [id number] scab surrounded by [id number] red area. States he talked to Lillie director of nursing on 9/13 regarding new wound care nurse - she told pt she would check into everything - daily wound care and daily wrapping. Pt reports he hasn't heard back from her.    9/8 pt's LEs B  not washed since Kaley PT washed them on 9/1 during PT session. Wound dorsal R foot: 1.3cmx 1.8cm.  red-tiarra Melvinia Denominational drainage; red rectangle of skin near/around wound   9/6 Pt reports hx of wounds on LE and infections since 201    9/1  GIRTH MEASUREMENT  (Tape on skin along anterior tibialis)     Lower Extremity Right (cm) Left  (cm) Right (cm) Left (cm) Right (cm) Left (cm)    Date 6/30/22 6/30/22 8/2 8/2 9/1 9/1 9/22   Measurements taken as follows: 0 cm at inf aspect of lateral malleolus and marked on    [] Ant aspect of LE    [x] Lateral aspect of LE    [] sheet                         cm  Widest at hip            cm at waist (at umbilicus), measured while reclined on hospital bed 132.0 cm       134.5 cm on exhale  135.0 cm on inhale    While reclined on hospital bed                Metatarsal heads 25.7cm 25.7cm 26.1 25.5 25.0 25.5    0 Cm above inf aspect of  LATERAL MALLEOLUS 37.8 35.5 cm 34.9 32.5 35.2 33.9     10 Cm above inf aspect of  LATERAL MALLEOLUS    38.7 33.4cm 39.1 30.6 31.9 32.0    20 Cm above  inf aspec of LATERAL MALLEOLUS 45.3 38.5 44.4 39.8 34.8 39.0    30 Cm above  inf aspect of  LATERAL MALLEOLUS 46.4 41.4 45.5 46.8 39.7 42.0                 Total Girth 193.9 174.5 190.0 175.2 166.6  172.4      LLE total girth decreased by 2.8 cm compared to 8/2  RLE total girth decreased by 23.4 cm compared to 8/2 8/30 small opening on R foot. Decreased edema B lower legs with increased edema at knees. 8/16/22: observe:    L LE: increased swelling at dorsum of foot and toes. Decreased swelling ankle to below knee. Foam rectangle at ankle reduces constriction  at ankle  R LE: increased swelling, increased redness and increased warmth compared to L LE. Concern re possible cellulitis. PT called RN at pt's living facility - see below  Deferred CKC ex due to possible cellulitis R LE      8/9 overall reduction in swelling and wounds healing. Distal swelling present at R foot and toes - needs improved compression gradient with bandages  NEW Blister opened R ant/lat shin: 1.3cm x 0.9cm. new red area R dorsal foot: 2\" x 1.5\" (Not open). PT cleaned opened blister area with alcohol wipe and covered with tegaderm - communicated to pt's RN on communication form    8/2    RLE decreased total girth by 3.9 cm   LLE increased total girth by 0.7 cm     7/21   Pt to clinic wearing 2 tensoshape each LE from mid foot to below knee. Signs of constriction present B LE ant ankle and R LE below knee - inappropriate compression gradient. Dorsal foot swelling increased B - due to constriction at ankles from rolled and doubled tensoshapes  causing constriction  multiple new wounds present B LEs:  L medial calf: 1.5 x 0.8cm, red area L ant ankle: 2.7cnx2.0 cm; R ant shin: 1.7cmx1.7cm, R ant ankle: not yet open but very red: 3.5cmx2.5 with black area in center of red area: 1.0cm x 0.9cm  Redness present B dorsal feet and R LE below knee. Increased warmth R LE compared to L. Concern for possible cellulitis.      RESTRICTIONS/PRECAUTIONS:     Exercises/Interventions:     Therapeutic Exercises (26061) Resistance / level Sets/sec Reps Notes   Nu step See below      Pt educated on exercises to stimulate lymphatic flow        CKC LE ex at // bars or counter       Ankle pumps  Toe DF/PF  SAQ  Gastroc stretch with active DF   T/o session Therapeutic Activities (94567)  (Dynamic activities such as compression, designed to improve functional performance)       Transfers:  sit to/from stand from  and hospital bed   VC for knee, hip, and trunk ext    Applied dressing to wound: PT cleaned area with alcohol wipe and covered with bandage      Compression: trial tensoshape size   applied to B LE - XL   7/15 Educated to follow up with wound care nurse when he returns home due to weeping and need for dressing change. Pt states he is on oral antibiotic      Multilayer compression bandaging to BLE  Stockinette    Horseshoe at B ankle  Large Foam rectangle at dorsum of B feet and dorsal toes   - held this date due to wounds    2x12 cm low stretch compression bandage    7/29 unwrapped lower extremities. Discussed increased compression at foot and wrapping distal to proximal. Wrote notes for pt's nurse. 8/5, 8/2 unwrapped. Educated to increase compression proximally   9/20, 8/26 unwrapped LE's - educated to increase compression at feet and ankles and to wrap to just under knee. Notes written for pt's nurse     PN completed 9/1 - objective measures taken. Discussed progress made with therapy. Educated to continue with bandaging, skin care, and ambulation/HEP       X 30 min total            7/26 educated pt and Arelis (wound care nurse) on bandaging    Ambulation  145 ft with RW and CGA/SBA    9/8 deferred due to time constrains - assisted pt to wash B LE and applied lotion    9/6 gait limited by fatigue 7/21 pt SOB after   HEP   - LLD knee ext stretch   - quad set   - ankle pump  - LAQ      Transfer w/c <> bed - SBA  Transfer sit to/form stand at   Transfer sit <> supine X 1  X 1   VC for knee ext    Nu step   S=11, arms =10, workload =3   8/16 deferred due to possible cellulitis    Wearing multilayer bandaging   Rest break     9/229/8, 8/30 Unwrapped B LE's. Educated on bandaging up to knee crease. Washed B lower legs with gentle soap and applied lotion. Educated on importance of skin health and keeping legs clean and moisturized. 9/15, 9/13 Washed B lower legs with gentle soap and applied lotion. Educated on importance of skin health and keeping legs clean and moisturized. Wrote notes for Pt's nurse.  Provided with phone number for [facility] medical to obtain home lymphedema pump as he has not been in contact with them recently 20 min                          9/13 education partially completed during MLD    Home Management  (providing pt education on safety procedures/instructions)       Pt educated on compression as follows:   how to appropriately apply and wear compression  how to maintain appropriate gradient compression  do not sleep with compression garment/tensoshape  signs and symptoms of constriction   when to remove compression       Pt educated on lymphedema prevention and management    7/12 completed during MLD    Pt educated on MLD                            Neuromuscular Re-ed (27703)       balance                     Manual Intervention (87424)      See MLD flowchart below   X 34 min   7/21 deferred due to concern re possible cellulitis                                        Manual Lymph Drainage (MLD):  MLD to B LE , clearing along alternate pathway of  Ipsilateral trunk  to  Ipsilateral axillary  lymph nodes    Clear Nodes 10x each   Neck x   [address]    Axilla x   Abdomen x   Groin x   Popliteal x2 x   Clear Alternate Pathway 10x each   Re-clear alternate pathway   x5 each position x   Location Ipsilateral trunk       Fluid Mobilization 10x each   Re-clear alternate pathway   x5 each position x   Shoulder bracing    Location B LE and ipsilateral trunk       Protein Resorption 10x each   Location LE below knee       Clear Foot/Ankle or Hand 10x each   Achilles x   Bilateral malleolus x   Fan the cards x   Clear dorsum x   Clear through web space x   Clear toes/fingers x   Fluid mobilization x   Re-clear all positions  X5 each x       Modalities: OTHER: 9/22 pt ed re how to advocate for himself for LE washing and bandaging - pt agreeable. D/w pt PT will call khai's juwan during next session if needed. 9/20 Kellie Lopez, PT, DPT contacted [facility] and spoke with Rashi Silveira, RN regarding need for compression daily when lower extremities are in a dependent position. Phone call transferred to Ivette Barone, PT, DPT who spoke with wound care nurse, Ganesh William regarding pt's compression. Discussed need for skin care and bandaging daily with gradient compression wrapping from distal to proximal from toes to just below knee. Ganesh William states this is the current POC. PT provided pt with note requesting daily skin care and compression distal to proximal.      9/15 gave pt written note requesting daily wound care and daily bandage changes. Advised we are happy to teach facility staff how to wrap. Advised pt to contact Lexis at [facility] to schedule pump demo. Advised pt to look into should he request (and pay for ) additional 1-2 units of ADLs assist a day so staff can/will wrap his legs? Pt is agreeable    9/8 gave pt handout for RN at facility requesting pt get daily assist with lower body bathing, application of lotion to LE and bandaging of LE    9/6 gave pt handout with written bandaging directions fr new RN at facility    8/16/22 1121am PT called Lupe Feldman director of nursing next at [phone] re pt- discussed concern re possible new onset cellulitis R  LE. She will call pt;'s MD re possible start of antibiotics for cellulitis. 7/21 pt signed release of info form for PT to communicate with medical staff at [facility] (the senior community where he [facility])     7/21 PT called Stephy Alexander NP  re pt- discussed concern re possible new onset cellulitis R and or L LE, new onset wounds, inappropriate compression gradient with 2 tensoshapes that rolled. [facility]  states she will call in antibiotic.   At pt request, PT called SHANIA Hamlin [phone]; unable to LM due to VM full. [facility] nursing next at [phone] - no answer, no VM. PT wrote note to RNs a pt's independent living facility re need for approp compression gradient and pt will order lymphedema bandages - low stretch 2x8cm, 4x12 cm, 2x15 cm artiflex. 6/30 pt signed release of info for PT to send PT notes and insurance info to Madison at [facility] re possibility of getting lymphedema pump for B LE    Pt education:   9/22, 9/8 pt ed on approp skin care and reveiwed re approp bandaging  8/9 review HEP - see below d/w pt benefit of rewrapping daily, how to void constriction marks and gradually increasing walking duration/distance- wrote this on his note to RN  7/21 extensive pt ed re appropriate compression and compression gradient, need to avoid constriction    6/30   pt ed and provided with form on what compression wrappings to purchase. Ed on ankle pumps and toe flx/ext to stimulate lymphatic flow. Pt educated on pathology and anatomy of etiology of lymphedema, condition precautions, indications for long term prognosis. Pt was educated on diagnosis; prognosis; PT POC including MLD, compression (role of multilayer bandaging/ compression garments), lymphedema management/prevention of flare ups, role of exercise, HEP, lymphedema pump; expectations for rehab. All pt questions were answered. HEP instruction:  9/6 extra foam under wraps    8/9 increase duration of walks by 15-60 sec; goal - increase walk duration so he can do 1-2 laps at facility. Re-wrap daily as able- trial of grey foam at ankles    7/26   Access Code: [email or web address]  URL: [email or web address]  Date: 07/26/2022  Prepared by: Claudia Solorzano    Exercises  Long Sitting Quad Set - 3 x daily - 7 x weekly - 1 sets - 10 reps - 5 sec hold  Supine Knee Extension Stretch on Towel Roll - 3 x daily - 7 x weekly - 5-6 min hold    7/21 pt to order low stretch compression bandages: 2x8cm, 4x12 cm and artiflex 2x15 cm and bring to next appt. Encouraged pt to try to plan for a staff member to come to appt to be instructed in lymphedema wrapping and approp comrpession gradient    Eval: Pt instructed in lymphedema management/prevention concepts and swipe method of MLD, ankle pumps, toe DF/PF    Therapeutic Exercise and NMR EXR  [] (54425) Provided verbal/tactile cueing for activities related to strengthening, flexibility, endurance, ROM for improvements in  [] LE / Lumbar: LE, proximal hip, and core control with self care, mobility, lifting, ambulation. [] UE / Cervical: cervical, postural, scapular, scapulothoracic and UE control with self care, reaching, carrying, lifting, house/yardwork, driving, computer work.  [] (00776) Provided verbal/tactile cueing for activities related to improving balance, coordination, kinesthetic sense, posture, motor skill, proprioception to assist with   [] LE / lumbar: LE, proximal hip, and core control in self care, mobility, lifting, ambulation and eccentric single leg control. [] UE / cervical: cervical, scapular, scapulothoracic and UE control with self care, reaching, carrying, lifting, house/yardwork, driving, computer work.   [] (54701) Therapist is in constant attendance of 2 or more patients providing skilled therapy interventions, but not providing any significant amount of measurable one-on-one time to either patient, for improvements in  [] LE / lumbar: LE, proximal hip, and core control in self care, mobility, lifting, ambulation and eccentric single leg control. [] UE / cervical: cervical, scapular, scapulothoracic and UE control with self care, reaching, carrying, lifting, house/yardwork, driving, computer work.      NMR and Therapeutic Activities:    [x] (86341 or 10923) Provided verbal/tactile cueing for activities related to improving balance, coordination, kinesthetic sense, posture, motor skill, proprioception and motor activation to allow for proper function of   [x] LE: / Lumbar core, proximal hip and LE with self care and ADLs  [] UE / Cervical: cervical, postural, scapular, scapulothoracic and UE control with self care, carrying, lifting, driving, computer work.   [] (35981) Gait Re-education- Provided training and instruction to the patient for proper LE, core and proximal hip recruitment and positioning and eccentric body weight control with ambulation re-education including up and down stairs     Home Management Training / Self Care:  [] (56959) Provided self-care/home management training related to activities of daily living and compensatory training, and/or use of adaptive equipment for improvement with: ADLs and compensatory training, meal preparation, safety procedures and instruction in use of adaptive equipment, including bathing, grooming, dressing, personal hygiene, basic household cleaning and chores.      Home Exercise Program:    [] (69585) Reviewed/Progressed HEP activities related to strengthening, flexibility, endurance, ROM of   [] LE / Lumbar: core, proximal hip and LE for functional self-care, mobility, lifting and ambulation/stair navigation   [] UE / Cervical: cervical, postural, scapular, scapulothoracic and UE control with self care, reaching, carrying, lifting, house/yardwork, driving, computer work  [] (41813)Reviewed/Progressed HEP activities related to improving balance, coordination, kinesthetic sense, posture, motor skill, proprioception of   [] LE: core, proximal hip and LE for self care, mobility, lifting, and ambulation/stair navigation    [] UE / Cervical: cervical, postural,  scapular, scapulothoracic and UE control with self care, reaching, carrying, lifting, house/yardwork, driving, computer work    Manual Treatments:  PROM / STM / Oscillations-Mobs:  G-I, II, III, IV (PA's, Inf., Post.)  [x] (51311) Provided manual therapy to mobilize LE, proximal hip and/or LS spine soft tissue/joints for the purpose of modulating pain, promoting relaxation, increasing ROM, reducing/eliminating soft tissue swelling/inflammation/restriction, improving soft tissue extensibility and allowing for proper ROM for normal function with   [x] LE / lumbar: self care, mobility, lifting and ambulation. [] UE / Cervical: self care, reaching, carrying, lifting, house/yardwork, driving, computer work. Modalities:  [] (90897) Vasopneumatic compression: Utilized vasopneumatic compression to decrease edema / swelling for the purpose of improving mobility and quad tone / recruitment which will allow for increased overall function including but not limited to self-care, transfers, ambulation, and ascending / descending stairs. Charges:  Timed Code Treatment Minutes: 85   Total Treatment Minutes: 85     [] EVAL - LOW (71373)   [] EVAL - MOD (16849)  [] EVAL - HIGH (12211)  [] RE-EVAL (58334)  [] HD(48320) x   1    [] Ionto  [] NMR (01173) x       [] Vaso  [x] Manual (04638) x   2   [] Ultrasound  [x] TA x   3    [] Mech Traction (81669)  [] Aquatic Therapy x     [] ES (un) (59537):   [] Home Management Training x  [] ES(attended) (72271)   [] Dry Needling 1-2 muscles (39710):  [] Dry Needling 3+ muscles (768161  [] Group:      [x] Other: gaitx1    GOALS:  Patient stated goal: \"Pt wants to be able to stand with his walker and walk household distances\"  [x] Progressing: [] Met: [] Not Met: [] Adjusted     Therapist goals for Patient:   Short Term Goals: To be achieved in: 2 weeks  1. Independent in HEP and progression per patient tolerance, in order to prevent return of swelling   [] Progressing: [x] Met: [] Not Met: [] Adjusted  2. Patient will have a decrease in swelling/pain to facilitate improvement in movement, function, and ADLs as indicated by improvement with LLIS. [] Progressing: [x] Met: [] Not Met: [] Adjusted     Long Term Goals: To be achieved in: 6 weeks  1. FOTO score of at least 46 to assist with reaching prior level of function.   [] Progressing: [x] Met: [] Not Met: [] Adjusted  2. Patient will demonstrate increased AROM/strength of BLE to 4/5 so that pt can resume walking and standing without increase in symptoms. [x] Progressing: [] Met: [] Not Met: [] Adjusted  3. Decrease swelling of BLEs by 5cm so that pt can return to functional activities including standing, walking, squatting, and lifting without increased symptoms or restriction. [x] Progressing: [] Met: [] Not Met: [] Adjusted  4. Patient will be able to stand for >/= 5 minutes in order to improve standing tolerance for performing ADLs. [x] Progressing: [] Met: [] Not Met: [] Adjusted  5. Patient will be able to ambulate short community distances (300-500ft) with the LRAD to improve functional mobility in his home and community. [x] Progressing: [] Met: [] Not Met: [] Adjusted    Overall Progression Towards Functional goals/ Treatment Progress Update:  [x] Patient is progressing as expected towards functional goals listed. [] Progression is slowed due to complexities/Impairments listed. [] Progression has been slowed due to co-morbidities. [] Plan just implemented, too soon to assess goals progression <30days   [] Goals require adjustment due to lack of progress  [] Patient is not progressing as expected and requires additional follow up with physician  [] Other    Persisting Functional Limitations/Impairments:  []Sleeping []Sitting               [x]Standing [x]Transfers        [x]Walking [x]Kneeling               [x]Stairs [x]Squatting / bending   [x]ADLs []Reaching  [x]Lifting  [x]Housework  []Driving []Job related tasks  []Sports/Recreation []Other:        ASSESSMENT:  9/22 pt appears to have abdominal swelling. If this persists, he would benefit from advanced lymph pump to include abdomen  pt feels that staff at [facility] are trying and learning how to take care of his legs. Pt advocating for himself as best as he can and receptive to instruction to cont to do so.   Pt cont to benefit form compression , walking MLD. Will benefit greatly form lymph pump to use daily/most days    9/20 Pt with skin irritation around bandage on R dorsum of foot. Contacted staff at [facility] this date regarding appropriate skin care and bandaging. See above. Pt provided with note regarding distal to proximal compression and daily skin care. Continue with MLD, compression, and addition of home lymphedema pump to further decrease edema and fibrotic tissue. 9/15 New wound R ant shin: [id number] scab surrounded by [id number] red area. Increased pitting edema today - attribute this to staff at [facility] no longer wrapping his LE daily. Pt very frustrated by decreased wound and lymphedema care. Pt talking to head RN about it . Gave pt contact info for mara but advised pt to be careful how he approaches this   States he talked to [address] [place] [address] director of nursing on 9/13 regarding new wound care nurse - she told pt she would check into everything - daily wound care and daily wrapping. Pt reports he hasn't heard back from her.    9/13 Washed BLE and applied lotion as pt with dry skin on B lower legs and feet, R LE > L LE. Provided notes for nursing staff at pt's home to wash and moisturize legs daily and wrap prior to NV to allow PT to assess bandaging technique. Pt provided with phone number for [facility] medical to call regarding lymphedema pump trial as he has not been in contact with them recently. Continue with MLD, exercise, compression, and skin care to decrease edema and improve functional mobility. 9/8 pt needs assist with consistent and excellent skin care and daily bandaging. Staff turnover/changes at his facility has negatively impacted the health of his skin     9/6 swelling B LE much improved except significant swelling of feet and toes cont to be a problem R>L. Continues with wound R dorsal foot. Lymph pump should help prevent future wounds - pt reports hx of wounds on LE and infections since 2017. trial of  bandaging with extra foam today - to cover larger area at ankles and entire dorsum of foot - including dorsal toes    9/1 PN completed this date. Pt with significant decrease in edema in RLE this date with only 1 wound on dorsal ankle crease. Slight decreased edema in LLE. Pt with improved skin health with decreased skin dryness. Educated to continue with moisturizing, bandaging, and exercise to further decrease edema in B LE's and improve functional mobility. Treatment/Activity Tolerance:  [x] Patient able to complete tx [] Patient limited by fatigue  [] Patient limited by pain  [] Patient limited by other medical complications  [] Other:     Prognosis: [x] Good [] Fair  [] Poor    Patient Requires Follow-up: [x] Yes  [] No    Plan for next treatment session:   MLD, compression - bandage B LE below knee , HEP, pt education, lymph pump  B LE, lymph pump for home to help prevent chronic wounds R>L LE, exercise to stimulate lymphatic flow, LE ex, balance, gait, fxnl mobility    PLAN: See eval. PT 2x / week for 6 weeks. + 2x/wk for 6 wks   [x] Continue per plan of care [] Alter current plan (see comments)  [] Plan of care initiated [] Hold pending MD visit [] Discharge    Electronically signed by: Richard Renner, PT, DPT      Note: If patient does not return for scheduled/ recommended follow up visits, this note will serve as a discharge from care along with most recent update on progress.

## 2022-09-27 ENCOUNTER — HOSPITAL ENCOUNTER (OUTPATIENT)
Dept: PHYSICAL THERAPY | Age: 77
Setting detail: THERAPIES SERIES
Discharge: HOME | End: 2022-09-27
Payer: COMMERCIAL

## 2022-09-27 PROCEDURE — 97530 THERAPEUTIC ACTIVITIES: CPT

## 2022-09-27 PROCEDURE — 97140 MANUAL THERAPY 1/> REGIONS: CPT

## 2022-09-27 NOTE — FLOWSHEET NOTE
[address] [facility] Therapy  Phone: [phone]   Fax: [phone]    Physical Therapy Daily LYMPHEDEMA Treatment Note    Date:  2022    Patient Name:  Codie Foreman    :  1945  MRN: [id number]  Restrictions/Precautions:    Medical/Treatment Diagnosis Information:  Diagnosis: BILATERAL LOWER EXTREMITY LYMPHEDEMA  Treatment Diagnosis: B LE chronic swelling and wounds R > L and decreased B LE strength, decreased functional mobilityPT diagnosis:  Insurance/Certification information:  PT Insurance Information: [address] [place] [address] - no auth/no copay/BMN  Physician Information:  Rosemarie Dickerson NP    Plan of care signed (Y/N): []  Yes [x]  No     Date of Patient follow up with Physician:       Progress Report: []  Yes  [x]  No     Date Range for reporting period:  Beginnin22  PT POC 22 - faxed for signature  PN   PN   Ending    Progress report due (10 Rx/or 30 days whichever is less): visit #10 or      Recertification due (POC duration/ or 90 days whichever is less): 10/21/22    Visit # Insurance Allowable Auth required? Date Range    +  BMN []  Yes  [x]  No pcy        Latex Allergy:  [x]NO      []YES  Preferred Language for Healthcare:   [x]English       []other:      Functional Scale:                                                                                                      Date assessed:  FOTO physical FS primary measure score = 39; risk adjusted = 47                  22  FOTO physical FS primary measure score = 46                 22  FOTO physical FS primary measure score = 47                 22    Pain level:  0/10  B LE    SUBJECTIVE:  Pt reports he is doing well. Didn't get legs re-bandaged this morning due to time. Is getting legs moisturized daily but not washed.     OBJECTIVE:       GIRTH MEASUREMENT  (Tape on skin along anterior tibialis)     Lower Extremity Right (cm) Left  (cm) Right (cm) Left (cm) Right (cm) Left (cm)    Date 6/30/22 6/30/22 8/2 8/2 9/1 9/1 9/22   Measurements taken as follows: 0 cm at inf aspect of lateral malleolus and marked on    [] Ant aspect of LE    [x] Lateral aspect of LE    [] sheet                         cm  Widest at hip            cm at waist (at umbilicus), measured while reclined on hospital bed 132.0 cm       134.5 cm on exhale  135.0 cm on inhale    While reclined on hospital bed                Metatarsal heads 25.7cm 25.7cm 26.1 25.5 25.0 25.5    0 Cm above inf aspect of  LATERAL MALLEOLUS 37.8 35.5 cm 34.9 32.5 35.2 33.9     10 Cm above inf aspect of  LATERAL MALLEOLUS    38.7 33.4cm 39.1 30.6 31.9 32.0    20 Cm above  inf aspec of LATERAL MALLEOLUS 45.3 38.5 44.4 39.8 34.8 39.0    30 Cm above  inf aspect of  LATERAL MALLEOLUS 46.4 41.4 45.5 46.8 39.7 42.0                 Total Girth 193.9 174.5 190.0 175.2 166.6  172.4        9/20 skin irritation on dorsum of R foot around border of wound care bandage    9/15 pt to clinic without wraps on - states he removed them yesterday bc they were falling off and staff did not come when requested to put back on. Increased pitting edema today. New wound R ant shin: [id number] scab surrounded by [id number] red area. States he talked to Lillie director of nursing on 9/13 regarding new wound care nurse - she told pt she would check into everything - daily wound care and daily wrapping. Pt reports he hasn't heard back from her.    9/8 pt's LEs B  not washed since Kaley PT washed them on 9/1 during PT session. Wound dorsal R foot: 1.3cmx 1.8cm. red-tiarra Jeneen  drainage; red rectangle of skin near/around wound   9/6 Pt reports hx of wounds on LE and infections since 201    9/1  LLE total girth decreased by 2.8 cm compared to 8/2  RLE total girth decreased by 23.4 cm compared to 8/2 8/30 small opening on R foot. Decreased edema B lower legs with increased edema at knees.      8/16/22: observe:    L LE: increased swelling at dorsum of foot and toes. Decreased swelling ankle to below knee. Foam rectangle at ankle reduces constriction  at ankle  R LE: increased swelling, increased redness and increased warmth compared to L LE. Concern re possible cellulitis. PT called RN at pt's living facility - see below  Deferred CKC ex due to possible cellulitis R LE      8/9 overall reduction in swelling and wounds healing. Distal swelling present at R foot and toes - needs improved compression gradient with bandages  NEW Blister opened R ant/lat shin: 1.3cm x 0.9cm. new red area R dorsal foot: 2\" x 1.5\" (Not open). PT cleaned opened blister area with alcohol wipe and covered with tegaderm - communicated to pt's RN on communication form    8/2    RLE decreased total girth by 3.9 cm   LLE increased total girth by 0.7 cm     7/21   Pt to clinic wearing 2 tensoshape each LE from mid foot to below knee. Signs of constriction present B LE ant ankle and R LE below knee - inappropriate compression gradient. Dorsal foot swelling increased B - due to constriction at ankles from rolled and doubled tensoshapes  causing constriction  multiple new wounds present B LEs:  L medial calf: 1.5 x 0.8cm, red area L ant ankle: 2.7cnx2.0 cm; R ant shin: 1.7cmx1.7cm, R ant ankle: not yet open but very red: 3.5cmx2.5 with black area in center of red area: 1.0cm x 0.9cm  Redness present B dorsal feet and R LE below knee. Increased warmth R LE compared to L. Concern for possible cellulitis.      RESTRICTIONS/PRECAUTIONS:     Exercises/Interventions:     Therapeutic Exercises (08879) Resistance / level Sets/sec Reps Notes   Nu step See below      Pt educated on exercises to stimulate lymphatic flow        CKC LE ex at // bars or counter       Ankle pumps  Toe DF/PF  SAQ  Gastroc stretch with active DF   T/o session                 Therapeutic Activities (25411)  (Dynamic activities such as compression, designed to improve functional performance)       Transfers: sit to/from stand from  and hospital bed   VC for knee, hip, and trunk ext    Applied dressing to wound: PT cleaned area with alcohol wipe and covered with bandage      Compression: trial tensoshape size   applied to B LE - XL   7/15 Educated to follow up with wound care nurse when he returns home due to weeping and need for dressing change. Pt states he is on oral antibiotic      Multilayer compression bandaging to BLE  Stockinette    Horseshoe at B ankle  Large Foam rectangle at dorsum of B feet and dorsal toes   - held this date due to wounds    2x12 cm low stretch compression bandage    7/29 unwrapped lower extremities. Discussed increased compression at foot and wrapping distal to proximal. Wrote notes for pt's nurse. 8/5, 8/2 unwrapped. Educated to increase compression proximally   9/20, 8/26 unwrapped LE's - educated to increase compression at feet and ankles and to wrap to just under knee. Notes written for pt's nurse     PN completed 9/1 - objective measures taken. Discussed progress made with therapy. Educated to continue with bandaging, skin care, and ambulation/HEP       X 20 min total            7/26 educated pt and Arelis (wound care nurse) on bandaging    Ambulation  95 ft + 55 ft with RW and CGA/SBA    9/8 deferred due to time constrains - assisted pt to wash B LE and applied lotion    9/6 gait limited by fatigue 7/21 pt SOB after   HEP   - LLD knee ext stretch   - quad set   - ankle pump  - LAQ      Transfer w/c <> bed - SBA  Transfer sit to/form stand at   Transfer sit <> supine X 1  X 1   VC for knee ext    Nu step   S=11, arms =10, workload =3 5 min   8/16 deferred due to possible cellulitis    Wearing multilayer bandaging   Rest break     9/27, 9/229/8, 8/30 Unwrapped B LE's. Educated on bandaging up to knee crease. Washed B lower legs with gentle soap and applied lotion. Educated on importance of skin health and keeping legs clean and moisturized.     9/15, 9/13 Washed B lower legs with gentle soap and applied lotion. Educated on importance of skin health and keeping legs clean and moisturized. Wrote notes for Pt's nurse. Provided with phone number for [facility] medical to obtain home lymphedema pump as he has not been in contact with them recently 25 min                          9/13 education partially completed during MLD    Home Management  (providing pt education on safety procedures/instructions)       Pt educated on compression as follows:   how to appropriately apply and wear compression  how to maintain appropriate gradient compression  do not sleep with compression garment/tensoshape  signs and symptoms of constriction   when to remove compression       Pt educated on lymphedema prevention and management    7/12 completed during MLD    Pt educated on MLD                            Neuromuscular Re-ed (14607)       balance                     Manual Intervention (04450)      See MLD flowchart below   X 40 min   7/21 deferred due to concern re possible cellulitis                                        Manual Lymph Drainage (MLD):  MLD to B LE , clearing along alternate pathway of  Ipsilateral trunk  to  Ipsilateral axillary  lymph nodes    Clear Nodes 10x each   Neck x   [address]    Axilla x   Abdomen x   Groin x   Popliteal x2 x   Clear Alternate Pathway 10x each   Re-clear alternate pathway   x5 each position x   Location Ipsilateral trunk       Fluid Mobilization 10x each   Re-clear alternate pathway   x5 each position x   Shoulder bracing    Location B LE and ipsilateral trunk       Protein Resorption 10x each   Location LE below knee       Clear Foot/Ankle or Hand 10x each   Achilles x   Bilateral malleolus x   Fan the cards x   Clear dorsum x   Clear through web space x   Clear toes/fingers x   Fluid mobilization x   Re-clear all positions  X5 each x       Modalities:     OTHER:     9/22 pt ed re how to advocate for himself for LE washing and bandaging - pt agreeable.   D/w pt PT will call khai's juwan during next session if needed. 9/20 Corey Regan, PT, DPT contacted [facility] and spoke with Lawosn Valdez RN regarding need for compression daily when lower extremities are in a dependent position. Phone call transferred to Shanelle Ingram, PT, DPT who spoke with wound care nurse, Zay Trammell regarding pt's compression. Discussed need for skin care and bandaging daily with gradient compression wrapping from distal to proximal from toes to just below knee. Zay Trammell states this is the current POC. PT provided pt with note requesting daily skin care and compression distal to proximal.      9/15 gave pt written note requesting daily wound care and daily bandage changes. Advised we are happy to teach facility staff how to wrap. Advised pt to contact [facility] to schedule pump demo. Advised pt to look into should he request (and pay for ) additional 1-2 units of ADLs assist a day so staff can/will wrap his legs? Pt is agreeable    9/8 gave pt handout for RN at [facility] requesting pt get daily assist with lower body bathing, application of lotion to LE and bandaging of LE    9/6 gave pt handout with written bandaging directions fr new RN at facility    8/16/22 1121am PT called Joselyn Benitez director of nursing next at [phone] re pt- discussed concern re possible new onset cellulitis R  LE. She will call pt;'s MD re possible start of antibiotics for cellulitis. 7/21 pt signed release of info form for PT to communicate with medical staff at [facility] (the senior community where he [facility])     7/21 PT called Thierry Driscoll NP  re pt- discussed concern re possible new onset cellulitis R and or L LE, new onset wounds, inappropriate compression gradient with 2 tensoshapes that rolled. Anam Mccallum  states she will call in antibiotic. At pt request, PT called SHANIA Stephens [phone]; unable to LM due to VM full. Called Lisa director of nursing next at [phone] - no answer, no VM.      PT wrote note to RNs a pt's independent living facility re need for approp compression gradient and pt will order lymphedema bandages - low stretch 2x8cm, 4x12 cm, 2x15 cm artiflex. 6/30 pt signed release of info for PT to send PT notes and insurance info to [facility] re possibility of getting lymphedema pump for B LE    Pt education:   9/22, 9/8 pt ed on approp skin care and reveiwed re approp bandaging  8/9 review HEP - see below d/w pt benefit of rewrapping daily, how to void constriction marks and gradually increasing walking duration/distance- wrote this on his note to RN  7/21 extensive pt ed re appropriate compression and compression gradient, need to avoid constriction    6/30   pt ed and provided with form on what compression wrappings to purchase. Ed on ankle pumps and toe flx/ext to stimulate lymphatic flow. Pt educated on pathology and anatomy of etiology of lymphedema, condition precautions, indications for long term prognosis. Pt was educated on diagnosis; prognosis; PT POC including MLD, compression (role of multilayer bandaging/ compression garments), lymphedema management/prevention of flare ups, role of exercise, HEP, lymphedema pump; expectations for rehab. All pt questions were answered. HEP instruction:  9/6 extra foam under wraps    8/9 increase duration of walks by 15-60 sec; goal - increase walk duration so he can do 1-2 laps at facility. Re-wrap daily as able- trial of grey foam at ankles    7/26   Access Code: [email or web address]  URL: [email or web address]  Date: 07/26/2022  Prepared by: Kalli Mccoy    Exercises  Long Sitting Quad Set - 3 x daily - 7 x weekly - 1 sets - 10 reps - 5 sec hold  Supine Knee Extension Stretch on Towel Roll - 3 x daily - 7 x weekly - 5-6 min hold    7/21 pt to order low stretch compression bandages: 2x8cm, 4x12 cm and artiflex 2x15 cm and bring to next appt.  Encouraged pt to try to plan for a staff member to come to appt to be instructed in lymphedema wrapping and approp comrpession gradient    Eval: Pt instructed in lymphedema management/prevention concepts and swipe method of MLD, ankle pumps, toe DF/PF    Therapeutic Exercise and NMR EXR  [] (72626) Provided verbal/tactile cueing for activities related to strengthening, flexibility, endurance, ROM for improvements in  [] LE / Lumbar: LE, proximal hip, and core control with self care, mobility, lifting, ambulation. [] UE / Cervical: cervical, postural, scapular, scapulothoracic and UE control with self care, reaching, carrying, lifting, house/yardwork, driving, computer work.  [] (80116) Provided verbal/tactile cueing for activities related to improving balance, coordination, kinesthetic sense, posture, motor skill, proprioception to assist with   [] LE / lumbar: LE, proximal hip, and core control in self care, mobility, lifting, ambulation and eccentric single leg control. [] UE / cervical: cervical, scapular, scapulothoracic and UE control with self care, reaching, carrying, lifting, house/yardwork, driving, computer work.   [] (06848) Therapist is in constant attendance of 2 or more patients providing skilled therapy interventions, but not providing any significant amount of measurable one-on-one time to either patient, for improvements in  [] LE / lumbar: LE, proximal hip, and core control in self care, mobility, lifting, ambulation and eccentric single leg control. [] UE / cervical: cervical, scapular, scapulothoracic and UE control with self care, reaching, carrying, lifting, house/yardwork, driving, computer work.      NMR and Therapeutic Activities:    [x] (86994 or 85043) Provided verbal/tactile cueing for activities related to improving balance, coordination, kinesthetic sense, posture, motor skill, proprioception and motor activation to allow for proper function of   [x] LE: / Lumbar core, proximal hip and LE with self care and ADLs  [] UE / Cervical: cervical, postural, scapular, scapulothoracic and UE control with self care, carrying, lifting, driving, computer work.   [] (70291) Gait Re-education- Provided training and instruction to the patient for proper LE, core and proximal hip recruitment and positioning and eccentric body weight control with ambulation re-education including up and down stairs     Home Management Training / Self Care:  [] (39844) Provided self-care/home management training related to activities of daily living and compensatory training, and/or use of adaptive equipment for improvement with: ADLs and compensatory training, meal preparation, safety procedures and instruction in use of adaptive equipment, including bathing, grooming, dressing, personal hygiene, basic household cleaning and chores.      Home Exercise Program:    [] (92273) Reviewed/Progressed HEP activities related to strengthening, flexibility, endurance, ROM of   [] LE / Lumbar: core, proximal hip and LE for functional self-care, mobility, lifting and ambulation/stair navigation   [] UE / Cervical: cervical, postural, scapular, scapulothoracic and UE control with self care, reaching, carrying, lifting, house/yardwork, driving, computer work  [] (54399)Reviewed/Progressed HEP activities related to improving balance, coordination, kinesthetic sense, posture, motor skill, proprioception of   [] LE: core, proximal hip and LE for self care, mobility, lifting, and ambulation/stair navigation    [] UE / Cervical: cervical, postural,  scapular, scapulothoracic and UE control with self care, reaching, carrying, lifting, house/yardwork, driving, computer work    Manual Treatments:  PROM / STM / Oscillations-Mobs:  G-I, II, III, IV (PA's, Inf., Post.)  [x] (91769) Provided manual therapy to mobilize LE, proximal hip and/or LS spine soft tissue/joints for the purpose of modulating pain, promoting relaxation,  increasing ROM, reducing/eliminating soft tissue swelling/inflammation/restriction, improving soft tissue walking and standing without increase in symptoms. [x] Progressing: [] Met: [] Not Met: [] Adjusted  3. Decrease swelling of BLEs by 5cm so that pt can return to functional activities including standing, walking, squatting, and lifting without increased symptoms or restriction. [x] Progressing: [] Met: [] Not Met: [] Adjusted  4. Patient will be able to stand for >/= 5 minutes in order to improve standing tolerance for performing ADLs. [x] Progressing: [] Met: [] Not Met: [] Adjusted  5. Patient will be able to ambulate short community distances (300-500ft) with the LRAD to improve functional mobility in his home and community. [x] Progressing: [] Met: [] Not Met: [] Adjusted    Overall Progression Towards Functional goals/ Treatment Progress Update:  [x] Patient is progressing as expected towards functional goals listed. [] Progression is slowed due to complexities/Impairments listed. [] Progression has been slowed due to co-morbidities. [] Plan just implemented, too soon to assess goals progression <30days   [] Goals require adjustment due to lack of progress  [] Patient is not progressing as expected and requires additional follow up with physician  [] Other    Persisting Functional Limitations/Impairments:  []Sleeping []Sitting               [x]Standing [x]Transfers        [x]Walking [x]Kneeling               [x]Stairs [x]Squatting / bending   [x]ADLs []Reaching  [x]Lifting  [x]Housework  []Driving []Job related tasks  []Sports/Recreation []Other:        ASSESSMENT:    9/27 Pt further educated on importance of stretching R knee into ext as he continues to lack full knee ext with amb. Completed skin care this date. Pt with decreased skin dryness and skin irritation compared to previous visit. Pt has been complaint with over 4 weeks of compression, elevation, and exercise, and edema in BLE and trunk remains.  Pt will benefit from advanced lymphedema pump due to truncal swelling and increased abdominal measurement following basic pump trial.     Per Zana Singh from [facility] lymphedema pump trial:    Patient trialed the basic pump for about 15 mins @ 30mmHg pressure on the right leg, below are the results:     Before/ after/ difference:   Abdomen: 134.0cm/ 134.1cm/ +0.1cm   Thigh: 60.0cm/ 60.0cm/ +/- 0cm  Knee: 54.1cm/ 53.8cm/ -0.30cm  Calf: 41.9cm/ 41.9cm/ +/- 0cm  Ankle: 38.7cm/ 38.6cm/ -0.1cm     Patient then trialed the [address] on the right leg and abdomen at normal pressure for about 15 minutes, below are the results:     Before/ after/ difference:  Abdomen: 134.1cm/ 133.7cm/ -0.4cm  Thigh: 60.0 cm/ 59.1cm/ -0.9cm  Knee: 53.8cm/ 53.7cm/ -0.1cm  Calf: 41.9cm/ 40.8cm/ -1.1cm   Ankle: 38.6cm/ 38.3cm/ -0.3cm    9/22 pt appears to have abdominal swelling. If this persists, he would benefit from advanced lymph pump to include abdomen  pt feels that staff at [facility] are trying and learning how to take care of his legs. Pt advocating for himself as best as he can and receptive to instruction to cont to do so. Pt cont to benefit form compression , walking MLD. Will benefit greatly form lymph pump to use daily/most days    9/20 Pt with skin irritation around bandage on R dorsum of foot. Contacted staff at [facility] this date regarding appropriate skin care and bandaging. See above. Pt provided with note regarding distal to proximal compression and daily skin care. Continue with MLD, compression, and addition of home lymphedema pump to further decrease edema and fibrotic tissue. 9/15 New wound R ant shin: [id number] scab surrounded by [id number] red area. Increased pitting edema today - attribute this to staff at [facility] no longer wrapping his LE daily. Pt very frustrated by decreased wound and lymphedema care. Pt talking to head RN about it .   Gave pt contact info for mara but advised pt to be careful how he approaches this   States he talked to [facility] director of nursing on 9/13 regarding new wound care nurse - she told pt she would check into everything - daily wound care and daily wrapping. Pt reports he hasn't heard back from her.    9/13 Washed BLE and applied lotion as pt with dry skin on B lower legs and feet, R LE > L LE. Provided notes for nursing staff at pt's home to wash and moisturize legs daily and wrap prior to NV to allow PT to assess bandaging technique. Pt provided with phone number for [facility] medical to call regarding lymphedema pump trial as he has not been in contact with them recently. Continue with MLD, exercise, compression, and skin care to decrease edema and improve functional mobility. 9/8 pt needs assist with consistent and excellent skin care and daily bandaging. Staff turnover/changes at his facility has negatively impacted the health of his skin     9/6 swelling B LE much improved except significant swelling of feet and toes cont to be a problem R>L. Continues with wound R dorsal foot. Lymph pump should help prevent future wounds - pt reports hx of wounds on LE and infections since 2017. trial of  bandaging with extra foam today - to cover larger area at ankles and entire dorsum of foot - including dorsal toes    9/1 PN completed this date. Pt with significant decrease in edema in RLE this date with only 1 wound on dorsal ankle crease. Slight decreased edema in LLE. Pt with improved skin health with decreased skin dryness. Educated to continue with moisturizing, bandaging, and exercise to further decrease edema in B LE's and improve functional mobility.      Treatment/Activity Tolerance:  [x] Patient able to complete tx [] Patient limited by fatigue  [] Patient limited by pain  [] Patient limited by other medical complications  [] Other:     Prognosis: [x] Good [] Fair  [] Poor    Patient Requires Follow-up: [x] Yes  [] No    Plan for next treatment session:   MLD, compression - bandage B LE below knee , HEP, pt education, lymph pump  B LE, lymph pump for

## 2022-09-29 ENCOUNTER — HOSPITAL ENCOUNTER (OUTPATIENT)
Dept: PHYSICAL THERAPY | Age: 77
Setting detail: THERAPIES SERIES
Discharge: HOME | End: 2022-09-29
Payer: COMMERCIAL

## 2022-09-29 PROCEDURE — 97530 THERAPEUTIC ACTIVITIES: CPT

## 2022-09-29 PROCEDURE — 97140 MANUAL THERAPY 1/> REGIONS: CPT

## 2022-09-29 PROCEDURE — 97116 GAIT TRAINING THERAPY: CPT

## 2022-09-29 NOTE — FLOWSHEET NOTE
[address] [facility] Physical Therapy  Phone: [phone]   Fax: [phone]    Physical Therapy Daily LYMPHEDEMA Treatment Note    Date:  2022    Patient Name:  Wilda Arcos   \"JANELLE\"  :  1945  MRN: [id number]  Restrictions/Precautions:    Medical/Treatment Diagnosis Information:  Diagnosis: BILATERAL LOWER EXTREMITY LYMPHEDEMA  Treatment Diagnosis: B LE chronic swelling and wounds R > L and decreased B LE strength, decreased functional mobilityPT diagnosis:  Insurance/Certification information:  PT Insurance Information:  [facility] - no auth/no copay/BMN  Physician Information:  Tashia Mcnamara NP    Plan of care signed (Y/N): []  Yes [x]  No     Date of Patient follow up with Physician:       Progress Report: []  Yes  [x]  No     Date Range for reporting period:  Beginnin22  PT POC 22 - faxed for signature  PN   PN   Ending    Progress report due (10 Rx/or 30 days whichever is less): visit #10 or      Recertification due (POC duration/ or 90 days whichever is less): 10/21/22    Visit # Insurance Allowable Auth required? Date Range    + 10/12 BMN []  Yes  [x]  No pcy        Latex Allergy:  [x]NO      []YES  Preferred Language for Healthcare:   [x]English       []other:      Functional Scale:                                                                                                      Date assessed:  FOTO physical FS primary measure score = 39; risk adjusted = 47                  22  FOTO physical FS primary measure score = 46                 22  FOTO physical FS primary measure score = 47                 22    Pain level:  0/10  B LE    SUBJECTIVE:  Pt reports he is doing well. Reports his legs were wrapped yesterday. Reports his legs haven't been washed since last PT tx.  Reports when the wound care RN unwraps his leg to look at the wound, she re-wraps and re-tapes with the old tape - which 8/2 8/30 small opening on R foot. Decreased edema B lower legs with increased edema at knees. 8/16/22: observe:    L LE: increased swelling at dorsum of foot and toes. Decreased swelling ankle to below knee. Foam rectangle at ankle reduces constriction  at ankle  R LE: increased swelling, increased redness and increased warmth compared to L LE. Concern re possible cellulitis. PT called RN at pt's living facility - see below  Deferred CKC ex due to possible cellulitis R LE      8/9 overall reduction in swelling and wounds healing. Distal swelling present at R foot and toes - needs improved compression gradient with bandages  NEW Blister opened R ant/lat shin: 1.3cm x 0.9cm. new red area R dorsal foot: 2\" x 1.5\" (Not open). PT cleaned opened blister area with alcohol wipe and covered with tegaderm - communicated to pt's RN on communication form    8/2    RLE decreased total girth by 3.9 cm   LLE increased total girth by 0.7 cm     7/21   Pt to clinic wearing 2 tensoshape each LE from mid foot to below knee. Signs of constriction present B LE ant ankle and R LE below knee - inappropriate compression gradient. Dorsal foot swelling increased B - due to constriction at ankles from rolled and doubled tensoshapes  causing constriction  multiple new wounds present B LEs:  L medial calf: 1.5 x 0.8cm, red area L ant ankle: 2.7cnx2.0 cm; R ant shin: 1.7cmx1.7cm, R ant ankle: not yet open but very red: 3.5cmx2.5 with black area in center of red area: 1.0cm x 0.9cm  Redness present B dorsal feet and R LE below knee. Increased warmth R LE compared to L. Concern for possible cellulitis.      RESTRICTIONS/PRECAUTIONS:     Exercises/Interventions:     Therapeutic Exercises (23957) Resistance / level Sets/sec Reps Notes   Nu step See below      Pt educated on exercises to stimulate lymphatic flow        CKC LE ex at // bars or counter       Ankle pumps  Toe DF/PF  SAQ  Gastroc stretch with active DF   T/o session Therapeutic Activities (48927)  (Dynamic activities such as compression, designed to improve functional performance)       Transfers:  sit to/from stand from  and hospital bed   VC for knee, hip, and trunk ext    Applied dressing to wound: PT cleaned area with alcohol wipe and covered with bandage      Compression: trial tensoshape size   applied to B LE - XL   7/15 Educated to follow up with wound care nurse when he returns home due to weeping and need for dressing change. Pt states he is on oral antibiotic      Multilayer compression bandaging to BLE  Stockinette    Horseshoe at B ankle  Large Foam rectangle at dorsum of B feet and dorsal toes   - held this date due to wounds    2x12 cm low stretch compression bandage    7/29 unwrapped lower extremities. Discussed increased compression at foot and wrapping distal to proximal. Wrote notes for pt's nurse. 8/5, 8/2 unwrapped. Educated to increase compression proximally   9/20, 8/26 unwrapped LE's - educated to increase compression at feet and ankles and to wrap to just under knee. Notes written for pt's nurse     PN completed 9/1 - objective measures taken. Discussed progress made with therapy. Educated to continue with bandaging, skin care, and ambulation/HEP       X 20 min total            7/26 educated pt and Arelis (wound care nurse) on bandaging    Ambulation  150 ft with RW and CGA/SBA    9/8 deferred due to time constrains - assisted pt to wash B LE and applied lotion    9/6 gait limited by fatigue 7/21 pt SOB after   HEP   - LLD knee ext stretch   - quad set   - ankle pump  - LAQ      Transfer w/c <> bed - SBA  Transfer sit to/form stand at   Transfer sit <> supine X 1  X 1   VC for knee ext    Nu step   S=11, arms =10, workload =3 5 min   8/16 deferred due to possible cellulitis    Wearing multilayer bandaging   Rest break     9/29, 9/27, 9/229/8, 8/30 Unwrapped B LE's. Educated on bandaging up to knee crease.   Washed B lower legs with gentle soap and applied lotion. Educated on importance of skin health and keeping legs clean and moisturized. 9/15, 9/13 Washed B lower legs with gentle soap and applied lotion. Educated on importance of skin health and keeping legs clean and moisturized. Wrote notes for Pt's nurse.  Provided with phone number for [employer] to obtain home lymphedema pump as he has not been in contact with them recently 25 min                          9/13 education partially completed during MLD    Home Management  (providing pt education on safety procedures/instructions)       Pt educated on compression as follows:   how to appropriately apply and wear compression  how to maintain appropriate gradient compression  do not sleep with compression garment/tensoshape  signs and symptoms of constriction   when to remove compression       Pt educated on lymphedema prevention and management    7/12 completed during MLD    Pt educated on MLD                            Neuromuscular Re-ed (87713)       balance                     Manual Intervention (07556)      See MLD flowchart below   X 30 min   7/21 deferred due to concern re possible cellulitis                                        Manual Lymph Drainage (MLD):  MLD to B LE , clearing along alternate pathway of  Ipsilateral trunk  to  Ipsilateral axillary  lymph nodes    Clear Nodes 10x each   Neck x   [address]    Axilla x   Abdomen x   Groin x   Popliteal x2 x   Clear Alternate Pathway 10x each   Re-clear alternate pathway   x5 each position x   Location Ipsilateral trunk       Fluid Mobilization 10x each   Re-clear alternate pathway   x5 each position x   Shoulder bracing    Location B LE and ipsilateral trunk       Protein Resorption 10x each   Location LE below knee       Clear Foot/Ankle or Hand 10x each   Achilles x   Bilateral malleolus x   Fan the cards x   Clear dorsum x   Clear through web space x   Clear toes/fingers x   Fluid mobilization x   Re-clear all positions  X5 each x       Modalities:     OTHER:     9/29 PT sent another note to pt's facility re please re-wrap, lotion and wash LE daily. ,  asked staff to use new tape when re-wrapping.       9/22 pt ed re how to advocate for himself for LE washing and bandaging - pt agreeable. D/w pt PT will [facility] during next session if needed. 9/20 Monty Angelucci, PT, DPT contacted [facility] and spoke with Todd Eldridge, RN regarding need for compression daily when lower extremities are in a dependent position. Phone call transferred to Fito Hawk, PT, DPT who spoke with wound care nurse, Henry Serrano regarding pt's compression. Discussed need for skin care and bandaging daily with gradient compression wrapping from distal to proximal from toes to just below knee. Henry Serrano states this is the current POC. PT provided pt with note requesting daily skin care and compression distal to proximal.      9/15 gave pt written note requesting daily wound care and daily bandage changes. Advised we are happy to teach facility staff how to wrap. Advised pt to contact [facility] to schedule pump demo. Advised pt to look into should he request (and pay for ) additional 1-2 units of ADLs assist a day so staff can/will wrap his legs? Pt is agreeable    9/8 gave pt handout for RN at facility requesting pt get daily assist with lower body bathing, application of lotion to LE and bandaging of LE    9/6 gave pt handout with written bandaging directions fr new RN at facility    8/16/22 1121am PT called [facility] director of nursing next at [phone] re pt- discussed concern re possible new onset cellulitis R  LE. She will call pt;'s MD re possible start of antibiotics for cellulitis.     7/21 pt signed release of info form for PT to communicate with medical staff at [facility] ([facility])     7/21 PT called Klaudia Zapien NP  re pt- discussed concern re possible new onset cellulitis R and or L LE, new onset wounds, inappropriate compression gradient with 2 tensoshapes that rolled. Yoan Juares  states she will call in antibiotic. At pt request, PT called RN Eb Molina [phone]; unable to LM due to VM full. [facility] director of nursing next at [phone] - no answer, no VM. PT wrote note to RNs a pt's independent living facility re need for approp compression gradient and pt will order lymphedema bandages - low stretch 2x8cm, 4x12 cm, 2x15 cm artiflex. 6/30 pt signed release of info for PT to send PT notes and insurance info to [facility] re possibility of getting lymphedema pump for B LE    Pt education:   9/22, 9/8 pt ed on approp skin care and reveiwed re approp bandaging  8/9 review HEP - see below d/w pt benefit of rewrapping daily, how to void constriction marks and gradually increasing walking duration/distance- wrote this on his note to RN  7/21 extensive pt ed re appropriate compression and compression gradient, need to avoid constriction    6/30   pt ed and provided with form on what compression wrappings to purchase. Ed on ankle pumps and toe flx/ext to stimulate lymphatic flow. Pt educated on pathology and anatomy of etiology of lymphedema, condition precautions, indications for long term prognosis. Pt was educated on diagnosis; prognosis; PT POC including MLD, compression (role of multilayer bandaging/ compression garments), lymphedema management/prevention of flare ups, role of exercise, HEP, lymphedema pump; expectations for rehab. All pt questions were answered. HEP instruction:  9/6 extra foam under wraps    8/9 increase duration of walks by 15-60 sec; goal - increase walk duration so he can do 1-2 laps at facility. Re-wrap daily as able- trial of grey foam at ankles    7/26   Access Code: [email or web address]  URL: [email or web address]  Date: 07/26/2022  Prepared by: Brea Krueger    Exercises  Long Sitting Quad Set - 3 x daily - 7 x weekly - 1 sets - 10 reps - 5 sec hold  Supine Knee Extension Stretch on Towel Roll - 3 x daily - 7 x weekly - 5-6 min hold    7/21 pt to order low stretch compression bandages: 2x8cm, 4x12 cm and artiflex 2x15 cm and bring to next appt. Encouraged pt to try to plan for a staff member to come to appt to be instructed in lymphedema wrapping and approp comrpession gradient    Eval: Pt instructed in lymphedema management/prevention concepts and swipe method of MLD, ankle pumps, toe DF/PF    Therapeutic Exercise and NMR EXR  [] (10356) Provided verbal/tactile cueing for activities related to strengthening, flexibility, endurance, ROM for improvements in  [] LE / Lumbar: LE, proximal hip, and core control with self care, mobility, lifting, ambulation. [] UE / Cervical: cervical, postural, scapular, scapulothoracic and UE control with self care, reaching, carrying, lifting, house/yardwork, driving, computer work.  [] (23349) Provided verbal/tactile cueing for activities related to improving balance, coordination, kinesthetic sense, posture, motor skill, proprioception to assist with   [] LE / lumbar: LE, proximal hip, and core control in self care, mobility, lifting, ambulation and eccentric single leg control. [] UE / cervical: cervical, scapular, scapulothoracic and UE control with self care, reaching, carrying, lifting, house/yardwork, driving, computer work.   [] (00326) Therapist is in constant attendance of 2 or more patients providing skilled therapy interventions, but not providing any significant amount of measurable one-on-one time to either patient, for improvements in  [] LE / lumbar: LE, proximal hip, and core control in self care, mobility, lifting, ambulation and eccentric single leg control. [] UE / cervical: cervical, scapular, scapulothoracic and UE control with self care, reaching, carrying, lifting, house/yardwork, driving, computer work.      NMR and Therapeutic Activities:    [x] (82262 or 33291) Provided verbal/tactile cueing for activities related to improving balance, coordination, kinesthetic sense, posture, motor skill, proprioception and motor activation to allow for proper function of   [x] LE: / Lumbar core, proximal hip and LE with self care and ADLs  [] UE / Cervical: cervical, postural, scapular, scapulothoracic and UE control with self care, carrying, lifting, driving, computer work.   [] (07078) Gait Re-education- Provided training and instruction to the patient for proper LE, core and proximal hip recruitment and positioning and eccentric body weight control with ambulation re-education including up and down stairs     Home Management Training / Self Care:  [] (38346) Provided self-care/home management training related to activities of daily living and compensatory training, and/or use of adaptive equipment for improvement with: ADLs and compensatory training, meal preparation, safety procedures and instruction in use of adaptive equipment, including bathing, grooming, dressing, personal hygiene, basic household cleaning and chores.      Home Exercise Program:    [] (83606) Reviewed/Progressed HEP activities related to strengthening, flexibility, endurance, ROM of   [] LE / Lumbar: core, proximal hip and LE for functional self-care, mobility, lifting and ambulation/stair navigation   [] UE / Cervical: cervical, postural, scapular, scapulothoracic and UE control with self care, reaching, carrying, lifting, house/yardwork, driving, computer work  [] (37480)Reviewed/Progressed HEP activities related to improving balance, coordination, kinesthetic sense, posture, motor skill, proprioception of   [] LE: core, proximal hip and LE for self care, mobility, lifting, and ambulation/stair navigation    [] UE / Cervical: cervical, postural,  scapular, scapulothoracic and UE control with self care, reaching, carrying, lifting, house/yardwork, driving, computer work    Manual Treatments:  PROM / STM / Oscillations-Mobs:  G-I, II, III, IV (PA's, Inf., Post.)  [x] (23855) Provided manual therapy to mobilize LE, proximal hip and/or LS spine soft tissue/joints for the purpose of modulating pain, promoting relaxation,  increasing ROM, reducing/eliminating soft tissue swelling/inflammation/restriction, improving soft tissue extensibility and allowing for proper ROM for normal function with   [x] LE / lumbar: self care, mobility, lifting and ambulation. [] UE / Cervical: self care, reaching, carrying, lifting, house/yardwork, driving, computer work. Modalities:  [] (28403) Vasopneumatic compression: Utilized vasopneumatic compression to decrease edema / swelling for the purpose of improving mobility and quad tone / recruitment which will allow for increased overall function including but not limited to self-care, transfers, ambulation, and ascending / descending stairs. Charges:  Timed Code Treatment Minutes: 90   Total Treatment Minutes: 90     [] EVAL - LOW (31121)   [] EVAL - MOD (93964)  [] EVAL - HIGH (00954)  [] RE-EVAL (18108)  [] BR(71586) x   1    [] Ionto  [] NMR (20130) x       [] Vaso  [x] Manual (09072) x   2   [] Ultrasound  [x] TA x   3    [] Mech Traction (66791)  [] Aquatic Therapy x     [] ES (un) (40106):   [] Home Management Training x  [] ES(attended) (68852)   [] Dry Needling 1-2 muscles (84646):  [] Dry Needling 3+ muscles (291796  [] Group:      [x] Other: gaitx1    GOALS:  Patient stated goal: \"Pt wants to be able to stand with his walker and walk household distances\"  [x] Progressing: [] Met: [] Not Met: [] Adjusted     Therapist goals for Patient:   Short Term Goals: To be achieved in: 2 weeks  1. Independent in HEP and progression per patient tolerance, in order to prevent return of swelling   [] Progressing: [x] Met: [] Not Met: [] Adjusted  2. Patient will have a decrease in swelling/pain to facilitate improvement in movement, function, and ADLs as indicated by improvement with LLIS.   [] Progressing: [x] Met: [] for improved consistency with appropriate wrapping - with appropriate compression gradient, cleaning of LE at his facility. Pt will benefit form regular use of advanced lymph pump. He might benefit form trial of circaids for feet and LE as they would be easier to apply and should be adequate to provide approp compression. 9/27 Pt further educated on importance of stretching R knee into ext as he continues to lack full knee ext with amb. Completed skin care this date. Pt with decreased skin dryness and skin irritation compared to previous visit. Pt has been complaint with over 4 weeks of compression, elevation, and exercise, and edema in BLE and trunk remains. Pt will benefit from advanced lymphedema pump due to truncal swelling and increased abdominal measurement following basic pump trial.     Per Adeel Bassett from [facility] lymphedema pump trial:    Patient trialed the basic pump for about 15 mins @ 30mmHg pressure on the right leg, below are the results:     Before/ after/ difference:   Abdomen: 134.0cm/ 134.1cm/ +0.1cm   Thigh: 60.0cm/ 60.0cm/ +/- 0cm  Knee: 54.1cm/ 53.8cm/ -0.30cm  Calf: 41.9cm/ 41.9cm/ +/- 0cm  Ankle: 38.7cm/ 38.6cm/ -0.1cm     Patient then trialed the [address] on the right leg and abdomen at normal pressure for about 15 minutes, below are the results:     Before/ after/ difference:  Abdomen: 134.1cm/ 133.7cm/ -0.4cm  Thigh: 60.0 cm/ 59.1cm/ -0.9cm  Knee: 53.8cm/ 53.7cm/ -0.1cm  Calf: 41.9cm/ 40.8cm/ -1.1cm   Ankle: 38.6cm/ 38.3cm/ -0.3cm    9/22 pt appears to have abdominal swelling. If this persists, he would benefit from advanced lymph pump to include abdomen  pt feels that staff at [facility] are trying and learning how to take care of his legs. Pt advocating for himself as best as he can and receptive to instruction to cont to do so. Pt cont to benefit form compression , walking MLD.   Will benefit greatly form lymph pump to use daily/most days    9/20 Pt with skin irritation around bandage on R dorsum of foot. Contacted staff at [facility] this date regarding appropriate skin care and bandaging. See above. Pt provided with note regarding distal to proximal compression and daily skin care. Continue with MLD, compression, and addition of home lymphedema pump to further decrease edema and fibrotic tissue. 9/15 New wound R ant shin: [id number] scab surrounded by [id number] red area. Increased pitting edema today - attribute this to staff at [place] no longer wrapping his LE daily. Pt very frustrated by decreased wound and lymphedema care. Pt talking to head RN about it . Gave pt contact info for [facility]a but advised pt to be careful how he approaches this   States he talked to [facility] director of nursing on 9/13 regarding new wound care nurse - she told pt she would check into everything - daily wound care and daily wrapping. Pt reports he hasn't heard back from her.    9/13 Washed BLE and applied lotion as pt with dry skin on B lower legs and feet, R LE > L LE. Provided notes for nursing staff at pt's home to wash and moisturize legs daily and wrap prior to NV to allow PT to assess bandaging technique. Pt provided with phone number for [facility] medical to call regarding lymphedema pump trial as he has not been in contact with them recently. Continue with MLD, exercise, compression, and skin care to decrease edema and improve functional mobility. 9/8 pt needs assist with consistent and excellent skin care and daily bandaging. Staff turnover/changes at his facility has negatively impacted the health of his skin     9/6 swelling B LE much improved except significant swelling of feet and toes cont to be a problem R>L. Continues with wound R dorsal foot. Lymph pump should help prevent future wounds - pt reports hx of wounds on LE and infections since 2017.   trial of  bandaging with extra foam today - to cover larger area at ankles and entire dorsum of foot - including dorsal toes    9/1 PN completed this date. Pt with significant decrease in edema in RLE this date with only 1 wound on dorsal ankle crease. Slight decreased edema in LLE. Pt with improved skin health with decreased skin dryness. Educated to continue with moisturizing, bandaging, and exercise to further decrease edema in B LE's and improve functional mobility. Treatment/Activity Tolerance:  [x] Patient able to complete tx [] Patient limited by fatigue  [] Patient limited by pain  [] Patient limited by other medical complications  [] Other:     Prognosis: [x] Good [] Fair  [] Poor    Patient Requires Follow-up: [x] Yes  [] No    Plan for next treatment session:   MLD, compression - bandage B LE below knee , HEP, pt education, lymph pump  B LE, lymph pump for home to help prevent chronic wounds R>L LE, exercise to stimulate lymphatic flow, LE ex, balance, gait, fxnl mobility    PLAN: See eval. PT 2x / week for 6 weeks. + 2x/wk for 6 wks   [x] Continue per plan of care [] Alter current plan (see comments)  [] Plan of care initiated [] Hold pending MD visit [] Discharge    Electronically signed by: Lea Drake, PT, DPT      Note: If patient does not return for scheduled/ recommended follow up visits, this note will serve as a discharge from care along with most recent update on progress.

## 2022-10-04 ENCOUNTER — HOSPITAL ENCOUNTER (OUTPATIENT)
Dept: PHYSICAL THERAPY | Age: 77
Setting detail: THERAPIES SERIES
Discharge: HOME | End: 2022-10-04
Payer: COMMERCIAL

## 2022-10-04 PROCEDURE — 97140 MANUAL THERAPY 1/> REGIONS: CPT

## 2022-10-04 PROCEDURE — 97530 THERAPEUTIC ACTIVITIES: CPT

## 2022-10-04 NOTE — FLOWSHEET NOTE
[facility]  Phone: [phone]   Fax: [phone]    Physical Therapy Re-Certification Plan of Care    Dear Mari Del Rio NP  ,    We had the pleasure of treating the following patient for physical therapy services at [facility]. A summary of our findings can be found in the updated assessment below. This includes our plan of care. If you have any questions or concerns regarding these findings, please do not hesitate to contact me at the office phone number checked above. Thank you for the referral.     Physician Signature:________________________________Date:__________________  By signing above (or electronic signature), therapist's plan is approved by physician      Functional Outcome:   FOTO physical FS primary measure score = 39; risk adjusted = 47                  6/30/22  FOTO physical FS primary measure score = 46                 8/2/22  FOTO physical FS primary measure score = 47                 9/1/22    Overall Response to Treatment:   [x]Patient is responding well to treatment and improvement is noted with regards  to goals   []Patient should continue to improve in reasonable time if they continue HEP   []Patient has plateaued and is no longer responding to skilled PT intervention    []Patient is getting worse and would benefit from return to referring MD   []Patient unable to adhere to initial POC   [x]Other: POC completed this date. Pt presents with decreased edema in BLE compared to last PN and eval. Pt with wounds healed on RLE. Pt continues with edema in B LE, especially feet and ankles. Increased edema in LLE compared to LV as pt was unwrapped for about 1.5 days due to report of nursing staff at his home reporting they are only wrapping RLE as it was followed by wound care.  Pt provided with note from PT requesting BLE bandages and both bandages wrapped distal to proximal. Pt will benefit from additional OP PT to further decrease edema in BLE and increase focus on functional mobility. Date range of Visits: 22 - 10/4/22  Total Visits: 23    Recommendation:    [x]Continue PT 1x / wk for 5 weeks. []Hold PT, pending MD visit      Physical Therapy Daily LYMPHEDEMA Treatment Note    Date:  10/4/2022    Patient Name:  Samanta Kennedy   \"JANELLE\"  :  1945  MRN: [id number]  Restrictions/Precautions:    Medical/Treatment Diagnosis Information:  Diagnosis: BILATERAL LOWER EXTREMITY LYMPHEDEMA  Treatment Diagnosis: B LE chronic swelling and wounds R > L and decreased B LE strength, decreased functional mobilityPT diagnosis:  Insurance/Certification information:  PT Insurance Information: [employer] - no auth/no copay/BMN  Physician Information:  Carole Shen NP    Plan of care signed (Y/N): []  Yes [x]  No     Date of Patient follow up with Physician:       Progress Report: []  Yes  [x]  No     Date Range for reporting period:  Beginnin22  PT POC 22 - faxed for signature  PN   PN   POC 10/4  Ending    Progress report due (10 Rx/or 30 days whichever is less): visit #10 or      Recertification due (POC duration/ or 90 days whichever is less): 10/21/22    Visit # Insurance Allowable Auth required? Date Range    +  + 0/5 BMN []  Yes  [x]  No pcy        Latex Allergy:  [x]NO      []YES  Preferred Language for Healthcare:   [x]English       []other:      Functional Scale:                                                                                                      Date assessed:  FOTO physical FS primary measure score = 39; risk adjusted = 47                  22  FOTO physical FS primary measure score = 46                 22  FOTO physical FS primary measure score = 47                 22  *COMPLETE NV*     Pain level:  0/10  B LE    SUBJECTIVE:  Pt reports he doing well.  States nursing reports they are not treating his LLE only the R.     OBJECTIVE:     GIRTH MEASUREMENT  (Tape on skin along anterior tibialis)     Lower Extremity Right (cm) Left  (cm) Right (cm) Left (cm) Right (cm) Left (cm)  Right (cm) Left (cm)    Date 6/30/22 6/30/22 8/2 8/2 9/1 9/1 9/22 10/4 10/4   Measurements taken as follows: 0 cm at inf aspect of lateral malleolus and marked on    [] Ant aspect of LE    [x] Lateral aspect of LE    [] sheet                             cm  Widest at hip              cm at waist (at umbilicus), measured while reclined on hospital bed 132.0 cm       134.5 cm on exhale  135.0 cm on inhale    While reclined on hospital bed                    Metatarsal heads 25.7cm 25.7cm 26.1 25.5 25.0 25.5  25.1 26.8   0 Cm above inf aspect of  LATERAL MALLEOLUS 37.8 35.5 cm 34.9 32.5 35.2 33.9  32.3 33.0    10 Cm above inf aspect of  LATERAL MALLEOLUS    38.7 33.4cm 39.1 30.6 31.9 32.0  27.0 31.7   20 Cm above  inf aspec of LATERAL MALLEOLUS 45.3 38.5 44.4 39.8 34.8 39.0  31.8 37.5   30 Cm above  inf aspect of  LATERAL MALLEOLUS 46.4 41.4 45.5 46.8 39.7 42.0  38.4 40.7                  Total Girth 193.9 174.5 190.0 175.2 166.6  172.4  154.6 cm  169.7 cm     10/4   LLE total girth decreased by 2.7 cm compared to 9/1 and 4.8 cm compared to eval   LLE total girth decreased by 12.0 cm compared to 9/1 and 39.3 cm compared to eval     9/20 skin irritation on dorsum of R foot around border of wound care bandage    9/15 pt to clinic without wraps on - states he removed them yesterday bc they were falling off and staff did not come when requested to put back on. Increased pitting edema today. New wound R ant shin: [id number] scab surrounded by 2uyr9dl red area. States he talked to Lillie director of nursing on 9/13 regarding new wound care nurse - she told pt she would check into everything - daily wound care and daily wrapping.  Pt reports he hasn't heard back from her.    9/8 pt's LEs B  not washed since Kaley PT washed them on 9/1 during PT session. Wound dorsal R foot: 1.3cmx 1.8cm. red-tiarra Penelope Navas drainage; red rectangle of skin near/around wound   9/6 Pt reports hx of wounds on LE and infections since 201 9/1  LLE total girth decreased by 2.8 cm compared to 8/2  RLE total girth decreased by 23.4 cm compared to 8/2 8/30 small opening on R foot. Decreased edema B lower legs with increased edema at knees. 8/16/22: observe:    L LE: increased swelling at dorsum of foot and toes. Decreased swelling ankle to below knee. Foam rectangle at ankle reduces constriction  at ankle  R LE: increased swelling, increased redness and increased warmth compared to L LE. Concern re possible cellulitis. PT called RN at pt's living facility - see below  Deferred CKC ex due to possible cellulitis R LE      8/9 overall reduction in swelling and wounds healing. Distal swelling present at R foot and toes - needs improved compression gradient with bandages  NEW Blister opened R ant/lat shin: 1.3cm x 0.9cm. new red area R dorsal foot: 2\" x 1.5\" (Not open). PT cleaned opened blister area with alcohol wipe and covered with tegaderm - communicated to pt's RN on communication form    8/2    RLE decreased total girth by 3.9 cm   LLE increased total girth by 0.7 cm     7/21   Pt to clinic wearing 2 tensoshape each LE from mid foot to below knee. Signs of constriction present B LE ant ankle and R LE below knee - inappropriate compression gradient. Dorsal foot swelling increased B - due to constriction at ankles from rolled and doubled tensoshapes  causing constriction  multiple new wounds present B LEs:  L medial calf: 1.5 x 0.8cm, red area L ant ankle: 2.7cnx2.0 cm; R ant shin: 1.7cmx1.7cm, R ant ankle: not yet open but very red: 3.5cmx2.5 with black area in center of red area: 1.0cm x 0.9cm  Redness present B dorsal feet and R LE below knee. Increased warmth R LE compared to L. Concern for possible cellulitis.      RESTRICTIONS/PRECAUTIONS: Exercises/Interventions:     Therapeutic Exercises (44750) Resistance / level Sets/sec Reps Notes   Nu step See below      Pt educated on exercises to stimulate lymphatic flow        CKC LE ex at // bars or counter       Ankle pumps  Toe DF/PF  SAQ  Gastroc stretch with active DF   T/o session                 Therapeutic Activities (00002)  (Dynamic activities such as compression, designed to improve functional performance)       Transfers:  sit to/from stand from  and hospital bed   VC for knee, hip, and trunk ext    Applied dressing to wound: PT cleaned area with alcohol wipe and covered with bandage      Compression: trial tensoshape size   applied to B LE - XL   7/15 Educated to follow up with wound care nurse when he returns home due to weeping and need for dressing change. Pt states he is on oral antibiotic      Multilayer compression bandaging to BLE  Stockinette    Horseshoe at B ankle  Large Foam rectangle at dorsum of B feet and dorsal toes   - held this date due to wounds    2x12 cm low stretch compression bandage    7/29 unwrapped lower extremities. Discussed increased compression at foot and wrapping distal to proximal. Wrote notes for pt's nurse. 8/5, 8/2 unwrapped. Educated to increase compression proximally   9/20, 8/26 unwrapped LE's - educated to increase compression at feet and ankles and to wrap to just under knee. Notes written for pt's nurse     POC completed 10/4 - objective measures taken. Discussed progress made with therapy. Educated to continue with bandaging, skin care, and ambulation/HEP       X 10 min total          X 10 min  10/4 LLE only this date due to only RLE wrapped upon arrival. Pt forgot other bandages at home.    7/26 educated pt and Arelis (wound care nurse) on bandaging    Ambulation    9/8 deferred due to time constrains - assisted pt to wash B LE and applied lotion    9/6 gait limited by fatigue 7/21 pt SOB after   HEP   - LLD knee ext stretch   - quad set   - ankle pump  - LAQ      Transfer w/c <> bed - SBA  Transfer sit to/form stand at wc  Transfer sit <> supine X 1   VC for knee ext    Nu step   S=11, arms =10, workload =3   8/16 deferred due to possible cellulitis    Wearing multilayer bandaging   Rest break     9/29, 9/27, 9/229/8, 8/30 Unwrapped B LE's. Educated on bandaging up to knee crease. Washed B lower legs with gentle soap and applied lotion. Educated on importance of skin health and keeping legs clean and moisturized. 9/15, 9/13 Washed B lower legs with gentle soap and applied lotion. Educated on importance of skin health and keeping legs clean and moisturized. Wrote notes for Pt's nurse. Provided with phone number for [facility] medical to obtain home lymphedema pump as he has not been in contact with them recently         X 20 min  Requested staff at [place] [id number] point do daily wound care /dressing change and daily wrapping of LE. Advised we are happy to teach staff how to wrap.                 9/13 education partially completed during MLD    Home Management  (providing pt education on safety procedures/instructions)       Pt educated on compression as follows:   how to appropriately apply and wear compression  how to maintain appropriate gradient compression  do not sleep with compression garment/tensoshape  signs and symptoms of constriction   when to remove compression       Pt educated on lymphedema prevention and management    7/12 completed during MLD    Pt educated on MLD                            Neuromuscular Re-ed (25147)       balance                     Manual Intervention (80602)      See MLD flowchart below   X 40 min   7/21 deferred due to concern re possible cellulitis                                        Manual Lymph Drainage (MLD):  MLD to B LE , clearing along alternate pathway of  Ipsilateral trunk  to  Ipsilateral axillary  lymph nodes    Clear Nodes 10x each   Neck x   [address]    Axilla x   Abdomen x   Groin x   Popliteal x2 x   Clear Alternate Pathway 10x each   Re-clear alternate pathway   x5 each position x   Location Ipsilateral trunk       Fluid Mobilization 10x each   Re-clear alternate pathway   x5 each position x   Shoulder bracing    Location B LE and ipsilateral trunk       Protein Resorption 10x each   Location LE below knee       Clear Foot/Ankle or Hand 10x each   Achilles x   Bilateral malleolus x   Fan the cards x   Clear dorsum x   Clear through web space x   Clear toes/fingers x   Fluid mobilization x   Re-clear all positions  X5 each x       Modalities:     OTHER:     9/29 PT sent another note to pt's facility re please re-wrap, lotion and wash LE daily. ,  asked staff to use new tape when re-wrapping.       9/22 pt ed re how to advocate for himself for LE washing and bandaging - pt agreeable. D/w pt PT will call [facility] during next session if needed. 9/20 Yuliet Ingram, PT, DPT contacted [facility] and spoke with Domingo Anguiano RN regarding need for compression daily when lower extremities are in a dependent position. Phone call transferred to Vinicio Real, PT, DPT who spoke with wound care nurse, Jazzmine Alves regarding pt's compression. Discussed need for skin care and bandaging daily with gradient compression wrapping from distal to proximal from toes to just below knee. Jazzmine Alves states this is the current POC. PT provided pt with note requesting daily skin care and compression distal to proximal.      9/15 gave pt written note requesting daily wound care and daily bandage changes. Advised we are happy to teach facility staff how to wrap. Advised pt to contact Lexis bermeo [facility] to schedule pump demo. Advised pt to look into should he request (and pay for ) additional 1-2 units of ADLs assist a day so staff can/will wrap his legs?  Pt is agreeable    9/8 gave pt handout for RN at facility requesting pt get daily assist with lower body bathing, application of lotion to LE and bandaging of LE    9/6 gave pt handout with written bandaging directions fr new RN at facility    8/16/22 1121am PT called Mone Riley director of nursing next at [phone] re pt- discussed concern re possible new onset cellulitis R  LE. She will call pt;'s MD re possible start of antibiotics for cellulitis. 7/21 pt signed release of info form for PT to communicate with medical staff [facility]     7/21 PT called Jose Roberto Merritt NP  re pt- discussed concern re possible new onset cellulitis R and or L LE, new onset wounds, inappropriate compression gradient with 2 tensoshapes that rolled. Frantz Juárez  states she will call in antibiotic. At pt request, PT called RN Eulalia Vo [phone]; unable to LM due to VM full. Called Lisa director of nursing next at [phone] - no answer, no VM. PT wrote note to RNs a pt's independent living facility re need for approp compression gradient and pt will order lymphedema bandages - low stretch 2x8cm, 4x12 cm, 2x15 cm artiflex. 6/30 pt signed release of info for PT to send PT notes and insurance info to [facility] re possibility of getting lymphedema pump for B LE    Pt education:   9/22, 9/8 pt ed on approp skin care and reveiwed re approp bandaging  8/9 review HEP - see below d/w pt benefit of rewrapping daily, how to void constriction marks and gradually increasing walking duration/distance- wrote this on his note to RN  7/21 extensive pt ed re appropriate compression and compression gradient, need to avoid constriction    6/30   pt ed and provided with form on what compression wrappings to purchase. Ed on ankle pumps and toe flx/ext to stimulate lymphatic flow. Pt educated on pathology and anatomy of etiology of lymphedema, condition precautions, indications for long term prognosis.     Pt was educated on diagnosis; prognosis; PT POC including MLD, compression (role of multilayer bandaging/ compression garments), lymphedema management/prevention of flare ups, role of exercise, HEP, lymphedema pump; expectations for rehab. All pt questions were answered. HEP instruction:  9/6 extra foam under wraps    8/9 increase duration of walks by 15-60 sec; goal - increase walk duration so he can do 1-2 laps at facility. Re-wrap daily as able- trial of grey foam at ankles    7/26   Access Code: [email or web address]  URL: [email or web address]  Date: 07/26/2022  Prepared by: Ezra Zaragoza    Exercises  Long Sitting Quad Set - 3 x daily - 7 x weekly - 1 sets - 10 reps - 5 sec hold  Supine Knee Extension Stretch on Towel Roll - 3 x daily - 7 x weekly - 5-6 min hold    7/21 pt to order low stretch compression bandages: 2x8cm, 4x12 cm and artiflex 2x15 cm and bring to next appt. Encouraged pt to try to plan for a staff member to come to appt to be instructed in lymphedema wrapping and approp comrpession gradient    Eval: Pt instructed in lymphedema management/prevention concepts and swipe method of MLD, ankle pumps, toe DF/PF    Therapeutic Exercise and NMR EXR  [] (26489) Provided verbal/tactile cueing for activities related to strengthening, flexibility, endurance, ROM for improvements in  [] LE / Lumbar: LE, proximal hip, and core control with self care, mobility, lifting, ambulation. [] UE / Cervical: cervical, postural, scapular, scapulothoracic and UE control with self care, reaching, carrying, lifting, house/yardwork, driving, computer work.  [] (84620) Provided verbal/tactile cueing for activities related to improving balance, coordination, kinesthetic sense, posture, motor skill, proprioception to assist with   [] LE / lumbar: LE, proximal hip, and core control in self care, mobility, lifting, ambulation and eccentric single leg control.    [] UE / cervical: cervical, scapular, scapulothoracic and UE control with self care, reaching, carrying, lifting, house/yardwork, driving, computer work.   [] (48079) Therapist is in constant attendance of 2 or more patients providing skilled therapy interventions, but not providing any significant amount of measurable one-on-one time to either patient, for improvements in  [] LE / lumbar: LE, proximal hip, and core control in self care, mobility, lifting, ambulation and eccentric single leg control. [] UE / cervical: cervical, scapular, scapulothoracic and UE control with self care, reaching, carrying, lifting, house/yardwork, driving, computer work. NMR and Therapeutic Activities:    [x] (08687 or 66819) Provided verbal/tactile cueing for activities related to improving balance, coordination, kinesthetic sense, posture, motor skill, proprioception and motor activation to allow for proper function of   [x] LE: / Lumbar core, proximal hip and LE with self care and ADLs  [] UE / Cervical: cervical, postural, scapular, scapulothoracic and UE control with self care, carrying, lifting, driving, computer work.   [] (36734) Gait Re-education- Provided training and instruction to the patient for proper LE, core and proximal hip recruitment and positioning and eccentric body weight control with ambulation re-education including up and down stairs     Home Management Training / Self Care:  [] (71372) Provided self-care/home management training related to activities of daily living and compensatory training, and/or use of adaptive equipment for improvement with: ADLs and compensatory training, meal preparation, safety procedures and instruction in use of adaptive equipment, including bathing, grooming, dressing, personal hygiene, basic household cleaning and chores.      Home Exercise Program:    [] (55902) Reviewed/Progressed HEP activities related to strengthening, flexibility, endurance, ROM of   [] LE / Lumbar: core, proximal hip and LE for functional self-care, mobility, lifting and ambulation/stair navigation   [] UE / Cervical: cervical, postural, scapular, scapulothoracic and UE control with self care, reaching, carrying, lifting, house/yardwork, driving, computer work  [] (75635)Reviewed/Progressed HEP activities related to improving balance, coordination, kinesthetic sense, posture, motor skill, proprioception of   [] LE: core, proximal hip and LE for self care, mobility, lifting, and ambulation/stair navigation    [] UE / Cervical: cervical, postural,  scapular, scapulothoracic and UE control with self care, reaching, carrying, lifting, house/yardwork, driving, computer work    Manual Treatments:  PROM / STM / Oscillations-Mobs:  G-I, II, III, IV (PA's, Inf., Post.)  [x] (22943) Provided manual therapy to mobilize LE, proximal hip and/or LS spine soft tissue/joints for the purpose of modulating pain, promoting relaxation,  increasing ROM, reducing/eliminating soft tissue swelling/inflammation/restriction, improving soft tissue extensibility and allowing for proper ROM for normal function with   [x] LE / lumbar: self care, mobility, lifting and ambulation. [] UE / Cervical: self care, reaching, carrying, lifting, house/yardwork, driving, computer work. Modalities:  [] (42951) Vasopneumatic compression: Utilized vasopneumatic compression to decrease edema / swelling for the purpose of improving mobility and quad tone / recruitment which will allow for increased overall function including but not limited to self-care, transfers, ambulation, and ascending / descending stairs.        Charges:  Timed Code Treatment Minutes: 80   Total Treatment Minutes: 80     [] EVAL - LOW (77577)   [] EVAL - MOD (66099)  [] EVAL - HIGH (06858)  [] RE-EVAL (13533)  [] DH(15004) x   1    [] Ionto  [] NMR (94883) x       [] Vaso  [x] Manual (64603) x   2   [] Ultrasound  [x] TA x   3    [] Mech Traction (68297)  [] Aquatic Therapy x     [] ES (un) (39685):   [] Home Management Training x  [] ES(attended) (60795)   [] Dry Needling 1-2 muscles (91557):  [] Dry Needling 3+ muscles (862417  [] Group:      [] Other: gaitx1    GOALS:  Patient stated goal: \"Pt wants to be able to stand with his walker and walk household distances\"  [x] Progressing: [] Met: [] Not Met: [] Adjusted     Therapist goals for Patient:   Short Term Goals: To be achieved in: 2 weeks  1. Independent in HEP and progression per patient tolerance, in order to prevent return of swelling   [] Progressing: [x] Met: [] Not Met: [] Adjusted  2. Patient will have a decrease in swelling/pain to facilitate improvement in movement, function, and ADLs as indicated by improvement with LLIS. [] Progressing: [x] Met: [] Not Met: [] Adjusted     Long Term Goals: To be achieved in: 6 weeks  1. FOTO score of at least 46 to assist with reaching prior level of function. [] Progressing: [x] Met: [] Not Met: [] Adjusted  2. Patient will demonstrate increased AROM/strength of BLE to 4/5 so that pt can resume walking and standing without increase in symptoms. [x] Progressing: [] Met: [] Not Met: [] Adjusted  3. Decrease swelling of BLEs by 5cm so that pt can return to functional activities including standing, walking, squatting, and lifting without increased symptoms or restriction. [x] Progressing: [x] Met: MET RLE [] Not Met: [] Adjusted  4. Patient will be able to stand for >/= 5 minutes in order to improve standing tolerance for performing ADLs. [x] Progressing: [] Met: [] Not Met: [] Adjusted  5. Patient will be able to ambulate short community distances (300-500ft) with the LRAD to improve functional mobility in his home and community. [x] Progressing: [] Met: [] Not Met: [] Adjusted    Overall Progression Towards Functional goals/ Treatment Progress Update:  [x] Patient is progressing as expected towards functional goals listed. [] Progression is slowed due to complexities/Impairments listed. [] Progression has been slowed due to co-morbidities.   [] Plan just implemented, too soon to assess goals progression <30days   [] Goals require adjustment due to lack of progress  [] Patient is not progressing as expected and requires additional follow up with physician  [] Other    Persisting Functional Limitations/Impairments:  []Sleeping []Sitting               [x]Standing [x]Transfers        [x]Walking [x]Kneeling               [x]Stairs [x]Squatting / bending   [x]ADLs []Reaching  [x]Lifting  [x]Housework  []Driving []Job related tasks  []Sports/Recreation []Other:        ASSESSMENT:    10/4 POC completed this date. Pt presents with decreased edema in BLE compared to last PN and eval. Pt with wounds healed on RLE. Pt continues with edema in B LE, especially feet and ankles. Increased edema in LLE compared to LV as pt was unwrapped for about 1.5 days due to report of nursing staff at his home reporting they are only wrapping RLE as it was followed by wound care. Pt provided with note from PT requesting BLE bandages and both bandages wrapped distal to proximal. Pt will benefit from additional OP PT to further decrease edema in BLE and increase focus on functional mobility. 9/29 excellent response to the flexitouch trial with [facility] from [facility] on 9/23/22. Overall pt's LE cont to improve. Pt would greatly benefit for improved consistency with appropriate wrapping - with appropriate compression gradient, cleaning of LE at his facility. Pt will benefit form regular use of advanced lymph pump. He might benefit form trial of circaids for feet and LE as they would be easier to apply and should be adequate to provide approp compression. 9/27 Pt further educated on importance of stretching R knee into ext as he continues to lack full knee ext with amb. Completed skin care this date. Pt with decreased skin dryness and skin irritation compared to previous visit. Pt has been complaint with over 4 weeks of compression, elevation, and exercise, and edema in BLE and trunk remains.  Pt will benefit from advanced lymphedema pump due to truncal swelling and increased abdominal measurement following basic pump trial.     Per [facility] from [facility] lymphedema pump trial:    Patient trialed the basic pump for about 15 mins @ 30mmHg pressure on the right leg, below are the results:     Before/ after/ difference:   Abdomen: 134.0cm/ 134.1cm/ +0.1cm   Thigh: 60.0cm/ 60.0cm/ +/- 0cm  Knee: 54.1cm/ 53.8cm/ -0.30cm  Calf: 41.9cm/ 41.9cm/ +/- 0cm  Ankle: 38.7cm/ 38.6cm/ -0.1cm     Patient then trialed the [facility] on the right leg and abdomen at normal pressure for about 15 minutes, below are the results:     Before/ after/ difference:  Abdomen: 134.1cm/ 133.7cm/ -0.4cm  Thigh: 60.0 cm/ 59.1cm/ -0.9cm  Knee: 53.8cm/ 53.7cm/ -0.1cm  Calf: 41.9cm/ 40.8cm/ -1.1cm   Ankle: 38.6cm/ 38.3cm/ -0.3cm    9/22 pt appears to have abdominal swelling. If this persists, he would benefit from advanced lymph pump to include abdomen  pt feels that staff at [facility] are trying and learning how to take care of his legs. Pt advocating for himself as best as he can and receptive to instruction to cont to do so. Pt cont to benefit form compression , walking MLD. Will benefit greatly form lymph pump to use daily/most days    9/20 Pt with skin irritation around bandage on R dorsum of foot. Contacted staff at [facility] this date regarding appropriate skin care and bandaging. See above. Pt provided with note regarding distal to proximal compression and daily skin care. Continue with MLD, compression, and addition of home lymphedema pump to further decrease edema and fibrotic tissue. 9/15 New wound R ant shin: [id number] scab surrounded by [id number] red area. Increased pitting edema today - attribute this to staff at [facility] no longer wrapping his LE daily. Pt very frustrated by decreased wound and lymphedema care. Pt talking to head RN about it .   Gave pt contact info for mara but advised pt to be careful how he approaches this   States he talked to [address] [place] [address] director of nursing on 9/13 regarding new wound care nurse - she told pt she would check into everything - daily wound care and daily wrapping. Pt reports he hasn't heard back from her.    9/13 Washed BLE and applied lotion as pt with dry skin on B lower legs and feet, R LE > L LE. Provided notes for nursing staff at pt's home to wash and moisturize legs daily and wrap prior to NV to allow PT to assess bandaging technique. Pt provided with phone number for [employer] [facility] to call regarding lymphedema pump trial as he has not been in contact with them recently. Continue with MLD, exercise, compression, and skin care to decrease edema and improve functional mobility. 9/8 pt needs assist with consistent and excellent skin care and daily bandaging. Staff turnover/changes at his facility has negatively impacted the health of his skin     9/6 swelling B LE much improved except significant swelling of feet and toes cont to be a problem R>L. Continues with wound R dorsal foot. Lymph pump should help prevent future wounds - pt reports hx of wounds on LE and infections since 2017. trial of  bandaging with extra foam today - to cover larger area at ankles and entire dorsum of foot - including dorsal toes    9/1 PN completed this date. Pt with significant decrease in edema in RLE this date with only 1 wound on dorsal ankle crease. Slight decreased edema in LLE. Pt with improved skin health with decreased skin dryness. Educated to continue with moisturizing, bandaging, and exercise to further decrease edema in B LE's and improve functional mobility.      Treatment/Activity Tolerance:  [x] Patient able to complete tx [] Patient limited by fatigue  [] Patient limited by pain  [] Patient limited by other medical complications  [] Other:     Prognosis: [x] Good [] Fair  [] Poor    Patient Requires Follow-up: [x] Yes  [] No    Plan for next treatment session:   MLD, compression - bandage B LE below knee , HEP, pt education, lymph pump  B LE, lymph pump for home to help prevent chronic wounds R>L LE, exercise to stimulate lymphatic flow, LE ex, balance, gait, fxnl mobility    PLAN: See eval. PT 2x / week for 6 weeks. + 2x/wk for 6 wks   [x] Continue per plan of care [] Alter current plan (see comments)  [] Plan of care initiated [] Hold pending MD visit [] Discharge    Electronically signed by: Katia Smith, PT, DPT      Note: If patient does not return for scheduled/ recommended follow up visits, this note will serve as a discharge from care along with most recent update on progress.

## 2022-10-06 ENCOUNTER — HOSPITAL ENCOUNTER (OUTPATIENT)
Dept: PHYSICAL THERAPY | Age: 77
Setting detail: THERAPIES SERIES
Discharge: HOME | End: 2022-10-06
Payer: COMMERCIAL

## 2022-10-06 PROCEDURE — 97530 THERAPEUTIC ACTIVITIES: CPT

## 2022-10-06 PROCEDURE — 97116 GAIT TRAINING THERAPY: CPT

## 2022-10-06 PROCEDURE — 97140 MANUAL THERAPY 1/> REGIONS: CPT

## 2022-10-06 NOTE — FLOWSHEET NOTE
[facility] - Outpatient Physical Therapy           Physical Therapy Daily LYMPHEDEMA Treatment Note    Date:  10/6/2022    Patient Name:  Hermann Monsivais   \"JANELLE\"  :  1945  MRN: [id number]  Restrictions/Precautions:    Medical/Treatment Diagnosis Information:  Diagnosis: BILATERAL LOWER EXTREMITY LYMPHEDEMA  Treatment Diagnosis: B LE chronic swelling and wounds R > L and decreased B LE strength, decreased functional mobilityPT diagnosis:  Insurance/Certification information:  PT Insurance Information: [address] [place] [address] - no auth/no copay/BMN  Physician Information:  Gill Hatch NP    Plan of care signed (Y/N): []  Yes [x]  No     Date of Patient follow up with Physician:       Progress Report: []  Yes  [x]  No     Date Range for reporting period:  Beginnin22  PT POC 22 - faxed for signature  PN   PN   POC 10/4  Ending    Progress report due (10 Rx/or 30 days whichever is less): visit #10 or      Recertification due (POC duration/ or 90 days whichever is less): 10/21/22    Visit # Insurance Allowable Auth required? Date Range    +  + 0/5 BMN []  Yes  [x]  No pcy        Latex Allergy:  [x]NO      []YES  Preferred Language for Healthcare:   [x]English       []other:      Functional Scale:                                                                                                      Date assessed:  FOTO physical FS primary measure score = 39; risk adjusted = 47                  22  FOTO physical FS primary measure score = 46                 22  FOTO physical FS primary measure score = 47                 22  FOTO physical FS primary measure score = 55                 10/6/22  *COMPLETE NV*     Pain level:  0/10  B LE    SUBJECTIVE:  reports the attitude of staff at [facility] has improved and he expects he will be better cared for moving forward.   He expects staff will start washing his LE and wrapping his LE more regularly. Reports RNs have washed and lotioned his LE 2x so far - he hopes it will be better moving forward. OBJECTIVE: 10/6 pt wearing wraps. Reports RNs washed and lotioned his LE yesterday. GIRTH MEASUREMENT  (Tape on skin along anterior tibialis)     Lower Extremity Right (cm) Left  (cm) Right (cm) Left (cm) Right (cm) Left (cm)  Right (cm) Left (cm)    Date 6/30/22 6/30/22 8/2 8/2 9/1 9/1 9/22 10/4 10/4   Measurements taken as follows: 0 cm at inf aspect of lateral malleolus and marked on    [] Ant aspect of LE    [x] Lateral aspect of LE    [] sheet                             cm  Widest at hip              cm at waist (at umbilicus), measured while reclined on hospital bed 132.0 cm       134.5 cm on exhale  135.0 cm on inhale    While reclined on hospital bed                    Metatarsal heads 25.7cm 25.7cm 26.1 25.5 25.0 25.5  25.1 26.8   0 Cm above inf aspect of  LATERAL MALLEOLUS 37.8 35.5 cm 34.9 32.5 35.2 33.9  32.3 33.0    10 Cm above inf aspect of  LATERAL MALLEOLUS    38.7 33.4cm 39.1 30.6 31.9 32.0  27.0 31.7   20 Cm above  inf aspec of LATERAL MALLEOLUS 45.3 38.5 44.4 39.8 34.8 39.0  31.8 37.5   30 Cm above  inf aspect of  LATERAL MALLEOLUS 46.4 41.4 45.5 46.8 39.7 42.0  38.4 40.7                  Total Girth 193.9 174.5 190.0 175.2 166.6  172.4  154.6 cm  169.7 cm     10/4   LLE total girth decreased by 2.7 cm compared to 9/1 and 4.8 cm compared to eval   LLE total girth decreased by 12.0 cm compared to 9/1 and 39.3 cm compared to eval     9/20 skin irritation on dorsum of R foot around border of wound care bandage    9/15 pt to clinic without wraps on - states he removed them yesterday bc they were falling off and staff did not come when requested to put back on. Increased pitting edema today. New wound R ant shin: [id number] scab surrounded by [id number] red area.   States he talked to [employer] of nursing on 9/13 regarding new wound care nurse - she told pt she would check into everything - daily wound care and daily wrapping. Pt reports he hasn't heard back from her.    9/8 pt's LEs B  not washed since Kaley PT washed them on 9/1 during PT session. Wound dorsal R foot: 1.3cmx 1.8cm. red-tiarra Herchel Lease drainage; red rectangle of skin near/around wound   9/6 Pt reports hx of wounds on LE and infections since 201    9/1  LLE total girth decreased by 2.8 cm compared to 8/2  RLE total girth decreased by 23.4 cm compared to 8/2 8/30 small opening on R foot. Decreased edema B lower legs with increased edema at knees. 8/16/22: observe:    L LE: increased swelling at dorsum of foot and toes. Decreased swelling ankle to below knee. Foam rectangle at ankle reduces constriction  at ankle  R LE: increased swelling, increased redness and increased warmth compared to L LE. Concern re possible cellulitis. PT called RN at pt's living facility - see below  Deferred CKC ex due to possible cellulitis R LE      8/9 overall reduction in swelling and wounds healing. Distal swelling present at R foot and toes - needs improved compression gradient with bandages  NEW Blister opened R ant/lat shin: 1.3cm x 0.9cm. new red area R dorsal foot: 2\" x 1.5\" (Not open). PT cleaned opened blister area with alcohol wipe and covered with tegaderm - communicated to pt's RN on communication form    8/2    RLE decreased total girth by 3.9 cm   LLE increased total girth by 0.7 cm     7/21   Pt to clinic wearing 2 tensoshape each LE from mid foot to below knee. Signs of constriction present B LE ant ankle and R LE below knee - inappropriate compression gradient.   Dorsal foot swelling increased B - due to constriction at ankles from rolled and doubled tensoshapes  causing constriction  multiple new wounds present B LEs:  L medial calf: 1.5 x 0.8cm, red area L ant ankle: 2.7cnx2.0 cm; R ant shin: 1.7cmx1.7cm, R ant ankle: not yet open but very red: 3.5cmx2.5 with black area in center of red area: 1.0cm x 0.9cm  Redness present B dorsal feet and R LE below knee. Increased warmth R LE compared to L. Concern for possible cellulitis. RESTRICTIONS/PRECAUTIONS:     Exercises/Interventions:     Therapeutic Exercises (59506) Resistance / level Sets/sec Reps Notes   Nu step See below      Pt educated on exercises to stimulate lymphatic flow        CKC LE ex at // bars or counter       Ankle pumps  Toe DF/PF  SAQ  Gastroc stretch with active DF   T/o session                 Therapeutic Activities (13404)  (Dynamic activities such as compression, designed to improve functional performance)       Transfers:  sit to/from stand from  and hospital bed   VC for knee, hip, and trunk ext    Applied dressing to wound: PT cleaned area with alcohol wipe and covered with bandage      Compression: trial tensoshape size   applied to B LE - XL   7/15 Educated to follow up with wound care nurse when he returns home due to weeping and need for dressing change. Pt states he is on oral antibiotic      Multilayer compression bandaging to BLE  Stockinette    Horseshoe at B ankle  Large Foam rectangle at dorsum of B feet and dorsal toes   - held this date due to wounds    2x12 cm low stretch compression bandage    7/29 unwrapped lower extremities. Discussed increased compression at foot and wrapping distal to proximal. Wrote notes for pt's nurse. 8/5, 8/2 unwrapped. Educated to increase compression proximally   9/20, 8/26 unwrapped LE's - educated to increase compression at feet and ankles and to wrap to just under knee. Notes written for pt's nurse     POC completed 10/4 - objective measures taken. Discussed progress made with therapy.  Educated to continue with bandaging, skin care, and ambulation/HEP       X 18 min total  Added tensoshape size L to midfoot to toes - to add additional gradient compression - educated pt to remove is signs /symptoms of constriction          X 10 min- review and set new goals, did FOTO survey  10/4 LLE only this date due to only RLE wrapped upon arrival. Pt forgot other bandages at home. 7/26 educated pt and Arelis (wound care nurse) on bandaging    Ambulation  1x150 ft and 1x70' , 1x10' with RW and CGA/SBA    9/8 deferred due to time constrains - assisted pt to wash B LE and applied lotion    9/6 gait limited by fatigue 7/21 pt SOB after   HEP   - LLD knee ext stretch   - quad set   - ankle pump  - LAQ      Transfer w/c <> bed - SBA  Transfer sit to/form stand at wc  Transfer sit <> supine X 1   VC for knee ext    Nu step   S=11, arms =10, workload =3 5.5min   Sci fit s=19, arms =6    8/16 deferred due to possible cellulitis    Wearing multilayer bandaging   Rest break     9/29, 9/27, 9/229/8, 8/30 Unwrapped B LE's. Educated on bandaging up to knee crease. Washed B lower legs with gentle soap and applied lotion. Educated on importance of skin health and keeping legs clean and moisturized. 9/15, 9/13 Washed B lower legs with gentle soap and applied lotion. Educated on importance of skin health and keeping legs clean and moisturized. Wrote notes for Pt's nurse.  Provided with phone number for [facility] to obtain home lymphedema pump as he has not been in contact with them recently                         9/13 education partially completed during MLD    Home Management  (providing pt education on safety procedures/instructions)       Pt educated on compression as follows:   how to appropriately apply and wear compression  how to maintain appropriate gradient compression  do not sleep with compression garment/tensoshape  signs and symptoms of constriction   when to remove compression       Pt educated on lymphedema prevention and management    7/12 completed during MLD    Pt educated on MLD                            Neuromuscular Re-ed (55063)       balance                     Manual Intervention ([id number])      See MLD flowchart below   X 25 min   7/21 deferred due to concern re possible cellulitis Manual Lymph Drainage (MLD):  MLD to B LE , clearing along alternate pathway of  Ipsilateral trunk  to  Ipsilateral axillary  lymph nodes    Clear Nodes 10x each   Neck x   [address]    Axilla x   Abdomen x   Groin x   Popliteal x2 x   Clear Alternate Pathway 10x each   Re-clear alternate pathway   x5 each position x   Location Ipsilateral trunk       Fluid Mobilization 10x each   Re-clear alternate pathway   x5 each position x   Shoulder bracing    Location B LE and ipsilateral trunk       Protein Resorption 10x each   Location LE below knee       Clear Foot/Ankle or Hand 10x each   Achilles x   Bilateral malleolus x   Fan the cards x   Clear dorsum x   Clear through web space x   Clear toes/fingers x   Fluid mobilization x   Re-clear all positions  X5 each x       Modalities:     OTHER:   10/6 note to [facility]:  Requested RNs at [facility] add increased gradient compression at toes and feet  9/29 PT sent another note to pt's facility re please re-wrap, lotion and wash LE daily. ,  asked staff to use new tape when re-wrapping.       9/22 pt ed re how to advocate for himself for LE washing and bandaging - pt agreeable. D/w pt PT will call [facility] during next session if needed. 9/20 Oumar Mendez, PT, DPT contacted [facility] and spoke with Ade Flores RN regarding need for compression daily when lower extremities are in a dependent position. Phone call transferred to Gaudencio Cornelius, PT, DPT who spoke with wound care nurse, Lou Camargo regarding pt's compression. Discussed need for skin care and bandaging daily with gradient compression wrapping from distal to proximal from toes to just below knee. Lou Camargo states this is the current POC. PT provided pt with note requesting daily skin care and compression distal to proximal.      9/15 gave pt written note requesting daily wound care and daily bandage changes. Advised we are happy to teach facility staff how to wrap.   Advised pt to contact Lexis at [facility] to schedule pump demo. Advised pt to look into should he request (and pay for ) additional 1-2 units of ADLs assist a day so staff can/will wrap his legs? Pt is agreeable    9/8 gave pt handout for RN at facility requesting pt get daily assist with lower body bathing, application of lotion to LE and bandaging of LE    9/6 gave pt handout with written bandaging directions fr new RN at facility    8/16/22 1121am PT called Xiang Samuel director of nursing next at [phone] re pt- discussed concern re possible new onset cellulitis R  LE. She will call pt;'s MD re possible start of antibiotics for cellulitis. 7/21 pt signed release of info form for PT to communicate with medical staff at [facility])     7/21 PT called Ayesha Adair NP  re pt- discussed concern re possible new onset cellulitis R and or L LE, new onset wounds, inappropriate compression gradient with 2 tensoshapes that rolled. Jude Fuller  states she will call in antibiotic. At pt request, PT called [facility] [phone]; unable to LM due to VM full. Called Lisa director of nursing next at [phone] - no answer, no VM. PT wrote note to RNs a pt's independent living facility re need for approp compression gradient and pt will order lymphedema bandages - low stretch 2x8cm, 4x12 cm, 2x15 cm artiflex. 6/30 pt signed release of info for PT to send PT notes and insurance info to [facility] re possibility of getting lymphedema pump for B LE    Pt education:   9/22, 9/8 pt ed on approp skin care and reveiwed re approp bandaging  8/9 review HEP - see below d/w pt benefit of rewrapping daily, how to void constriction marks and gradually increasing walking duration/distance- wrote this on his note to RN  7/21 extensive pt ed re appropriate compression and compression gradient, need to avoid constriction    6/30   pt ed and provided with form on what compression wrappings to purchase.  Marleni Parekh on ankle pumps and toe flx/ext to stimulate lymphatic flow. Pt educated on pathology and anatomy of etiology of lymphedema, condition precautions, indications for long term prognosis. Pt was educated on diagnosis; prognosis; PT POC including MLD, compression (role of multilayer bandaging/ compression garments), lymphedema management/prevention of flare ups, role of exercise, HEP, lymphedema pump; expectations for rehab. All pt questions were answered. HEP instruction:  9/6 extra foam under wraps    8/9 increase duration of walks by 15-60 sec; goal - increase walk duration so he can do 1-2 laps at facility. Re-wrap daily as able- trial of grey foam at ankles    7/26   Access Code: [email or web address]  URL: [email or web address]  Date: 07/26/2022  Prepared by: Ezra Zaragoza    Exercises  Long Sitting Quad Set - 3 x daily - 7 x weekly - 1 sets - 10 reps - 5 sec hold  Supine Knee Extension Stretch on Towel Roll - 3 x daily - 7 x weekly - 5-6 min hold    7/21 pt to order low stretch compression bandages: 2x8cm, 4x12 cm and artiflex 2x15 cm and bring to next appt. Encouraged pt to try to plan for a staff member to come to appt to be instructed in lymphedema wrapping and approp comrpession gradient    Eval: Pt instructed in lymphedema management/prevention concepts and swipe method of MLD, ankle pumps, toe DF/PF    Therapeutic Exercise and NMR EXR  [] (08580) Provided verbal/tactile cueing for activities related to strengthening, flexibility, endurance, ROM for improvements in  [] LE / Lumbar: LE, proximal hip, and core control with self care, mobility, lifting, ambulation.   [] UE / Cervical: cervical, postural, scapular, scapulothoracic and UE control with self care, reaching, carrying, lifting, house/yardwork, driving, computer work.  [] (74539) Provided verbal/tactile cueing for activities related to improving balance, coordination, kinesthetic sense, posture, motor skill, proprioception to assist with   [] LE / lumbar: LE, proximal hip, and core control in self care, mobility, lifting, ambulation and eccentric single leg control. [] UE / cervical: cervical, scapular, scapulothoracic and UE control with self care, reaching, carrying, lifting, house/yardwork, driving, computer work.   [] (06711) Therapist is in constant attendance of 2 or more patients providing skilled therapy interventions, but not providing any significant amount of measurable one-on-one time to either patient, for improvements in  [] LE / lumbar: LE, proximal hip, and core control in self care, mobility, lifting, ambulation and eccentric single leg control. [] UE / cervical: cervical, scapular, scapulothoracic and UE control with self care, reaching, carrying, lifting, house/yardwork, driving, computer work. NMR and Therapeutic Activities:    [x] (31123 or 80064) Provided verbal/tactile cueing for activities related to improving balance, coordination, kinesthetic sense, posture, motor skill, proprioception and motor activation to allow for proper function of   [x] LE: / Lumbar core, proximal hip and LE with self care and ADLs  [] UE / Cervical: cervical, postural, scapular, scapulothoracic and UE control with self care, carrying, lifting, driving, computer work.   [] (22712) Gait Re-education- Provided training and instruction to the patient for proper LE, core and proximal hip recruitment and positioning and eccentric body weight control with ambulation re-education including up and down stairs     Home Management Training / Self Care:  [] (75601) Provided self-care/home management training related to activities of daily living and compensatory training, and/or use of adaptive equipment for improvement with: ADLs and compensatory training, meal preparation, safety procedures and instruction in use of adaptive equipment, including bathing, grooming, dressing, personal hygiene, basic household cleaning and chores.      Home Exercise Program:    [] (46961) Reviewed/Progressed HEP activities related to strengthening, flexibility, endurance, ROM of   [] LE / Lumbar: core, proximal hip and LE for functional self-care, mobility, lifting and ambulation/stair navigation   [] UE / Cervical: cervical, postural, scapular, scapulothoracic and UE control with self care, reaching, carrying, lifting, house/yardwork, driving, computer work  [] (25241)Reviewed/Progressed HEP activities related to improving balance, coordination, kinesthetic sense, posture, motor skill, proprioception of   [] LE: core, proximal hip and LE for self care, mobility, lifting, and ambulation/stair navigation    [] UE / Cervical: cervical, postural,  scapular, scapulothoracic and UE control with self care, reaching, carrying, lifting, house/yardwork, driving, computer work    Manual Treatments:  PROM / STM / Oscillations-Mobs:  G-I, II, III, IV (PA's, Inf., Post.)  [x] (92494) Provided manual therapy to mobilize LE, proximal hip and/or LS spine soft tissue/joints for the purpose of modulating pain, promoting relaxation,  increasing ROM, reducing/eliminating soft tissue swelling/inflammation/restriction, improving soft tissue extensibility and allowing for proper ROM for normal function with   [x] LE / lumbar: self care, mobility, lifting and ambulation. [] UE / Cervical: self care, reaching, carrying, lifting, house/yardwork, driving, computer work. Modalities:  [] (64354) Vasopneumatic compression: Utilized vasopneumatic compression to decrease edema / swelling for the purpose of improving mobility and quad tone / recruitment which will allow for increased overall function including but not limited to self-care, transfers, ambulation, and ascending / descending stairs.        Charges:  Timed Code Treatment Minutes: 70   Total Treatment Minutes: 70     [] EVAL - LOW (84462)   [] EVAL - MOD (24056)  [] EVAL - HIGH (50871)  [] RE-EVAL (88656)  [] NT(67572) x   1    [] Ionto  [] NMR (36719) x [] Vaso  [x] Manual (21118) x   2   [] Ultrasound  [x] TA x   2    [] Mech Traction (01188)  [] Aquatic Therapy x     [] ES (un) (14275):   [] Home Management Training x  [] ES(attended) (99021)   [] Dry Needling 1-2 muscles (74189):  [] Dry Needling 3+ muscles (894928  [] Group:      [x] Other: gaitx1    GOALS:  Patient stated goal: \"Pt wants to be able to stand with his walker and walk household distances\"  [x] Progressing: [] Met: [] Not Met: [] Adjusted     Therapist goals for Patient:   Short Term Goals: To be achieved in: 2 weeks  1. Independent in HEP and progression per patient tolerance, in order to prevent return of swelling   [] Progressing: [x] Met: [] Not Met: [] Adjusted  2. Patient will have a decrease in swelling/pain to facilitate improvement in movement, function, and ADLs as indicated by improvement with LLIS. [] Progressing: [x] Met: [] Not Met: [] Adjusted     Long Term Goals: To be achieved in: 6 weeks  1. FOTO score of at least 46 to assist with reaching prior level of function. [] Progressing: [x] Met: [] Not Met: [] Adjusted  2. Patient will demonstrate increased AROM/strength of BLE to 4/5 so that pt can resume walking and standing without increase in symptoms. [x] Progressing: [] Met: [] Not Met: [] Adjusted  3. Decrease swelling of BLEs by 5cm so that pt can return to functional activities including standing, walking, squatting, and lifting without increased symptoms or restriction. [x] Progressing: [x] Met: MET RLE [] Not Met: [] Adjusted  4. Patient will be able to stand for >/= 5 minutes in order to improve standing tolerance for performing ADLs. [x] Progressing: [] Met: [] Not Met: [] Adjusted  5. Patient will be able to ambulate short community distances (300-500ft) with the LRAD to improve functional mobility in his home and community.    [x] Progressing: [] Met: [] Not Met: [] Adjusted  NEW GOAL SET 10/6 reduce swelling so that pt can wear shoes with appropriate LE compression without increased symptoms. [] Progressing: [] Met: [] Not Met: [] Adjusted  NEW GOAL SET 10/6 increase stand daisy to 5-10 min with minimal to no intermittent  UE support. [] Progressing: [] Met: [] Not Met: [] Adjusted  NEW GOAL SET: pt to be independent with advanced HEP to appropriately manage lymphedema - to include don/doff advanced lymphedema pump with minimal to no assist of staff at [facility]. [] Progressing: [] Met: [] Not Met: [] Adjusted    Overall Progression Towards Functional goals/ Treatment Progress Update:  [x] Patient is progressing as expected towards functional goals listed. [] Progression is slowed due to complexities/Impairments listed. [] Progression has been slowed due to co-morbidities. [] Plan just implemented, too soon to assess goals progression <30days   [] Goals require adjustment due to lack of progress  [] Patient is not progressing as expected and requires additional follow up with physician  [] Other    Persisting Functional Limitations/Impairments:  []Sleeping []Sitting               [x]Standing [x]Transfers        [x]Walking [x]Kneeling               [x]Stairs [x]Squatting / bending   [x]ADLs []Reaching  [x]Lifting  [x]Housework  []Driving []Job related tasks  []Sports/Recreation []Other:        ASSESSMENT:  10/6 pt cont to progress well - increased daisy of walking and LE ex today. Set new goals for cont progression with tx of lymphedema. Improved swelling - worst swelling now at toes and dorsal feet R>L-- added trial of tensoshape to feet and toes today to address this. Requested RNs at [address] add increased gradient compression at toes and feet    10/4 POC completed this date. Pt presents with decreased edema in BLE compared to last PN and eval. Pt with wounds healed on RLE. Pt continues with edema in B LE, especially feet and ankles.  Increased edema in LLE compared to LV as pt was unwrapped for about 1.5 days due to report of nursing staff at his home reporting they are only wrapping RLE as it was followed by wound care. Pt provided with note from PT requesting BLE bandages and both bandages wrapped distal to proximal. Pt will benefit from additional OP PT to further decrease edema in BLE and increase focus on functional mobility. 9/29 excellent response to the flexitouch trial with [facility] from [facility] on 9/23/22. Overall pt's LE cont to improve. Pt would greatly benefit for improved consistency with appropriate wrapping - with appropriate compression gradient, cleaning of LE at his facility. Pt will benefit form regular use of advanced lymph pump. He might benefit form trial of circaids for feet and LE as they would be easier to apply and should be adequate to provide approp compression. 9/27 Pt further educated on importance of stretching R knee into ext as he continues to lack full knee ext with amb. Completed skin care this date. Pt with decreased skin dryness and skin irritation compared to previous visit. Pt has been complaint with over 4 weeks of compression, elevation, and exercise, and edema in BLE and trunk remains.  Pt will benefit from advanced lymphedema pump due to truncal swelling and increased abdominal measurement following basic pump trial.     Per [facility] from [facility] lymphedema pump trial:    Patient trialed the basic pump for about 15 mins @ 30mmHg pressure on the right leg, below are the results:     Before/ after/ difference:   Abdomen: 134.0cm/ 134.1cm/ +0.1cm   Thigh: 60.0cm/ 60.0cm/ +/- 0cm  Knee: 54.1cm/ 53.8cm/ -0.30cm  Calf: 41.9cm/ 41.9cm/ +/- 0cm  Ankle: 38.7cm/ 38.6cm/ -0.1cm     Patient then trialed the [address] on the right leg and abdomen at normal pressure for about 15 minutes, below are the results:     Before/ after/ difference:  Abdomen: 134.1cm/ 133.7cm/ -0.4cm  Thigh: 60.0 cm/ 59.1cm/ -0.9cm  Knee: 53.8cm/ 53.7cm/ -0.1cm  Calf: 41.9cm/ 40.8cm/ -1.1cm   Ankle: 38.6cm/ 38.3cm/ -0.3cm    9/22 pt appears to have abdominal swelling. If this persists, he would benefit from advanced lymph pump to include abdomen  pt feels that staff at [facility] are trying and learning how to take care of his legs. Pt advocating for himself as best as he can and receptive to instruction to cont to do so. Pt cont to benefit form compression , walking MLD. Will benefit greatly form lymph pump to use daily/most days    9/20 Pt with skin irritation around bandage on R dorsum of foot. Contacted staff at [facility] this date regarding appropriate skin care and bandaging. See above. Pt provided with note regarding distal to proximal compression and daily skin care. Continue with MLD, compression, and addition of home lymphedema pump to further decrease edema and fibrotic tissue. 9/15 New wound R ant shin: [id number] scab surrounded by [id number] red area. Increased pitting edema today - attribute this to staff at [facility] no longer wrapping his LE daily. Pt very frustrated by decreased wound and lymphedema care. Pt talking to head RN about it . Gave pt contact info for [facility] but advised pt to be careful how he approaches this   States he talked to Carolyn Coleman director of nursing on 9/13 regarding new wound care nurse - she told pt she would check into everything - daily wound care and daily wrapping. Pt reports he hasn't heard back from her.    9/13 Washed BLE and applied lotion as pt with dry skin on B lower legs and feet, R LE > L LE. Provided notes for nursing staff at pt's home to wash and moisturize legs daily and wrap prior to NV to allow PT to assess bandaging technique. Pt provided with phone number for [facility] medical to call regarding lymphedema pump trial as he has not been in contact with them recently. Continue with MLD, exercise, compression, and skin care to decrease edema and improve functional mobility. 9/8 pt needs assist with consistent and excellent skin care and daily bandaging.  Staff turnover/changes at his facility has negatively impacted the health of his skin     9/6 swelling B LE much improved except significant swelling of feet and toes cont to be a problem R>L. Continues with wound R dorsal foot. Lymph pump should help prevent future wounds - pt reports hx of wounds on LE and infections since 2017. trial of  bandaging with extra foam today - to cover larger area at ankles and entire dorsum of foot - including dorsal toes    9/1 PN completed this date. Pt with significant decrease in edema in RLE this date with only 1 wound on dorsal ankle crease. Slight decreased edema in LLE. Pt with improved skin health with decreased skin dryness. Educated to continue with moisturizing, bandaging, and exercise to further decrease edema in B LE's and improve functional mobility. Treatment/Activity Tolerance:  [x] Patient able to complete tx [] Patient limited by fatigue  [] Patient limited by pain  [] Patient limited by other medical complications  [] Other:     Prognosis: [x] Good [] Fair  [] Poor    Patient Requires Follow-up: [x] Yes  [] No    Plan for next treatment session:   MLD, compression - bandage B LE below knee , HEP, pt education, lymph pump  B LE, lymph pump for home to help prevent chronic wounds R>L LE, exercise to stimulate lymphatic flow, LE ex, balance, gait, fxnl mobility    PLAN: See eval. PT 2x / week for 6 weeks. + 2x/wk for 6 wks   [x] Continue per plan of care [] Alter current plan (see comments)  [] Plan of care initiated [] Hold pending MD visit [] Discharge    Electronically signed by: Florentin Sheridan, PT, DPT      Note: If patient does not return for scheduled/ recommended follow up visits, this note will serve as a discharge from care along with most recent update on progress.

## 2022-10-11 ENCOUNTER — HOSPITAL ENCOUNTER (OUTPATIENT)
Dept: PHYSICAL THERAPY | Age: 77
Setting detail: THERAPIES SERIES
Discharge: HOME | End: 2022-10-11
Payer: COMMERCIAL

## 2022-10-11 PROCEDURE — 97140 MANUAL THERAPY 1/> REGIONS: CPT

## 2022-10-11 PROCEDURE — 97530 THERAPEUTIC ACTIVITIES: CPT

## 2022-10-11 NOTE — FLOWSHEET NOTE
[facility] - Outpatient Physical Therapy           Physical Therapy Daily LYMPHEDEMA Treatment Note    Date:  10/11/2022    Patient Name:  Sree Urias   \"JANELLE\"  :  1945  MRN: [id number]  Restrictions/Precautions:    Medical/Treatment Diagnosis Information:  Diagnosis: BILATERAL LOWER EXTREMITY LYMPHEDEMA  Treatment Diagnosis: B LE chronic swelling and wounds R > L and decreased B LE strength, decreased functional mobilityPT diagnosis:  Insurance/Certification information:  PT Insurance Information: [address] [place] [address] - no auth/no copay/BMN  Physician Information:  Esaw Marker, NP    Plan of care signed (Y/N): []  Yes [x]  No     Date of Patient follow up with Physician:       Progress Report: []  Yes  [x]  No     Date Range for reporting period:  Beginnin22  PT POC 22 - faxed for signature  PN   PN   POC 10/4  Ending    Progress report due (10 Rx/or 30 days whichever is less): visit #10 or      Recertification due (POC duration/ or 90 days whichever is less): 10/21/22    Visit # Insurance Allowable Auth required? Date Range    +  +  BMN []  Yes  [x]  No pcy        Latex Allergy:  [x]NO      []YES  Preferred Language for Healthcare:   [x]English       []other:      Functional Scale:                                                                                                      Date assessed:  FOTO physical FS primary measure score = 39; risk adjusted = 47                  22  FOTO physical FS primary measure score = 46                 22  FOTO physical FS primary measure score = 47                 22  FOTO physical FS primary measure score = 55                 10/6/22  *COMPLETE NV*     Pain level:  0/10  B LE    SUBJECTIVE:  Pt reports someone from [employer] is coming to train nursing staff at [address] [place] [address] Road on the pump. States he took bandages off last night. OBJECTIVE: 10/6 pt wearing wraps.   Reports RNs washed and lotioned his LE yesterday. GIRTH MEASUREMENT  (Tape on skin along anterior tibialis)     Lower Extremity Right (cm) Left  (cm) Right (cm) Left (cm) Right (cm) Left (cm)  Right (cm) Left (cm)    Date 6/30/22 6/30/22 8/2 8/2 9/1 9/1 9/22 10/4 10/4   Measurements taken as follows: 0 cm at inf aspect of lateral malleolus and marked on    [] Ant aspect of LE    [x] Lateral aspect of LE    [] sheet                             cm  Widest at hip              cm at waist (at umbilicus), measured while reclined on hospital bed 132.0 cm       134.5 cm on exhale  135.0 cm on inhale    While reclined on hospital bed                    Metatarsal heads 25.7cm 25.7cm 26.1 25.5 25.0 25.5  25.1 26.8   0 Cm above inf aspect of  LATERAL MALLEOLUS 37.8 35.5 cm 34.9 32.5 35.2 33.9  32.3 33.0    10 Cm above inf aspect of  LATERAL MALLEOLUS    38.7 33.4cm 39.1 30.6 31.9 32.0  27.0 31.7   20 Cm above  inf aspec of LATERAL MALLEOLUS 45.3 38.5 44.4 39.8 34.8 39.0  31.8 37.5   30 Cm above  inf aspect of  LATERAL MALLEOLUS 46.4 41.4 45.5 46.8 39.7 42.0  38.4 40.7                  Total Girth 193.9 174.5 190.0 175.2 166.6  172.4  154.6 cm  169.7 cm     10/4   LLE total girth decreased by 2.7 cm compared to 9/1 and 4.8 cm compared to eval   LLE total girth decreased by 12.0 cm compared to 9/1 and 39.3 cm compared to eval     9/20 skin irritation on dorsum of R foot around border of wound care bandage    9/15 pt to clinic without wraps on - states he removed them yesterday bc they were falling off and staff did not come when requested to put back on. Increased pitting edema today. New wound R ant shin: [id number] scab surrounded by [id number] red area. States he talked to Lillie director of nursing on 9/13 regarding new wound care nurse - she told pt she would check into everything - daily wound care and daily wrapping.  Pt reports he hasn't heard back from her.    9/8 pt's LEs B  not washed since Kaley PT washed them on 9/1 during PT session. Wound dorsal R foot: 1.3cmx 1.8cm. red-tiarra [place] drainage; red rectangle of skin near/around wound   9/6 Pt reports hx of wounds on LE and infections since 201 9/1  LLE total girth decreased by 2.8 cm compared to 8/2  RLE total girth decreased by 23.4 cm compared to 8/2 8/30 small opening on R foot. Decreased edema B lower legs with increased edema at knees. 8/16/22: observe:    L LE: increased swelling at dorsum of foot and toes. Decreased swelling ankle to below knee. Foam rectangle at ankle reduces constriction  at ankle  R LE: increased swelling, increased redness and increased warmth compared to L LE. Concern re possible cellulitis. PT called RN at pt's living facility - see below  Deferred CKC ex due to possible cellulitis R LE      8/9 overall reduction in swelling and wounds healing. Distal swelling present at R foot and toes - needs improved compression gradient with bandages  NEW Blister opened R ant/lat shin: 1.3cm x 0.9cm. new red area R dorsal foot: 2\" x 1.5\" (Not open). PT cleaned opened blister area with alcohol wipe and covered with tegaderm - communicated to pt's RN on communication form    8/2    RLE decreased total girth by 3.9 cm   LLE increased total girth by 0.7 cm     7/21   Pt to clinic wearing 2 tensoshape each LE from mid foot to below knee. Signs of constriction present B LE ant ankle and R LE below knee - inappropriate compression gradient. Dorsal foot swelling increased B - due to constriction at ankles from rolled and doubled tensoshapes  causing constriction  multiple new wounds present B LEs:  L medial calf: 1.5 x 0.8cm, red area L ant ankle: 2.7cnx2.0 cm; R ant shin: 1.7cmx1.7cm, R ant ankle: not yet open but very red: 3.5cmx2.5 with black area in center of red area: 1.0cm x 0.9cm  Redness present B dorsal feet and R LE below knee. Increased warmth R LE compared to L. Concern for possible cellulitis.      RESTRICTIONS/PRECAUTIONS: Exercises/Interventions:     Therapeutic Exercises (19449) Resistance / level Sets/sec Reps Notes   Nu step See below      Pt educated on exercises to stimulate lymphatic flow        CKC LE ex at // bars or counter       Ankle pumps  Toe DF/PF  SAQ  Gastroc stretch with active DF   T/o session                 Therapeutic Activities (59805)  (Dynamic activities such as compression, designed to improve functional performance)       Transfers:  sit to/from stand from  and hospital bed   VC for knee, hip, and trunk ext    Applied dressing to wound: PT cleaned area with alcohol wipe and covered with bandage      Compression: trial tensoshape size   applied to B LE - XL   7/15 Educated to follow up with wound care nurse when he returns home due to weeping and need for dressing change. Pt states he is on oral antibiotic    10/11 pt education on lymphedema pump. Discussed completing morning or night and unwrapping prior and wrapping after use of lymphedema pump. X 5 min      Multilayer compression bandaging to BLE  Stockinette    Large Foam rectangle at dorsum of B feet and dorsal toes   - held this date due to wounds    2x12 cm low stretch compression bandage    7/29 unwrapped lower extremities. Discussed increased compression at foot and wrapping distal to proximal. Wrote notes for pt's nurse. 8/5, 8/2 unwrapped. Educated to increase compression proximally   9/20, 8/26 unwrapped LE's - educated to increase compression at feet and ankles and to wrap to just under knee. Notes written for pt's nurse     POC completed 10/4 - objective measures taken. Discussed progress made with therapy. Educated to continue with bandaging, skin care, and ambulation/HEP       X 18 min total          10/4 LLE only this date due to only RLE wrapped upon arrival. Pt forgot other bandages at home.    7/26 educated pt and Arelis (wound care nurse) on bandaging    Ambulation  95 ft + 55 ft with RW and CGA/SBA    9/8 deferred due to time constrains - assisted pt to wash B LE and applied lotion    9/6 gait limited by fatigue 7/21 pt SOB after   HEP   - LLD knee ext stretch   - quad set   - ankle pump  - LAQ      Transfer w/c <> bed - SBA  Transfer sit to/form stand at wc  Transfer sit <> supine X 1   VC for knee ext    Nu step   S=11, arms =10, workload =3 7 min   Sci fit s=19, arms =6    8/16 deferred due to possible cellulitis    Wearing multilayer bandaging   Rest break     9/29, 9/27, 9/229/8, 8/30 Unwrapped B LE's. Educated on bandaging up to knee crease. Washed B lower legs with gentle soap and applied lotion. Educated on importance of skin health and keeping legs clean and moisturized. 9/15, 9/13 Washed B lower legs with gentle soap and applied lotion. Educated on importance of skin health and keeping legs clean and moisturized. Wrote notes for Pt's nurse.  Provided with phone number for [facility] medical to obtain home lymphedema pump as he has not been in contact with them recently                         9/13 education partially completed during MLD    Home Management  (providing pt education on safety procedures/instructions)       Pt educated on compression as follows:   how to appropriately apply and wear compression  how to maintain appropriate gradient compression  do not sleep with compression garment/tensoshape  signs and symptoms of constriction   when to remove compression       Pt educated on lymphedema prevention and management    7/12 completed during MLD    Pt educated on MLD                            Neuromuscular Re-ed (50138)       balance                     Manual Intervention (48158)      See MLD flowchart below   X 40 min   7/21 deferred due to concern re possible cellulitis                                        Manual Lymph Drainage (MLD):  MLD to B LE , clearing along alternate pathway of  Ipsilateral trunk  to  Ipsilateral axillary  lymph nodes    Clear Nodes 10x each   Neck x   [address]    Axilla x   Abdomen x Groin x   Popliteal x2 x   Clear Alternate Pathway 10x each   Re-clear alternate pathway   x5 each position x   Location Ipsilateral trunk       Fluid Mobilization 10x each   Re-clear alternate pathway   x5 each position x   Shoulder bracing    Location B LE and ipsilateral trunk       Protein Resorption 10x each   Location LE below knee       Clear Foot/Ankle or Hand 10x each   Achilles x   Bilateral malleolus x   Fan the cards x   Clear dorsum x   Clear through web space x   Clear toes/fingers x   Fluid mobilization x   Re-clear all positions  X5 each x       Modalities:     OTHER:   10/6 note to [employer]:  Requested RNs at [facility] add increased gradient compression at toes and feet  9/29 PT sent another note to pt's facility re please re-wrap, lotion and wash LE daily. ,  asked staff to use new tape when re-wrapping.       9/22 pt ed re how to advocate for himself for LE washing and bandaging - pt agreeable. D/w pt PT will call [facility] point during next session if needed. 9/20 Cyrus Poe, PT, DPT contacted [employer] and spoke with Juancarlos Green RN regarding need for compression daily when lower extremities are in a dependent position. Phone call transferred to Petty Zarate PT, DPT who spoke with wound care nurse, [place] regarding pt's compression. Discussed need for skin care and bandaging daily with gradient compression wrapping from distal to proximal from toes to just below knee. [place] states this is the current POC. PT provided pt with note requesting daily skin care and compression distal to proximal.      9/15 gave pt written note requesting daily wound care and daily bandage changes. Advised we are happy to teach facility staff how to wrap. Advised pt to contact [facility] to schedule pump demo. Advised pt to look into should he request (and pay for ) additional 1-2 units of ADLs assist a day so staff can/will wrap his legs?  Pt is agreeable    9/8 gave pt handout for RN at facility requesting pt get daily assist with lower body bathing, application of lotion to LE and bandaging of LE    9/6 gave pt handout with written bandaging directions fr new RN at facility    8/16/22 1121am PT called Mone Riley director of nursing next at [phone] re pt- discussed concern re possible new onset cellulitis R  LE. She will call pt;'s MD re possible start of antibiotics for cellulitis. 7/21 pt signed release of info form for PT to communicate with medical staff at [facility] (the [facility] he [facility])     7/21 PT called Jose Roberto Merritt NP  re pt- discussed concern re possible new onset cellulitis R and or L LE, new onset wounds, inappropriate compression gradient with 2 tensoshapes that rolled. [facility]  states she will call in antibiotic. At pt request, PT called SHANIA Vo [phone]; unable to LM due to VM full. Called Lisa director of nursing next at [phone] - no answer, no VM. PT wrote note to RNs a pt's independent living facility re need for approp compression gradient and pt will order lymphedema bandages - low stretch 2x8cm, 4x12 cm, 2x15 cm artiflex. 6/30 pt signed release of info for PT to send PT notes and insurance info to [facility] re possibility of getting lymphedema pump for B LE    Pt education:   9/22, 9/8 pt ed on approp skin care and reveiwed re approp bandaging  8/9 review HEP - see below d/w pt benefit of rewrapping daily, how to void constriction marks and gradually increasing walking duration/distance- wrote this on his note to RN  7/21 extensive pt ed re appropriate compression and compression gradient, need to avoid constriction    6/30   pt ed and provided with form on what compression wrappings to purchase. Ed on ankle pumps and toe flx/ext to stimulate lymphatic flow. Pt educated on pathology and anatomy of etiology of lymphedema, condition precautions, indications for long term prognosis.     Pt was educated on diagnosis; prognosis; PT POC including MLD, compression (role of multilayer bandaging/ compression garments), lymphedema management/prevention of flare ups, role of exercise, HEP, lymphedema pump; expectations for rehab. All pt questions were answered. HEP instruction:  9/6 extra foam under wraps    8/9 increase duration of walks by 15-60 sec; goal - increase walk duration so he can do 1-2 laps at facility. Re-wrap daily as able- trial of grey foam at ankles    7/26   Access Code: [email or web address]  URL: [email or web address]  Date: 07/26/2022  Prepared by: Keren Balderas    Exercises  Long Sitting Quad Set - 3 x daily - 7 x weekly - 1 sets - 10 reps - 5 sec hold  Supine Knee Extension Stretch on Towel Roll - 3 x daily - 7 x weekly - 5-6 min hold    7/21 pt to order low stretch compression bandages: 2x8cm, 4x12 cm and artiflex 2x15 cm and bring to next appt. Encouraged pt to try to plan for a staff member to come to appt to be instructed in lymphedema wrapping and approp comrpession gradient    Eval: Pt instructed in lymphedema management/prevention concepts and swipe method of MLD, ankle pumps, toe DF/PF    Therapeutic Exercise and NMR EXR  [] (81438) Provided verbal/tactile cueing for activities related to strengthening, flexibility, endurance, ROM for improvements in  [] LE / Lumbar: LE, proximal hip, and core control with self care, mobility, lifting, ambulation. [] UE / Cervical: cervical, postural, scapular, scapulothoracic and UE control with self care, reaching, carrying, lifting, house/yardwork, driving, computer work.  [] (93232) Provided verbal/tactile cueing for activities related to improving balance, coordination, kinesthetic sense, posture, motor skill, proprioception to assist with   [] LE / lumbar: LE, proximal hip, and core control in self care, mobility, lifting, ambulation and eccentric single leg control.    [] UE / cervical: cervical, scapular, scapulothoracic and UE control with self care, reaching, carrying, lifting, house/yardwork, driving, computer work.   [] (63450) Therapist is in constant attendance of 2 or more patients providing skilled therapy interventions, but not providing any significant amount of measurable one-on-one time to either patient, for improvements in  [] LE / lumbar: LE, proximal hip, and core control in self care, mobility, lifting, ambulation and eccentric single leg control. [] UE / cervical: cervical, scapular, scapulothoracic and UE control with self care, reaching, carrying, lifting, house/yardwork, driving, computer work. NMR and Therapeutic Activities:    [x] (76076 or 36704) Provided verbal/tactile cueing for activities related to improving balance, coordination, kinesthetic sense, posture, motor skill, proprioception and motor activation to allow for proper function of   [x] LE: / Lumbar core, proximal hip and LE with self care and ADLs  [] UE / Cervical: cervical, postural, scapular, scapulothoracic and UE control with self care, carrying, lifting, driving, computer work.   [] (62650) Gait Re-education- Provided training and instruction to the patient for proper LE, core and proximal hip recruitment and positioning and eccentric body weight control with ambulation re-education including up and down stairs     Home Management Training / Self Care:  [] (51145) Provided self-care/home management training related to activities of daily living and compensatory training, and/or use of adaptive equipment for improvement with: ADLs and compensatory training, meal preparation, safety procedures and instruction in use of adaptive equipment, including bathing, grooming, dressing, personal hygiene, basic household cleaning and chores.      Home Exercise Program:    [] (44583) Reviewed/Progressed HEP activities related to strengthening, flexibility, endurance, ROM of   [] LE / Lumbar: core, proximal hip and LE for functional self-care, mobility, lifting and ambulation/stair navigation   [] UE / Cervical: cervical, postural, scapular, scapulothoracic and UE control with self care, reaching, carrying, lifting, house/yardwork, driving, computer work  [] (02453)Reviewed/Progressed HEP activities related to improving balance, coordination, kinesthetic sense, posture, motor skill, proprioception of   [] LE: core, proximal hip and LE for self care, mobility, lifting, and ambulation/stair navigation    [] UE / Cervical: cervical, postural,  scapular, scapulothoracic and UE control with self care, reaching, carrying, lifting, house/yardwork, driving, computer work    Manual Treatments:  PROM / STM / Oscillations-Mobs:  G-I, II, III, IV (PA's, Inf., Post.)  [x] (89341) Provided manual therapy to mobilize LE, proximal hip and/or LS spine soft tissue/joints for the purpose of modulating pain, promoting relaxation,  increasing ROM, reducing/eliminating soft tissue swelling/inflammation/restriction, improving soft tissue extensibility and allowing for proper ROM for normal function with   [x] LE / lumbar: self care, mobility, lifting and ambulation. [] UE / Cervical: self care, reaching, carrying, lifting, house/yardwork, driving, computer work. Modalities:  [] (72843) Vasopneumatic compression: Utilized vasopneumatic compression to decrease edema / swelling for the purpose of improving mobility and quad tone / recruitment which will allow for increased overall function including but not limited to self-care, transfers, ambulation, and ascending / descending stairs.        Charges:  Timed Code Treatment Minutes: 82   Total Treatment Minutes: 82     [] EVAL - LOW (91547)   [] EVAL - MOD (55116)  [] EVAL - HIGH (13713)  [] RE-EVAL (34588)  [] AV(35868) x   1    [] Ionto  [] NMR (05411) x       [] Vaso  [x] Manual (33180) x   3   [] Ultrasound  [x] TA x  2    [] Mech Traction (52941)  [] Aquatic Therapy x     [] ES (un) (77261):   [] Home Management Training x  [] ES(attended) (06653)   [] Dry Needling 1-2 muscles (23944):  [] Dry Needling 3+ muscles (612500  [] Group:      [] Other: gaitx1    GOALS:  Patient stated goal: \"Pt wants to be able to stand with his walker and walk household distances\"  [x] Progressing: [] Met: [] Not Met: [] Adjusted     Therapist goals for Patient:   Short Term Goals: To be achieved in: 2 weeks  1. Independent in HEP and progression per patient tolerance, in order to prevent return of swelling   [] Progressing: [x] Met: [] Not Met: [] Adjusted  2. Patient will have a decrease in swelling/pain to facilitate improvement in movement, function, and ADLs as indicated by improvement with LLIS. [] Progressing: [x] Met: [] Not Met: [] Adjusted     Long Term Goals: To be achieved in: 6 weeks  1. FOTO score of at least 46 to assist with reaching prior level of function. [] Progressing: [x] Met: [] Not Met: [] Adjusted  2. Patient will demonstrate increased AROM/strength of BLE to 4/5 so that pt can resume walking and standing without increase in symptoms. [x] Progressing: [] Met: [] Not Met: [] Adjusted  3. Decrease swelling of BLEs by 5cm so that pt can return to functional activities including standing, walking, squatting, and lifting without increased symptoms or restriction. [x] Progressing: [x] Met: MET RLE [] Not Met: [] Adjusted  4. Patient will be able to stand for >/= 5 minutes in order to improve standing tolerance for performing ADLs. [x] Progressing: [] Met: [] Not Met: [] Adjusted  5. Patient will be able to ambulate short community distances (300-500ft) with the LRAD to improve functional mobility in his home and community. [x] Progressing: [] Met: [] Not Met: [] Adjusted  NEW GOAL SET 10/6 reduce swelling so that pt can wear shoes with appropriate LE compression without increased symptoms. [] Progressing: [] Met: [] Not Met: [] Adjusted  NEW GOAL SET 10/6 increase stand daisy to 5-10 min with minimal to no intermittent  UE support.   [] Progressing: [] Met: [] Not Met: [] Adjusted  NEW GOAL SET: pt to be independent with advanced HEP to appropriately manage lymphedema - to include don/doff advanced lymphedema pump with minimal to no assist of staff at [facility]. [] Progressing: [] Met: [] Not Met: [] Adjusted    Overall Progression Towards Functional goals/ Treatment Progress Update:  [x] Patient is progressing as expected towards functional goals listed. [] Progression is slowed due to complexities/Impairments listed. [] Progression has been slowed due to co-morbidities. [] Plan just implemented, too soon to assess goals progression <30days   [] Goals require adjustment due to lack of progress  [] Patient is not progressing as expected and requires additional follow up with physician  [] Other    Persisting Functional Limitations/Impairments:  []Sleeping []Sitting               [x]Standing [x]Transfers        [x]Walking [x]Kneeling               [x]Stairs [x]Squatting / bending   [x]ADLs []Reaching  [x]Lifting  [x]Housework  []Driving []Job related tasks  []Sports/Recreation []Other:        ASSESSMENT:    10/11 Pt demonstrates improve posture with amb requiring less frequent cues for upright trunk and knee ext. Pt continues to sit on bed without bringing Ue's to bed. Discussed completing safe transfers at home and in clinic with appropriate hand placement. Pt with all wounds healed. Did not use horseshoe at B ankle as no deep skin folds notes. Continued to use foam on dorsum of feet. Continue with MLD, compression, and exercise to decrease edema and improve functional mobility. 10/6 pt cont to progress well - increased daisy of walking and LE ex today. Set new goals for cont progression with tx of lymphedema. Improved swelling - worst swelling now at toes and dorsal feet R>L-- added trial of tensoshape to feet and toes today to address this. Requested RNs at [facility] point add increased gradient compression at toes and feet    10/4 POC completed this date.  Pt presents with decreased edema in BLE compared to last PN and eval. Pt with wounds healed on RLE. Pt continues with edema in B LE, especially feet and ankles. Increased edema in LLE compared to LV as pt was unwrapped for about 1.5 days due to report of nursing staff at his home reporting they are only wrapping RLE as it was followed by wound care. Pt provided with note from PT requesting BLE bandages and both bandages wrapped distal to proximal. Pt will benefit from additional OP PT to further decrease edema in BLE and increase focus on functional mobility. 9/29 excellent response to the flexitouch trial with Javier Bangura from [facility] on 9/23/22. Overall pt's LE cont to improve. Pt would greatly benefit for improved consistency with appropriate wrapping - with appropriate compression gradient, cleaning of LE at his facility. Pt will benefit form regular use of advanced lymph pump. He might benefit form trial of circaids for feet and LE as they would be easier to apply and should be adequate to provide approp compression. 9/27 Pt further educated on importance of stretching R knee into ext as he continues to lack full knee ext with amb. Completed skin care this date. Pt with decreased skin dryness and skin irritation compared to previous visit. Pt has been complaint with over 4 weeks of compression, elevation, and exercise, and edema in BLE and trunk remains.  Pt will benefit from advanced lymphedema pump due to truncal swelling and increased abdominal measurement following basic pump trial.     Per Javier Bangura from [facility] lymphedema pump trial:    Patient trialed the basic pump for about 15 mins @ 30mmHg pressure on the right leg, below are the results:     Before/ after/ difference:   Abdomen: 134.0cm/ 134.1cm/ +0.1cm   Thigh: 60.0cm/ 60.0cm/ +/- 0cm  Knee: 54.1cm/ 53.8cm/ -0.30cm  Calf: 41.9cm/ 41.9cm/ +/- 0cm  Ankle: 38.7cm/ 38.6cm/ -0.1cm     Patient then trialed the Flexiotuch on the right leg and abdomen at normal pressure for about 15 minutes, below are the results:     Before/ after/ difference:  Abdomen: 134.1cm/ 133.7cm/ -0.4cm  Thigh: 60.0 cm/ 59.1cm/ -0.9cm  Knee: 53.8cm/ 53.7cm/ -0.1cm  Calf: 41.9cm/ 40.8cm/ -1.1cm   Ankle: 38.6cm/ 38.3cm/ -0.3cm    9/22 pt appears to have abdominal swelling. If this persists, he would benefit from advanced lymph pump to include abdomen  pt feels that staff at [facility] are trying and learning how to take care of his legs. Pt advocating for himself as best as he can and receptive to instruction to cont to do so. Pt cont to benefit form compression , walking MLD. Will benefit greatly form lymph pump to use daily/most days    9/20 Pt with skin irritation around bandage on R dorsum of foot. Contacted staff at [facility] this date regarding appropriate skin care and bandaging. See above. Pt provided with note regarding distal to proximal compression and daily skin care. Continue with MLD, compression, and addition of home lymphedema pump to further decrease edema and fibrotic tissue. 9/15 New wound R ant shin: [id number] scab surrounded by [id number] red area. Increased pitting edema today - attribute this to staff at [facility] no longer wrapping his LE daily. Pt very frustrated by decreased wound and lymphedema care. Pt talking to head RN about it . Gave pt contact info for mara but advised pt to be careful how he approaches this   States he talked to [address] [place] [address] director of nursing on 9/13 regarding new wound care nurse - she told pt she would check into everything - daily wound care and daily wrapping. Pt reports he hasn't heard back from her.    9/13 Washed BLE and applied lotion as pt with dry skin on B lower legs and feet, R LE > L LE. Provided notes for nursing staff at pt's home to wash and moisturize legs daily and wrap prior to NV to allow PT to assess bandaging technique.  Pt provided with phone number for [facility] medical to call regarding lymphedema pump trial as he has not been in contact with them recently. Continue with MLD, exercise, compression, and skin care to decrease edema and improve functional mobility. 9/8 pt needs assist with consistent and excellent skin care and daily bandaging. Staff turnover/changes at his facility has negatively impacted the health of his skin     9/6 swelling B LE much improved except significant swelling of feet and toes cont to be a problem R>L. Continues with wound R dorsal foot. Lymph pump should help prevent future wounds - pt reports hx of wounds on LE and infections since 2017. trial of  bandaging with extra foam today - to cover larger area at ankles and entire dorsum of foot - including dorsal toes    9/1 PN completed this date. Pt with significant decrease in edema in RLE this date with only 1 wound on dorsal ankle crease. Slight decreased edema in LLE. Pt with improved skin health with decreased skin dryness. Educated to continue with moisturizing, bandaging, and exercise to further decrease edema in B LE's and improve functional mobility. Treatment/Activity Tolerance:  [x] Patient able to complete tx [] Patient limited by fatigue  [] Patient limited by pain  [] Patient limited by other medical complications  [] Other:     Prognosis: [x] Good [] Fair  [] Poor    Patient Requires Follow-up: [x] Yes  [] No    Plan for next treatment session:   MLD, compression - bandage B LE below knee , HEP, pt education, lymph pump  B LE, lymph pump for home to help prevent chronic wounds R>L LE, exercise to stimulate lymphatic flow, LE ex, balance, gait, fxnl mobility    PLAN: See eval. PT 2x / week for 6 weeks.  + 2x/wk for 6 wks   [x] Continue per plan of care [] Alter current plan (see comments)  [] Plan of care initiated [] Hold pending MD visit [] Discharge    Electronically signed by: Nadya Arriaza, PT, DPT      Note: If patient does not return for scheduled/ recommended follow up visits, this note will serve as a discharge from care along with most recent update on progress.

## 2022-10-25 ENCOUNTER — HOSPITAL ENCOUNTER (OUTPATIENT)
Dept: PHYSICAL THERAPY | Age: 77
Setting detail: THERAPIES SERIES
Discharge: HOME | End: 2022-10-25
Payer: COMMERCIAL

## 2022-10-25 NOTE — FLOWSHEET NOTE
Physical Therapy  Cancellation/No-show Note  Patient Name:  Diana Elias  :  1945   Date:  10/25/2022  Cancelled visits to date: 3  No-shows to date: 0    Patient status for today's appointment patient:  [x]  Cancelled 7/19/22 8/22/22 10/25/22  []  Rescheduled appointment  []  No-show     Reason given by patient:  [x]  Patient ill 22, 22 91/35/32  []  Conflicting appointment  []  No transportation    []  Conflict with work  []  No reason given  []  Other:     Comments:      Phone call information:   []  Phone call made today to patient at _ time at number provided:      []  Patient answered, conversation as follows:    []  Patient did not answer, message left as follows:  []  Phone call not made today  [x]  Phone call not needed - pt contacted us to cancel and provided reason for cancellation.      Electronically signed by:  Mandy Morris PT

## 2022-11-02 ENCOUNTER — HOSPITAL ENCOUNTER (OUTPATIENT)
Dept: PHYSICAL THERAPY | Age: 77
Setting detail: THERAPIES SERIES
Discharge: HOME | End: 2022-11-02
Payer: COMMERCIAL

## 2022-11-02 PROCEDURE — 97140 MANUAL THERAPY 1/> REGIONS: CPT

## 2022-11-02 PROCEDURE — 97530 THERAPEUTIC ACTIVITIES: CPT

## 2022-11-02 NOTE — FLOWSHEET NOTE
[facility] - Outpatient Physical Therapy           Physical Therapy Daily LYMPHEDEMA Treatment Note    Date:  2022    Patient Name:  Samanta Kennedy   \"JANELLE\"  :  1945  MRN: [id number]  Restrictions/Precautions:    Medical/Treatment Diagnosis Information:  Diagnosis: BILATERAL LOWER EXTREMITY LYMPHEDEMA  Treatment Diagnosis: B LE chronic swelling and wounds R > L and decreased B LE strength, decreased functional mobilityPT diagnosis:  Insurance/Certification information:  PT Insurance Information:  [facility] - no auth/no copay/BMN  Physician Information:  Carole Shen NP    Plan of care signed (Y/N): []  Yes [x]  No     Date of Patient follow up with Physician:       Progress Report: []  Yes  [x]  No     Date Range for reporting period:  Beginnin22  PT POC 22 - faxed for signature  PN   PN   POC 10/4  Ending    Progress report due (10 Rx/or 30 days whichever is less): visit #10 or      Recertification due (POC duration/ or 90 days whichever is less): 10/21/22    Visit # Insurance Allowable Auth required? Date Range    +  +  BMN []  Yes  [x]  No pcy        Latex Allergy:  [x]NO      []YES  Preferred Language for Healthcare:   [x]English       []other:      Functional Scale:                                                                                                      Date assessed:  FOTO physical FS primary measure score = 39; risk adjusted = 47                  22  FOTO physical FS primary measure score = 46                 22  FOTO physical FS primary measure score = 47                 22  FOTO physical FS primary measure score = 55                 10/6/22  *COMPLETE NV*     Pain level:  0/10  B LE    SUBJECTIVE:  Pt reports he gained back the 11 pounds he lost due to not being wrapping or using the pump.  States that over the past 2 weeks, nursing staff has not been wrapping his legs, but things have been better the last three days with bandaging and the lymphedema pump. Only brought 2 bandages this date as the remainder were wet. OBJECTIVE:   11/2 pt with increased edema in B lower legs and feet, R>L. Increased redness RLE - no warmth or pain. Constriction marks from elastic in hospital socks. 10/6 pt wearing wraps. Reports RNs washed and lotioned his LE yesterday. GIRTH MEASUREMENT  (Tape on skin along anterior tibialis)     Lower Extremity Right (cm) Left  (cm) Right (cm) Left (cm) Right (cm) Left (cm)  Right (cm) Left (cm)    Date 6/30/22 6/30/22 8/2 8/2 9/1 9/1 9/22 10/4 10/4   Measurements taken as follows: 0 cm at inf aspect of lateral malleolus and marked on    [] Ant aspect of LE    [x] Lateral aspect of LE    [] sheet                             cm  Widest at hip              cm at waist (at umbilicus), measured while reclined on hospital bed 132.0 cm       134.5 cm on exhale  135.0 cm on inhale    While reclined on hospital bed                    Metatarsal heads 25.7cm 25.7cm 26.1 25.5 25.0 25.5  25.1 26.8   0 Cm above inf aspect of  LATERAL MALLEOLUS 37.8 35.5 cm 34.9 32.5 35.2 33.9  32.3 33.0    10 Cm above inf aspect of  LATERAL MALLEOLUS    38.7 33.4cm 39.1 30.6 31.9 32.0  27.0 31.7   20 Cm above  inf aspec of LATERAL MALLEOLUS 45.3 38.5 44.4 39.8 34.8 39.0  31.8 37.5   30 Cm above  inf aspect of  LATERAL MALLEOLUS 46.4 41.4 45.5 46.8 39.7 42.0  38.4 40.7                  Total Girth 193.9 174.5 190.0 175.2 166.6  172.4  154.6 cm  169.7 cm     10/4   LLE total girth decreased by 2.7 cm compared to 9/1 and 4.8 cm compared to eval   LLE total girth decreased by 12.0 cm compared to 9/1 and 39.3 cm compared to eval     9/20 skin irritation on dorsum of R foot around border of wound care bandage    9/15 pt to clinic without wraps on - states he removed them yesterday bc they were falling off and staff did not come when requested to put back on. Increased pitting edema today.  New wound R ant shin: [id number] scab surrounded by [id number] red area. States he talked to Lillie director of nursing on 9/13 regarding new wound care nurse - she told pt she would check into everything - daily wound care and daily wrapping. Pt reports he hasn't heard back from her.    9/8 pt's LEs B  not washed since Kaley PT washed them on 9/1 during PT session. Wound dorsal R foot: 1.3cmx 1.8cm. red-tiarra Everlina Debran drainage; red rectangle of skin near/around wound   9/6 Pt reports hx of wounds on LE and infections since 201    9/1  LLE total girth decreased by 2.8 cm compared to 8/2  RLE total girth decreased by 23.4 cm compared to 8/2 8/30 small opening on R foot. Decreased edema B lower legs with increased edema at knees. 8/16/22: observe:    L LE: increased swelling at dorsum of foot and toes. Decreased swelling ankle to below knee. Foam rectangle at ankle reduces constriction  at ankle  R LE: increased swelling, increased redness and increased warmth compared to L LE. Concern re possible cellulitis. PT called RN at pt's living facility - see below  Deferred CKC ex due to possible cellulitis R LE      8/9 overall reduction in swelling and wounds healing. Distal swelling present at R foot and toes - needs improved compression gradient with bandages  NEW Blister opened R ant/lat shin: 1.3cm x 0.9cm. new red area R dorsal foot: 2\" x 1.5\" (Not open). PT cleaned opened blister area with alcohol wipe and covered with tegaderm - communicated to pt's RN on communication form    8/2    RLE decreased total girth by 3.9 cm   LLE increased total girth by 0.7 cm     7/21   Pt to clinic wearing 2 tensoshape each LE from mid foot to below knee. Signs of constriction present B LE ant ankle and R LE below knee - inappropriate compression gradient.   Dorsal foot swelling increased B - due to constriction at ankles from rolled and doubled tensoshapes  causing constriction  multiple new wounds present B LEs:  L medial calf: 1.5 x 0.8cm, red area L ant ankle: 2.7cnx2.0 cm; R ant shin: 1.7cmx1.7cm, R ant ankle: not yet open but very red: 3.5cmx2.5 with black area in center of red area: 1.0cm x 0.9cm  Redness present B dorsal feet and R LE below knee. Increased warmth R LE compared to L. Concern for possible cellulitis. RESTRICTIONS/PRECAUTIONS:     Exercises/Interventions:     Therapeutic Exercises (32537) Resistance / level Sets/sec Reps Notes   Nu step See below      Pt educated on exercises to stimulate lymphatic flow        CKC LE ex at // bars or counter       Ankle pumps  Toe DF/PF  SAQ  Gastroc stretch with active DF   T/o session                 Therapeutic Activities (03858)  (Dynamic activities such as compression, designed to improve functional performance)       Transfers:  sit to/from stand from  and hospital bed   VC for knee, hip, and trunk ext    Applied dressing to wound: PT cleaned area with alcohol wipe and covered with bandage      Compression: trial tensoshape size   applied to B LE - XL   7/15 Educated to follow up with wound care nurse when he returns home due to weeping and need for dressing change. Pt states he is on oral antibiotic    10/11 pt education on lymphedema pump. Discussed completing morning or night and unwrapping prior and wrapping after use of lymphedema pump. Multilayer compression bandaging to BLE  Stockinette    Large Foam rectangle at dorsum of B feet and dorsal toes  Artiflex   2x12 cm low stretch compression bandage    7/29 unwrapped lower extremities. Discussed increased compression at foot and wrapping distal to proximal. Wrote notes for pt's nurse. 8/5, 8/2 unwrapped. Educated to increase compression proximally   9/20, 8/26 unwrapped LE's - educated to increase compression at feet and ankles and to wrap to just under knee. Notes written for pt's nurse     POC completed 10/4 - objective measures taken. Discussed progress made with therapy.  Educated to continue with bandaging, skin health and keeping legs clean and moisturized. 9/15, 9/13 Washed B lower legs with gentle soap and applied lotion. Educated on importance of skin health and keeping legs clean and moisturized. Wrote notes for Pt's nurse.  Provided with phone number for [facility] medical to obtain home lymphedema pump as he has not been in contact with them recently                         9/13 education partially completed during MLD    Home Management  (providing pt education on safety procedures/instructions)       Pt educated on compression as follows:   how to appropriately apply and wear compression  how to maintain appropriate gradient compression  do not sleep with compression garment/tensoshape  signs and symptoms of constriction   when to remove compression       Pt educated on lymphedema prevention and management    7/12 completed during MLD    Pt educated on MLD                            Neuromuscular Re-ed (57936)       balance                     Manual Intervention (10096)      See MLD flowchart below   X 55 min   7/21 deferred due to concern re possible cellulitis                                        Manual Lymph Drainage (MLD):  MLD to B LE , clearing along alternate pathway of  Ipsilateral trunk  to  Ipsilateral axillary  lymph nodes    Clear Nodes 10x each   Neck x   [address]    Axilla x   Abdomen x   Groin x   Popliteal x2 x   Clear Alternate Pathway 10x each   Re-clear alternate pathway   x5 each position x   Location Ipsilateral trunk       Fluid Mobilization 10x each   Re-clear alternate pathway   x5 each position x   Shoulder bracing    Location B LE and ipsilateral trunk       Protein Resorption 10x each   Location LE below knee       Clear Foot/Ankle or Hand 10x each   Achilles x   Bilateral malleolus x   Fan the cards x   Clear dorsum x   Clear through web space x   Clear toes/fingers x   Fluid mobilization x   Re-clear all positions  X5 each x       Modalities:     OTHER:   10/6 note to [facility]:  Requested RNs at [facility] add increased gradient compression at toes and feet  9/29 PT sent another note to pt's facility re please re-wrap, lotion and wash LE daily. ,  asked staff to use new tape when re-wrapping.       9/22 pt ed re how to advocate for himself for LE washing and bandaging - pt agreeable. D/w pt PT will call [facility] during next session if needed. 9/20 Brooke Stewart, PT, DPT contacted [facility] and spoke with Javid Carr, RN regarding need for compression daily when lower extremities are in a dependent position. Phone call transferred to Astrid Connell, PT, DPT who spoke with wound care nurse, Gurpreet Chowdary regarding pt's compression. Discussed need for skin care and bandaging daily with gradient compression wrapping from distal to proximal from toes to just below knee. Gurpreet Chowdary states this is the current POC. PT provided pt with note requesting daily skin care and compression distal to proximal.      9/15 gave pt written note requesting daily wound care and daily bandage changes. Advised we are happy to teach facility staff how to wrap. Advised pt to contact [facility] to schedule pump demo. Advised pt to look into should he request (and pay for ) additional 1-2 units of ADLs assist a day so staff can/will wrap his legs? Pt is agreeable    9/8 gave pt handout for RN at facility requesting pt get daily assist with lower body bathing, application of lotion to LE and bandaging of LE    9/6 gave pt handout with written bandaging directions fr new RN at facility    8/16/22 1121am PT called Cee Camargo director of nursing next at [phone] re pt- discussed concern re possible new onset cellulitis R  LE. She will call pt;'s MD re possible start of antibiotics for cellulitis.     7/21 pt signed release of info form for PT to communicate with medical staff at [facility] ([facility] he [facility])     7/21 PT called Jojo Riley NP  re pt- discussed concern re possible new onset cellulitis R and or L LE, new onset wounds, inappropriate compression gradient with 2 tensoshapes that rolled. Salma Reyes  states she will call in antibiotic. At pt request, PT called SHANIA Koenig [phone]; unable to LM due to VM full. [facility] nursing next at [phone] - no answer, no VM. PT wrote note to RNs a pt's independent living facility re need for approp compression gradient and pt will order lymphedema bandages - low stretch 2x8cm, 4x12 cm, 2x15 cm artiflex. 6/30 pt signed release of info for PT to send PT notes and insurance info to [facility] re possibility of getting lymphedema pump for B LE    Pt education:   9/22, 9/8 pt ed on approp skin care and reveiwed re approp bandaging  8/9 review HEP - see below d/w pt benefit of rewrapping daily, how to void constriction marks and gradually increasing walking duration/distance- wrote this on his note to RN  7/21 extensive pt ed re appropriate compression and compression gradient, need to avoid constriction    6/30   pt ed and provided with form on what compression wrappings to purchase. Ed on ankle pumps and toe flx/ext to stimulate lymphatic flow. Pt educated on pathology and anatomy of etiology of lymphedema, condition precautions, indications for long term prognosis. Pt was educated on diagnosis; prognosis; PT POC including MLD, compression (role of multilayer bandaging/ compression garments), lymphedema management/prevention of flare ups, role of exercise, HEP, lymphedema pump; expectations for rehab. All pt questions were answered. HEP instruction:  9/6 extra foam under wraps    8/9 increase duration of walks by 15-60 sec; goal - increase walk duration so he can do 1-2 laps at facility. Re-wrap daily as able- trial of grey foam at ankles    7/26   Access Code: [email or web address]  URL: [email or web address]  Date: 07/26/2022  Prepared by: Shira Babinski    Exercises  Long Sitting Quad Set - 3 x daily - 7 x weekly - 1 sets - 10 reps - 5 sec hold  Supine Knee Extension Stretch on Towel Roll - 3 x daily - 7 x weekly - 5-6 min hold    7/21 pt to order low stretch compression bandages: 2x8cm, 4x12 cm and artiflex 2x15 cm and bring to next appt. Encouraged pt to try to plan for a staff member to come to appt to be instructed in lymphedema wrapping and approp comrpession gradient    Eval: Pt instructed in lymphedema management/prevention concepts and swipe method of MLD, ankle pumps, toe DF/PF    Therapeutic Exercise and NMR EXR  [] (89442) Provided verbal/tactile cueing for activities related to strengthening, flexibility, endurance, ROM for improvements in  [] LE / Lumbar: LE, proximal hip, and core control with self care, mobility, lifting, ambulation. [] UE / Cervical: cervical, postural, scapular, scapulothoracic and UE control with self care, reaching, carrying, lifting, house/yardwork, driving, computer work.  [] (01083) Provided verbal/tactile cueing for activities related to improving balance, coordination, kinesthetic sense, posture, motor skill, proprioception to assist with   [] LE / lumbar: LE, proximal hip, and core control in self care, mobility, lifting, ambulation and eccentric single leg control. [] UE / cervical: cervical, scapular, scapulothoracic and UE control with self care, reaching, carrying, lifting, house/yardwork, driving, computer work.   [] (49147) Therapist is in constant attendance of 2 or more patients providing skilled therapy interventions, but not providing any significant amount of measurable one-on-one time to either patient, for improvements in  [] LE / lumbar: LE, proximal hip, and core control in self care, mobility, lifting, ambulation and eccentric single leg control. [] UE / cervical: cervical, scapular, scapulothoracic and UE control with self care, reaching, carrying, lifting, house/yardwork, driving, computer work.      NMR and Therapeutic Activities:    [x] (78589 or 87597) Provided verbal/tactile cueing for activities related to improving balance, coordination, kinesthetic sense, posture, motor skill, proprioception and motor activation to allow for proper function of   [x] LE: / Lumbar core, proximal hip and LE with self care and ADLs  [] UE / Cervical: cervical, postural, scapular, scapulothoracic and UE control with self care, carrying, lifting, driving, computer work.   [] (06413) Gait Re-education- Provided training and instruction to the patient for proper LE, core and proximal hip recruitment and positioning and eccentric body weight control with ambulation re-education including up and down stairs     Home Management Training / Self Care:  [] (54528) Provided self-care/home management training related to activities of daily living and compensatory training, and/or use of adaptive equipment for improvement with: ADLs and compensatory training, meal preparation, safety procedures and instruction in use of adaptive equipment, including bathing, grooming, dressing, personal hygiene, basic household cleaning and chores.      Home Exercise Program:    [] (25650) Reviewed/Progressed HEP activities related to strengthening, flexibility, endurance, ROM of   [] LE / Lumbar: core, proximal hip and LE for functional self-care, mobility, lifting and ambulation/stair navigation   [] UE / Cervical: cervical, postural, scapular, scapulothoracic and UE control with self care, reaching, carrying, lifting, house/yardwork, driving, computer work  [] (82110)Reviewed/Progressed HEP activities related to improving balance, coordination, kinesthetic sense, posture, motor skill, proprioception of   [] LE: core, proximal hip and LE for self care, mobility, lifting, and ambulation/stair navigation    [] UE / Cervical: cervical, postural,  scapular, scapulothoracic and UE control with self care, reaching, carrying, lifting, house/yardwork, driving, computer work    Manual Treatments:  PROM / STM / Oscillations-Mobs:  G-I, II, III, IV (PA's, Inf., Post.)  [x] (60628) Provided manual therapy to mobilize LE, proximal hip and/or LS spine soft tissue/joints for the purpose of modulating pain, promoting relaxation,  increasing ROM, reducing/eliminating soft tissue swelling/inflammation/restriction, improving soft tissue extensibility and allowing for proper ROM for normal function with   [x] LE / lumbar: self care, mobility, lifting and ambulation. [] UE / Cervical: self care, reaching, carrying, lifting, house/yardwork, driving, computer work. Modalities:  [] (19895) Vasopneumatic compression: Utilized vasopneumatic compression to decrease edema / swelling for the purpose of improving mobility and quad tone / recruitment which will allow for increased overall function including but not limited to self-care, transfers, ambulation, and ascending / descending stairs. Charges:  Timed Code Treatment Minutes: 90   Total Treatment Minutes: 90     [] EVAL - LOW (85341)   [] EVAL - MOD (99814)  [] EVAL - HIGH (92418)  [] RE-EVAL (48018)  [] ON(68279) x   1    [] Ionto  [] NMR (04890) x       [] Vaso  [x] Manual (50475) x   4   [] Ultrasound  [x] TA x  2    [] Mech Traction (82332)  [] Aquatic Therapy x     [] ES (un) (55443):   [] Home Management Training x  [] ES(attended) (48101)   [] Dry Needling 1-2 muscles (33425):  [] Dry Needling 3+ muscles (157474  [] Group:      [] Other: gaitx1    GOALS:  Patient stated goal: \"Pt wants to be able to stand with his walker and walk household distances\"  [x] Progressing: [] Met: [] Not Met: [] Adjusted     Therapist goals for Patient:   Short Term Goals: To be achieved in: 2 weeks  1. Independent in HEP and progression per patient tolerance, in order to prevent return of swelling   [] Progressing: [x] Met: [] Not Met: [] Adjusted  2.  Patient will have a decrease in swelling/pain to facilitate improvement in movement, function, and ADLs as indicated by improvement with LLIS.  [] Progressing: [x] Met: [] Not Met: [] Adjusted     Long Term Goals: To be achieved in: 6 weeks  1. FOTO score of at least 46 to assist with reaching prior level of function. [] Progressing: [x] Met: [] Not Met: [] Adjusted  2. Patient will demonstrate increased AROM/strength of BLE to 4/5 so that pt can resume walking and standing without increase in symptoms. [x] Progressing: [] Met: [] Not Met: [] Adjusted  3. Decrease swelling of BLEs by 5cm so that pt can return to functional activities including standing, walking, squatting, and lifting without increased symptoms or restriction. [x] Progressing: [x] Met: MET RLE [] Not Met: [] Adjusted  4. Patient will be able to stand for >/= 5 minutes in order to improve standing tolerance for performing ADLs. [x] Progressing: [] Met: [] Not Met: [] Adjusted  5. Patient will be able to ambulate short community distances (300-500ft) with the LRAD to improve functional mobility in his home and community. [x] Progressing: [] Met: [] Not Met: [] Adjusted  NEW GOAL SET 10/6 reduce swelling so that pt can wear shoes with appropriate LE compression without increased symptoms. [] Progressing: [] Met: [] Not Met: [] Adjusted  NEW GOAL SET 10/6 increase stand daisy to 5-10 min with minimal to no intermittent  UE support. [] Progressing: [] Met: [] Not Met: [] Adjusted  NEW GOAL SET: pt to be independent with advanced HEP to appropriately manage lymphedema - to include don/doff advanced lymphedema pump with minimal to no assist of staff at [facility]. [] Progressing: [] Met: [] Not Met: [] Adjusted    Overall Progression Towards Functional goals/ Treatment Progress Update:  [x] Patient is progressing as expected towards functional goals listed. [] Progression is slowed due to complexities/Impairments listed. [] Progression has been slowed due to co-morbidities.   [] Plan just implemented, too soon to assess goals progression <30days   [] Goals require adjustment due to lack of progress  [] Patient is not progressing as expected and requires additional follow up with physician  [] Other    Persisting Functional Limitations/Impairments:  []Sleeping []Sitting               [x]Standing [x]Transfers        [x]Walking [x]Kneeling               [x]Stairs [x]Squatting / bending   [x]ADLs []Reaching  [x]Lifting  [x]Housework  []Driving []Job related tasks  []Sports/Recreation []Other:        ASSESSMENT:    11/2 Pt with increased edema in B lower legs, increased redness in R lower leg, and more fibrotic tissue compared to LV due to poor compliance with home program due to lack of assistance from staffing at pt's residence. Pt reports compliance improved in last 3 days. Pt provided with note from PT for nursing staff with importance of low stretch bandaging distal to proximal and use of lymphedema pump daily to reduce risk for wounds and infection. Phone number provided to call with questions. RLE only wrapped this date due to pt bringing 1 set of bandages. Added artiflex for extra layer of compression as wounds are now healed. Continue with MLD, compression, exercise, and home program to reduce edema to improve function and transition to compression garment. 10/11 Pt demonstrates improve posture with amb requiring less frequent cues for upright trunk and knee ext. Pt continues to sit on bed without bringing Ue's to bed. Discussed completing safe transfers at home and in clinic with appropriate hand placement. Pt with all wounds healed. Did not use horseshoe at B ankle as no deep skin folds notes. Continued to use foam on dorsum of feet. Continue with MLD, compression, and exercise to decrease edema and improve functional mobility. 10/6 pt cont to progress well - increased daisy of walking and LE ex today. Set new goals for cont progression with tx of lymphedema.  Improved swelling - worst swelling now at toes and dorsal feet R>L-- added trial of tensoshape to feet and toes today to address this. Requested RNs at [address] add increased gradient compression at toes and feet    10/4 POC completed this date. Pt presents with decreased edema in BLE compared to last PN and eval. Pt with wounds healed on RLE. Pt continues with edema in B LE, especially feet and ankles. Increased edema in LLE compared to LV as pt was unwrapped for about 1.5 days due to report of nursing staff at his home reporting they are only wrapping RLE as it was followed by wound care. Pt provided with note from PT requesting BLE bandages and both bandages wrapped distal to proximal. Pt will benefit from additional OP PT to further decrease edema in BLE and increase focus on functional mobility. 9/29 excellent response to the flexitouch trial with [facility] from [employer] on 9/23/22. Overall pt's LE cont to improve. Pt would greatly benefit for improved consistency with appropriate wrapping - with appropriate compression gradient, cleaning of LE at his facility. Pt will benefit form regular use of advanced lymph pump. He might benefit form trial of circaids for feet and LE as they would be easier to apply and should be adequate to provide approp compression. 9/27 Pt further educated on importance of stretching R knee into ext as he continues to lack full knee ext with amb. Completed skin care this date. Pt with decreased skin dryness and skin irritation compared to previous visit. Pt has been complaint with over 4 weeks of compression, elevation, and exercise, and edema in BLE and trunk remains.  Pt will benefit from advanced lymphedema pump due to truncal swelling and increased abdominal measurement following basic pump trial.     Phillip Pires from [employer] lymphedema pump trial:    Patient trialed the basic pump for about 15 mins @ 30mmHg pressure on the right leg, below are the results:     Before/ after/ difference:   Abdomen: 134.0cm/ 134.1cm/ +0.1cm   Thigh: 60.0cm/ 60.0cm/ +/- 0cm  Knee: 54.1cm/ 53.8cm/ -0.30cm  Calf: 41.9cm/ 41.9cm/ +/- 0cm  Ankle: 38.7cm/ 38.6cm/ -0.1cm     Patient then trialed the [address] on the right leg and abdomen at normal pressure for about 15 minutes, below are the results:     Before/ after/ difference:  Abdomen: 134.1cm/ 133.7cm/ -0.4cm  Thigh: 60.0 cm/ 59.1cm/ -0.9cm  Knee: 53.8cm/ 53.7cm/ -0.1cm  Calf: 41.9cm/ 40.8cm/ -1.1cm   Ankle: 38.6cm/ 38.3cm/ -0.3cm    9/22 pt appears to have abdominal swelling. If this persists, he would benefit from advanced lymph pump to include abdomen  pt feels that staff at [facility] are trying and learning how to take care of his legs. Pt advocating for himself as best as he can and receptive to instruction to cont to do so. Pt cont to benefit form compression , walking MLD. Will benefit greatly form lymph pump to use daily/most days    9/20 Pt with skin irritation around bandage on R dorsum of foot. Contacted staff at [facility] this date regarding appropriate skin care and bandaging. See above. Pt provided with note regarding distal to proximal compression and daily skin care. Continue with MLD, compression, and addition of home lymphedema pump to further decrease edema and fibrotic tissue. 9/15 New wound R ant shin: [id number] scab surrounded by [id number] red area. Increased pitting edema today - attribute this to staff at [facility] no longer wrapping his LE daily. Pt very frustrated by decreased wound and lymphedema care. Pt talking to head RN about it . Gave pt contact info for mara but advised pt to be careful how he approaches this   States he talked to [facility] director of nursing on 9/13 regarding new wound care nurse - she told pt she would check into everything - daily wound care and daily wrapping. Pt reports he hasn't heard back from her.    9/13 Washed BLE and applied lotion as pt with dry skin on B lower legs and feet, R LE > L LE.  Provided notes for nursing staff at pt's home to wash and moisturize legs daily and wrap prior to NV to allow PT to assess bandaging technique. Pt provided with phone number for [employer] to call regarding lymphedema pump trial as he has not been in contact with them recently. Continue with MLD, exercise, compression, and skin care to decrease edema and improve functional mobility. 9/8 pt needs assist with consistent and excellent skin care and daily bandaging. Staff turnover/changes at his facility has negatively impacted the health of his skin     9/6 swelling B LE much improved except significant swelling of feet and toes cont to be a problem R>L. Continues with wound R dorsal foot. Lymph pump should help prevent future wounds - pt reports hx of wounds on LE and infections since 2017. trial of  bandaging with extra foam today - to cover larger area at ankles and entire dorsum of foot - including dorsal toes    9/1 PN completed this date. Pt with significant decrease in edema in RLE this date with only 1 wound on dorsal ankle crease. Slight decreased edema in LLE. Pt with improved skin health with decreased skin dryness. Educated to continue with moisturizing, bandaging, and exercise to further decrease edema in B LE's and improve functional mobility. Treatment/Activity Tolerance:  [x] Patient able to complete tx [] Patient limited by fatigue  [] Patient limited by pain  [] Patient limited by other medical complications  [] Other:     Prognosis: [x] Good [] Fair  [] Poor    Patient Requires Follow-up: [x] Yes  [] No    Plan for next treatment session:   MLD, compression - bandage B LE below knee , HEP, pt education, lymph pump  B LE, lymph pump for home to help prevent chronic wounds R>L LE, exercise to stimulate lymphatic flow, LE ex, balance, gait, fxnl mobility    PLAN: See eval. PT 2x / week for 6 weeks.  + 2x/wk for 6 wks   [x] Continue per plan of care [] Alter current plan (see comments)  [] Plan of care initiated [] Hold pending MD visit [] Discharge    Electronically signed by: Seth Elmore, PT, DPT      Note: If patient does not return for scheduled/ recommended follow up visits, this note will serve as a discharge from care along with most recent update on progress.

## 2022-11-08 ENCOUNTER — HOSPITAL ENCOUNTER (OUTPATIENT)
Dept: PHYSICAL THERAPY | Age: 77
Setting detail: THERAPIES SERIES
Discharge: HOME | End: 2022-11-08
Payer: COMMERCIAL

## 2022-11-08 PROCEDURE — 97140 MANUAL THERAPY 1/> REGIONS: CPT

## 2022-11-08 PROCEDURE — 97530 THERAPEUTIC ACTIVITIES: CPT

## 2022-11-08 PROCEDURE — 97116 GAIT TRAINING THERAPY: CPT

## 2022-11-08 PROCEDURE — 97110 THERAPEUTIC EXERCISES: CPT

## 2022-11-08 NOTE — PLAN OF CARE
[facility] - Outpatient Physical Therapy       Physical Therapy Re-Certification Plan of Care    Dear Gordon Conti NP  ,    We had the pleasure of treating the following patient for physical therapy services at [facility] Therapy. A summary of our findings can be found in the updated assessment below. This includes our plan of care. If you have any questions or concerns regarding these findings, please do not hesitate to contact me at the office phone number checked above. Thank you for the referral.     Physician Signature:________________________________Date:__________________  By signing above (or electronic signature), therapist's plan is approved by physician      Functional Outcome:     FOTO: 47    Overall Response to Treatment:   []Patient is responding well to treatment and improvement is noted with regards  to goals   []Patient should continue to improve in reasonable time if they continue HEP   []Patient has plateaued and is no longer responding to skilled PT intervention    []Patient is getting worse and would benefit from return to referring MD   []Patient unable to adhere to initial POC   [x]Other:  Observe:   pt to PT clinic without wraps. Skin some dry flakes   pitting edema R>L. Mild redness R>L    Swelling: see girth f/s below    Gait : 1x 108', 1x140' with RW and CGA/SBA  Transfers: sit to/from stand SBA and B UE support    NEW GOAL SET 11/8/22: pt to be independent with approp assist of [facility] staff in using seated stepping machine most days and applying appropriate LE compression daily. A: overall excellent progress with swelling, fxnl mobility, and health of skin. No obvious wounds today. But, R LE with increased swelling today by 21.1cm today compared to last month. Today's swelling is similar to swelling R LE in aug-sept 2022.   This flare up is due to decreased frequency of low stretch compression bandaging since staffing changes at pt's facility about 6 wks ago. L LE total girth increased by 1.4 cm compared to last month  While pt reports significant improvement with RNs/staff helping him with use of lymph pump and appropriate wrapping of LE, pt only gets bandaged approx every other day, even when his LEs in a dependent position. This has resulted in recent flare up of swelling, pitting edema, redness, dry skin. Date range of Visits: 22 - 22  Total Visits:  +  + 3/5    Recommendation:    [x]Continue PT 2x / wk for 2 weeks. []Hold PT, pending MD visit       Physical Therapy Daily LYMPHEDEMA Treatment Note    Date:  2022    Patient Name:  Amber Amezcua   \"JANELLE\"  :  1945  MRN: [id number]  Restrictions/Precautions:    Medical/Treatment Diagnosis Information:  Diagnosis: BILATERAL LOWER EXTREMITY LYMPHEDEMA  Treatment Diagnosis: B LE chronic swelling and wounds R > L and decreased B LE strength, decreased functional mobilityPT diagnosis:  Insurance/Certification information:  PT Insurance Information:  [employer] [place] - no auth/no copay/BMN  Physician Information:  Johnathan Bro NP    Plan of care signed (Y/N): []  Yes [x]  No     Date of Patient follow up with Physician:       Progress Report: []  Yes  [x]  No     Date Range for reporting period:  Beginnin22  PT POC 22 - faxed for signature  PN 8/  PN ,   POC 10/4,  - faxed for signature  Ending    Progress report due (10 Rx/or 30 days whichever is less): visit #10 or 91     Recertification due (POC duration/ or 90 days whichever is less): 10/21/22    Visit # Insurance Allowable Auth required?  Date Range    +  + 3/5 BMN []  Yes  [x]  No pcy        Latex Allergy:  [x]NO      []YES  Preferred Language for Healthcare:   [x]English       []other:      Functional Scale: Date assessed:  FOTO physical FS primary measure score = 39; risk adjusted = 47                  6/30/22  FOTO physical FS primary measure score = 46                 8/2/22  FOTO physical FS primary measure score = 47                 9/1/22  FOTO physical FS primary measure score = 55                 10/6/22  FOTO physical FS primary measure score = 54                 11/8/2     Pain level:  0/10  B LE    SUBJECTIVE:  11/8 reports staff at [facility] is now being consistent with helping him use lymphedema pumps ea afternoon and re wrapping every other day. Reports his Care is improving and the new  RNs are getting more acclimated to his needs. 11/2 Pt reports he gained back the 11 pounds he lost due to not being wrapping or using the pump. States that over the past 2 weeks, nursing staff has not been wrapping his legs, but things have been better the last three days with bandaging and the lymphedema pump. Only brought 2 bandages this date as the remainder were wet. OBJECTIVE:   11/2 pt with increased edema in B lower legs and feet, R>L. Increased redness RLE - no warmth or pain. Constriction marks from elastic in hospital socks. 10/6 pt wearing wraps. Reports RNs washed and lotioned his LE yesterday.       GIRTH MEASUREMENT  (Tape on skin along anterior tibialis)     Lower Extremity Right (cm) Left  (cm) Right (cm) Left (cm) Right (cm) Left (cm)  Right (cm) Left (cm)  R L   Date 6/30/22 6/30/22 8/2 8/2 9/1 9/1 9/22 10/4 10/4 11/8 11/8   Measurements taken as follows: 0 cm at inf aspect of lateral malleolus and marked on    [] Ant aspect of LE    [x] Lateral aspect of LE    [] sheet                                 cm  Widest at hip                cm at waist (at umbilicus), measured while reclined on hospital bed 132.0 cm       134.5 cm on exhale  135.0 cm on inhale    While reclined on hospital bed   132.0 cm  on exhale  135.0 cm on inhale    While reclined on hospital bed Metatarsal heads 25.7cm 25.7cm 26.1 25.5 25.0 25.5  25.1 26.8 25.5 25.9   0 Cm above inf aspect of  LATERAL MALLEOLUS 37.8 35.5 cm 34.9 32.5 35.2 33.9  32.3 33.0 34.4 33.5    10 Cm above inf aspect of  LATERAL MALLEOLUS    38.7 33.4cm 39.1 30.6 31.9 32.0  27.0 31.7 35.2 32.1   20 Cm above  inf aspec of LATERAL MALLEOLUS 45.3 38.5 44.4 39.8 34.8 39.0  31.8 37.5 40.4 37.6   30 Cm above  inf aspect of  LATERAL MALLEOLUS 46.4 41.4 45.5 46.8 39.7 42.0  38.4 40.7 40.2 42.0                    Total Girth 193.9 174.5 190.0 175.2 166.6  172.4  154.6 cm  169.7 cm 175.7cm 171.1cm   11/8   R LE with increased swelling today by 21.1cm today compared to last month. Today's swelling is similar to swelling R LE in aug-sept. Flare up due to decreased frequency of low stretch compression bandaging since staffing changes at pt's facility about 6 wks ago. L LE total girth increased by 1.4 cm compared to last month    10/4   LLE total girth decreased by 2.7 cm compared to 9/1 and 4.8 cm compared to eval   LLE total girth decreased by 12.0 cm compared to 9/1 and 39.3 cm compared to eval     9/20 skin irritation on dorsum of R foot around border of wound care bandage    9/15 pt to clinic without wraps on - states he removed them yesterday bc they were falling off and staff did not come when requested to put back on. Increased pitting edema today. New wound R ant shin: [id number] scab surrounded by [id number] red area. States he talked to Lillie director of nursing on 9/13 regarding new wound care nurse - she told pt she would check into everything - daily wound care and daily wrapping. Pt reports he hasn't heard back from her.    9/8 pt's LEs B  not washed since Kaley PT washed them on 9/1 during PT session. Wound dorsal R foot: 1.3cmx 1.8cm.  red-tiarra Aretha Petite drainage; red rectangle of skin near/around wound   9/6 Pt reports hx of wounds on LE and infections since 201    9/1  LLE total girth decreased by 2.8 cm compared to 8/2  RLE total girth decreased by 23.4 cm compared to 8/2 8/30 small opening on R foot. Decreased edema B lower legs with increased edema at knees. 8/16/22: observe:    L LE: increased swelling at dorsum of foot and toes. Decreased swelling ankle to below knee. Foam rectangle at ankle reduces constriction  at ankle  R LE: increased swelling, increased redness and increased warmth compared to L LE. Concern re possible cellulitis. PT called RN at pt's living facility - see below  Deferred CKC ex due to possible cellulitis R LE      8/9 overall reduction in swelling and wounds healing. Distal swelling present at R foot and toes - needs improved compression gradient with bandages  NEW Blister opened R ant/lat shin: 1.3cm x 0.9cm. new red area R dorsal foot: 2\" x 1.5\" (Not open). PT cleaned opened blister area with alcohol wipe and covered with tegaderm - communicated to pt's RN on communication form    8/2    RLE decreased total girth by 3.9 cm   LLE increased total girth by 0.7 cm     7/21   Pt to clinic wearing 2 tensoshape each LE from mid foot to below knee. Signs of constriction present B LE ant ankle and R LE below knee - inappropriate compression gradient. Dorsal foot swelling increased B - due to constriction at ankles from rolled and doubled tensoshapes  causing constriction  multiple new wounds present B LEs:  L medial calf: 1.5 x 0.8cm, red area L ant ankle: 2.7cnx2.0 cm; R ant shin: 1.7cmx1.7cm, R ant ankle: not yet open but very red: 3.5cmx2.5 with black area in center of red area: 1.0cm x 0.9cm  Redness present B dorsal feet and R LE below knee. Increased warmth R LE compared to L. Concern for possible cellulitis.      RESTRICTIONS/PRECAUTIONS:     Exercises/Interventions:     Therapeutic Exercises (89503) Resistance / level Sets/sec Reps Notes   Nu step See below      Pt educated on exercises to stimulate lymphatic flow        CKC LE ex at // bars or counter       Ankle pumps  Toe DF/PF  SAQ  Gastroc stretch with active DF   T/o session                 Therapeutic Activities (15694)  (Dynamic activities such as compression, designed to improve functional performance)       Transfers:  sit to/from stand from  and hospital bed   VC for knee, hip, and trunk ext    Applied dressing to wound: PT cleaned area with alcohol wipe and covered with bandage      Compression: trial tensoshape size   applied to B LE - XL   7/15 Educated to follow up with wound care nurse when he returns home due to weeping and need for dressing change. Pt states he is on oral antibiotic    10/11 pt education on lymphedema pump. Discussed completing morning or night and unwrapping prior and wrapping after use of lymphedema pump. Multilayer compression bandaging to BLE  Stockinette    Large Foam rectangle at dorsum of B feet and dorsal toes  Artiflex   2x12 cm low stretch compression bandage    7/29 unwrapped lower extremities. Discussed increased compression at foot and wrapping distal to proximal. Wrote notes for pt's nurse. 8/5, 8/2 unwrapped. Educated to increase compression proximally   9/20, 8/26 unwrapped LE's - educated to increase compression at feet and ankles and to wrap to just under knee. Notes written for pt's nurse     POC completed 10/4 - objective measures taken. Discussed progress made with therapy. Educated to continue with bandaging, skin care, and ambulation/HEP    Pt education - educated on compression bandaging with graduated compression vs compression garment. Discussed possibility of zippered compression socks once edema decreases to allow for increased independence with donning compression. Pt states he will contact insurance regarding paying for compression garment. Educated on cutting top of elastic on socks to reduce constriction. Cut socks present this date.    Pt provided with notes for nursing staff with importance of daily bandaging and lymphedema pump to decrease edema and reduce risk for infection and wounds. Pt reports R knee pain with ext for 1 hr during lymphedema pump. Advised pt to keep ext for as long as tolerable to improve R knee ext and reduce risk for further knee flexion contractures. Instructed he can use folded pillow under knee for last 10 min of pump when knee ext is not tolerable and take away immediately following pump. X 15 min total  10/4 LLE only this date due to only RLE wrapped upon arrival. Pt forgot other bandages at home. X 21 min  11/2 RLE only  as pt only brought 2 bandages, instructed to have nurse wrap when he returns home  7/26 educated pt and Arelis (wound care nurse) on bandaging    Ambulation  80'  with RW and CGA/SBA    9/8 deferred due to time constrains - assisted pt to wash B LE and applied lotion    9/6 gait limited by fatigue 7/21 pt SOB after   HEP   - LLD knee ext stretch   - quad set   - ankle pump  - LAQ      Transfer w/c <> bed - SBA  Transfer sit to/form stand at wc  Transfer sit <> supine X 1   11/1 VC for hand placement with STS on bed and knee ext in standing    Nu step   S=11, arms =10, workload =3 8 min  Sci fit s=19, arms =6    8/16 deferred due to possible cellulitis    Wearing multilayer bandaging   Rest break     9/29, 9/27, 9/229/8, 8/30 Unwrapped B LE's. Educated on bandaging up to knee crease. Washed B lower legs with gentle soap and applied lotion. Educated on importance of skin health and keeping legs clean and moisturized. 9/15, 9/13 Washed B lower legs with gentle soap and applied lotion. Educated on importance of skin health and keeping legs clean and moisturized. Wrote notes for Pt's nurse.  Provided with phone number for [facility] to obtain home lymphedema pump as he has not been in contact with them recently                         9/13 education partially completed during MLD    Home Management  (providing pt education on safety procedures/instructions)       Pt educated on compression as follows:   how to appropriately apply and wear compression  how to maintain appropriate gradient compression  do not sleep with compression garment/tensoshape  signs and symptoms of constriction   when to remove compression       Pt educated on lymphedema prevention and management    7/12 completed during MLD    Pt educated on MLD                            Neuromuscular Re-ed (86058)       balance                     Manual Intervention (05397)      See MLD flowchart below   X 10 min   7/21 deferred due to concern re possible cellulitis                                        Manual Lymph Drainage (MLD):  MLD to B LE , clearing along alternate pathway of  Ipsilateral trunk  to  Ipsilateral axillary  lymph nodes    Clear Nodes 10x each   Neck x   [address]    Axilla x   Abdomen x   Groin x   Popliteal x2 x   Clear Alternate Pathway 10x each   Re-clear alternate pathway   x5 each position x   Location Ipsilateral trunk       Fluid Mobilization 10x each   Re-clear alternate pathway   x5 each position x   Shoulder bracing    Location B LE and ipsilateral trunk       Protein Resorption 10x each   Location LE below knee       Clear Foot/Ankle or Hand 10x each   Achilles x   Bilateral malleolus x   Fan the cards x   Clear dorsum x   Clear through web space x   Clear toes/fingers x   Fluid mobilization x   Re-clear all positions  X5 each x       Modalities:     OTHER:   11/8 sent note o [facility]:  Requested RNs at [facility] wrap pt's LE daily to prevent flare up of lymphedema and new onset wounds. PT called [facility] at  307pm [phone] to discuss need for pt to be bandaged daily to prevent flare up and new wounds. The phone rang until PT hung up at conclusion of pt appt at 315pm      10/6 note to [place]:  Requested RNs at [facility] add increased gradient compression at toes and feet  9/29 PT sent another note to pt's facility re please re-wrap, lotion and wash LE daily. ,  asked staff to use new tape when re-wrapping.       9/22 pt ed re how to advocate for himself for LE washing and bandaging - pt agreeable. D/w pt PT will call [facility] during next session if needed. 9/20 Pamela cMkeon, PT, DPT contacted [facility] and spoke with Harvey Thomason RN regarding need for compression daily when lower extremities are in a dependent position. Phone call transferred to Lee Rosa PT, DPT who spoke with wound care nurse, [place] regarding pt's compression. Discussed need for skin care and bandaging daily with gradient compression wrapping from distal to proximal from toes to just below knee. [place] states this is the current POC. PT provided pt with note requesting daily skin care and compression distal to proximal.      9/15 gave pt written note requesting daily wound care and daily bandage changes. Advised we are happy to teach facility staff how to wrap. Advised pt to contact Lexis at [facility] to schedule pump demo. Advised pt to look into should he request (and pay for ) additional 1-2 units of ADLs assist a day so staff can/will wrap his legs? Pt is agreeable    9/8 gave pt handout for RN at facility requesting pt get daily assist with lower body bathing, application of lotion to LE and bandaging of LE    9/6 gave pt handout with written bandaging directions fr new RN at facility    8/16/22 1121am PT called Aric Interiano director of nursing next at [phone] re pt- discussed concern re possible new onset cellulitis R  LE. She will call pt;'s MD re possible start of antibiotics for cellulitis. 7/21 pt signed release of info form for PT to communicate with medical staff at [facility]     7/21 PT called Gabriel Cisneros NP  re pt- discussed concern re possible new onset cellulitis R and or L LE, new onset wounds, inappropriate compression gradient with 2 tensoshapes that rolled. Juanjo Polo  states she will call in antibiotic.   At pt request, PT called SHANIA Dwyer [phone]; unable to LM due to VM full. Called [facility] director of nursing next at [phone] - no answer, no VM. PT wrote note to RNs a pt's independent living facility re need for approp compression gradient and pt will order lymphedema bandages - low stretch 2x8cm, 4x12 cm, 2x15 cm artiflex. 6/30 pt signed release of info for PT to send PT notes and insurance info to [facility] re possibility of getting lymphedema pump for B LE    Pt education:   9/22, 9/8 pt ed on approp skin care and reveiwed re approp bandaging  8/9 review HEP - see below d/w pt benefit of rewrapping daily, how to void constriction marks and gradually increasing walking duration/distance- wrote this on his note to RN  7/21 extensive pt ed re appropriate compression and compression gradient, need to avoid constriction    6/30   pt ed and provided with form on what compression wrappings to purchase. Ed on ankle pumps and toe flx/ext to stimulate lymphatic flow. Pt educated on pathology and anatomy of etiology of lymphedema, condition precautions, indications for long term prognosis. Pt was educated on diagnosis; prognosis; PT POC including MLD, compression (role of multilayer bandaging/ compression garments), lymphedema management/prevention of flare ups, role of exercise, HEP, lymphedema pump; expectations for rehab. All pt questions were answered. HEP instruction:  9/6 extra foam under wraps    8/9 increase duration of walks by 15-60 sec; goal - increase walk duration so he can do 1-2 laps at facility. Re-wrap daily as able- trial of grey foam at ankles    7/26   Access Code: [email or web address]  URL: [email or web address]  Date: 07/26/2022  Prepared by: Gloria Gordon    Exercises  Long Sitting Quad Set - 3 x daily - 7 x weekly - 1 sets - 10 reps - 5 sec hold  Supine Knee Extension Stretch on Towel Roll - 3 x daily - 7 x weekly - 5-6 min hold    7/21 pt to order low stretch compression bandages: 2x8cm, 4x12 cm and artiflex 2x15 cm and bring to next appt. Encouraged pt to try to plan for a staff member to come to appt to be instructed in lymphedema wrapping and approp comrpession gradient    Eval: Pt instructed in lymphedema management/prevention concepts and swipe method of MLD, ankle pumps, toe DF/PF    Therapeutic Exercise and NMR EXR  [] (05344) Provided verbal/tactile cueing for activities related to strengthening, flexibility, endurance, ROM for improvements in  [] LE / Lumbar: LE, proximal hip, and core control with self care, mobility, lifting, ambulation. [] UE / Cervical: cervical, postural, scapular, scapulothoracic and UE control with self care, reaching, carrying, lifting, house/yardwork, driving, computer work.  [] (65618) Provided verbal/tactile cueing for activities related to improving balance, coordination, kinesthetic sense, posture, motor skill, proprioception to assist with   [] LE / lumbar: LE, proximal hip, and core control in self care, mobility, lifting, ambulation and eccentric single leg control. [] UE / cervical: cervical, scapular, scapulothoracic and UE control with self care, reaching, carrying, lifting, house/yardwork, driving, computer work.   [] (54219) Therapist is in constant attendance of 2 or more patients providing skilled therapy interventions, but not providing any significant amount of measurable one-on-one time to either patient, for improvements in  [] LE / lumbar: LE, proximal hip, and core control in self care, mobility, lifting, ambulation and eccentric single leg control. [] UE / cervical: cervical, scapular, scapulothoracic and UE control with self care, reaching, carrying, lifting, house/yardwork, driving, computer work.      NMR and Therapeutic Activities:    [x] (64894 or 20969) Provided verbal/tactile cueing for activities related to improving balance, coordination, kinesthetic sense, posture, motor skill, proprioception and motor activation to allow for proper function of   [x] LE: / Lumbar core, proximal hip and LE with self care and ADLs  [] UE / Cervical: cervical, postural, scapular, scapulothoracic and UE control with self care, carrying, lifting, driving, computer work.   [] (70273) Gait Re-education- Provided training and instruction to the patient for proper LE, core and proximal hip recruitment and positioning and eccentric body weight control with ambulation re-education including up and down stairs     Home Management Training / Self Care:  [] (67075) Provided self-care/home management training related to activities of daily living and compensatory training, and/or use of adaptive equipment for improvement with: ADLs and compensatory training, meal preparation, safety procedures and instruction in use of adaptive equipment, including bathing, grooming, dressing, personal hygiene, basic household cleaning and chores.      Home Exercise Program:    [] (11173) Reviewed/Progressed HEP activities related to strengthening, flexibility, endurance, ROM of   [] LE / Lumbar: core, proximal hip and LE for functional self-care, mobility, lifting and ambulation/stair navigation   [] UE / Cervical: cervical, postural, scapular, scapulothoracic and UE control with self care, reaching, carrying, lifting, house/yardwork, driving, computer work  [] (59188)Reviewed/Progressed HEP activities related to improving balance, coordination, kinesthetic sense, posture, motor skill, proprioception of   [] LE: core, proximal hip and LE for self care, mobility, lifting, and ambulation/stair navigation    [] UE / Cervical: cervical, postural,  scapular, scapulothoracic and UE control with self care, reaching, carrying, lifting, house/yardwork, driving, computer work    Manual Treatments:  PROM / STM / Oscillations-Mobs:  G-I, II, III, IV (PA's, Inf., Post.)  [x] (72529) Provided manual therapy to mobilize LE, proximal hip and/or LS spine soft tissue/joints for the purpose of modulating pain, promoting relaxation, increasing ROM, reducing/eliminating soft tissue swelling/inflammation/restriction, improving soft tissue extensibility and allowing for proper ROM for normal function with   [x] LE / lumbar: self care, mobility, lifting and ambulation. [] UE / Cervical: self care, reaching, carrying, lifting, house/yardwork, driving, computer work. Modalities:  [] (13456) Vasopneumatic compression: Utilized vasopneumatic compression to decrease edema / swelling for the purpose of improving mobility and quad tone / recruitment which will allow for increased overall function including but not limited to self-care, transfers, ambulation, and ascending / descending stairs. Charges:  Timed Code Treatment Minutes: 75   Total Treatment Minutes: 75     [] EVAL - LOW (76064)   [] EVAL - MOD (73655)  [] EVAL - HIGH (81539)  [] RE-EVAL (60816)  [x] EO(51792) x   1    [] Ionto  [] NMR (93625) x       [] Vaso  [x] Manual (39234) x   1   [] Ultrasound  [x] TA x  2    [] Mech Traction (78750)  [] Aquatic Therapy x     [] ES (un) (86166):   [] Home Management Training x  [] ES(attended) (57723)   [] Dry Needling 1-2 muscles (20012):  [] Dry Needling 3+ muscles (488081  [] Group:      [x] Other: gaitx1    GOALS:  Patient stated goal: \"Pt wants to be able to stand with his walker and walk household distances\"  [x] Progressing: [] Met: [] Not Met: [] Adjusted     Therapist goals for Patient:   Short Term Goals: To be achieved in: 2 weeks  1. Independent in HEP and progression per patient tolerance, in order to prevent return of swelling   [] Progressing: [x] Met: [] Not Met: [] Adjusted  2. Patient will have a decrease in swelling/pain to facilitate improvement in movement, function, and ADLs as indicated by improvement with LLIS. [] Progressing: [x] Met: [] Not Met: [] Adjusted     Long Term Goals: To be achieved in: 6 weeks  1. FOTO score of at least 46 to assist with reaching prior level of function.   [] Progressing: [x] Met: [] Not Met: [] Adjusted  2. Patient will demonstrate increased AROM/strength of BLE to 4/5 so that pt can resume walking and standing without increase in symptoms. [x] Progressing: [] Met: [] Not Met: [] Adjusted  3. Decrease swelling of BLEs by 5cm so that pt can return to functional activities including standing, walking, squatting, and lifting without increased symptoms or restriction. [x] Progressing: [x] Met: MET RLE [] Not Met: [] Adjusted  4. Patient will be able to stand for >/= 5 minutes in order to improve standing tolerance for performing ADLs. [x] Progressing: [] Met: [] Not Met: [] Adjusted  5. Patient will be able to ambulate short community distances (300-500ft) with the LRAD to improve functional mobility in his home and community. [x] Progressing: [] Met: [] Not Met: [] Adjusted  NEW GOAL SET 10/6 reduce swelling so that pt can wear shoes with appropriate LE compression without increased symptoms. [] Progressing: [] Met: [] Not Met: [] Adjusted  NEW GOAL SET 10/6 increase stand daisy to 5-10 min with minimal to no intermittent  UE support. [] Progressing: [] Met: [] Not Met: [] Adjusted  NEW GOAL SET: pt to be independent with advanced HEP to appropriately manage lymphedema - to include don/doff advanced lymphedema pump with minimal to no assist of staff at [facility]. [] Progressing: [] Met: [] Not Met: [] Adjusted    Overall Progression Towards Functional goals/ Treatment Progress Update:  [x] Patient is progressing as expected towards functional goals listed. [] Progression is slowed due to complexities/Impairments listed. [] Progression has been slowed due to co-morbidities.   [] Plan just implemented, too soon to assess goals progression <30days   [] Goals require adjustment due to lack of progress  [] Patient is not progressing as expected and requires additional follow up with physician  [] Other    Persisting Functional Limitations/Impairments:  []Sleeping []Sitting               [x]Standing [x]Transfers        [x]Walking [x]Kneeling               [x]Stairs [x]Squatting / bending   [x]ADLs []Reaching  [x]Lifting  [x]Housework  []Driving []Job related tasks  []Sports/Recreation []Other:        ASSESSMENT:  11/8 see PT POC  11/2 Pt with increased edema in B lower legs, increased redness in R lower leg, and more fibrotic tissue compared to LV due to poor compliance with home program due to lack of assistance from staffing at pt's residence. Pt reports compliance improved in last 3 days. Pt provided with note from PT for nursing staff with importance of low stretch bandaging distal to proximal and use of lymphedema pump daily to reduce risk for wounds and infection. Phone number provided to call with questions. RLE only wrapped this date due to pt bringing 1 set of bandages. Added artiflex for extra layer of compression as wounds are now healed. Continue with MLD, compression, exercise, and home program to reduce edema to improve function and transition to compression garment. 10/11 Pt demonstrates improve posture with amb requiring less frequent cues for upright trunk and knee ext. Pt continues to sit on bed without bringing Ue's to bed. Discussed completing safe transfers at home and in clinic with appropriate hand placement. Pt with all wounds healed. Did not use horseshoe at B ankle as no deep skin folds notes. Continued to use foam on dorsum of feet. Continue with MLD, compression, and exercise to decrease edema and improve functional mobility. 10/6 pt cont to progress well - increased daisy of walking and LE ex today. Set new goals for cont progression with tx of lymphedema. Improved swelling - worst swelling now at toes and dorsal feet R>L-- added trial of tensoshape to feet and toes today to address this. Requested RNs at [address] add increased gradient compression at toes and feet    10/4 POC completed this date. Pt presents with decreased edema in BLE compared to last PN and eval. Pt with wounds healed on RLE. Pt continues with edema in B LE, especially feet and ankles. Increased edema in LLE compared to LV as pt was unwrapped for about 1.5 days due to report of nursing staff at his home reporting they are only wrapping RLE as it was followed by wound care. Pt provided with note from PT requesting BLE bandages and both bandages wrapped distal to proximal. Pt will benefit from additional OP PT to further decrease edema in BLE and increase focus on functional mobility. 9/29 excellent response to the flexitouch trial with [facility] from [facility] on 9/23/22. Overall pt's LE cont to improve. Pt would greatly benefit for improved consistency with appropriate wrapping - with appropriate compression gradient, cleaning of LE at his facility. Pt will benefit form regular use of advanced lymph pump. He might benefit form trial of circaids for feet and LE as they would be easier to apply and should be adequate to provide approp compression. 9/27 Pt further educated on importance of stretching R knee into ext as he continues to lack full knee ext with amb. Completed skin care this date. Pt with decreased skin dryness and skin irritation compared to previous visit. Pt has been complaint with over 4 weeks of compression, elevation, and exercise, and edema in BLE and trunk remains.  Pt will benefit from advanced lymphedema pump due to truncal swelling and increased abdominal measurement following basic pump trial.     Per [facility] from [facility] lymphedema pump trial:    Patient trialed the basic pump for about 15 mins @ 30mmHg pressure on the right leg, below are the results:     Before/ after/ difference:   Abdomen: 134.0cm/ 134.1cm/ +0.1cm   Thigh: 60.0cm/ 60.0cm/ +/- 0cm  Knee: 54.1cm/ 53.8cm/ -0.30cm  Calf: 41.9cm/ 41.9cm/ +/- 0cm  Ankle: 38.7cm/ 38.6cm/ -0.1cm     Patient then trialed the Flexiotuch on the right leg and abdomen at normal pressure for about 15 minutes, below are the results:     Before/ after/ difference:  Abdomen: 134.1cm/ 133.7cm/ -0.4cm  Thigh: 60.0 cm/ 59.1cm/ -0.9cm  Knee: 53.8cm/ 53.7cm/ -0.1cm  Calf: 41.9cm/ 40.8cm/ -1.1cm   Ankle: 38.6cm/ 38.3cm/ -0.3cm    9/22 pt appears to have abdominal swelling. If this persists, he would benefit from advanced lymph pump to include abdomen  pt feels that staff at [facility] are trying and learning how to take care of his legs. Pt advocating for himself as best as he can and receptive to instruction to cont to do so. Pt cont to benefit form compression , walking MLD. Will benefit greatly form lymph pump to use daily/most days    9/20 Pt with skin irritation around bandage on R dorsum of foot. Contacted staff at [facility] this date regarding appropriate skin care and bandaging. See above. Pt provided with note regarding distal to proximal compression and daily skin care. Continue with MLD, compression, and addition of home lymphedema pump to further decrease edema and fibrotic tissue. 9/15 New wound R ant shin: [id number] scab surrounded by [id number] red area. Increased pitting edema today - attribute this to staff at [facility] no longer wrapping his LE daily. Pt very frustrated by decreased wound and lymphedema care. Pt talking to head RN about it . Gave pt contact info for [facility] but advised pt to be careful how he approaches this   States he talked to [address] [place] [address] director of nursing on 9/13 regarding new wound care nurse - she told pt she would check into everything - daily wound care and daily wrapping. Pt reports he hasn't heard back from her.    9/13 Washed BLE and applied lotion as pt with dry skin on B lower legs and feet, R LE > L LE. Provided notes for nursing staff at pt's home to wash and moisturize legs daily and wrap prior to NV to allow PT to assess bandaging technique.  Pt provided with phone number for [facility] medical to call regarding lymphedema pump trial as he has not been in contact with them recently. Continue with MLD, exercise, compression, and skin care to decrease edema and improve functional mobility. 9/8 pt needs assist with consistent and excellent skin care and daily bandaging. Staff turnover/changes at his facility has negatively impacted the health of his skin     9/6 swelling B LE much improved except significant swelling of feet and toes cont to be a problem R>L. Continues with wound R dorsal foot. Lymph pump should help prevent future wounds - pt reports hx of wounds on LE and infections since 2017. trial of  bandaging with extra foam today - to cover larger area at ankles and entire dorsum of foot - including dorsal toes    9/1 PN completed this date. Pt with significant decrease in edema in RLE this date with only 1 wound on dorsal ankle crease. Slight decreased edema in LLE. Pt with improved skin health with decreased skin dryness. Educated to continue with moisturizing, bandaging, and exercise to further decrease edema in B LE's and improve functional mobility. Treatment/Activity Tolerance:  [x] Patient able to complete tx [] Patient limited by fatigue  [] Patient limited by pain  [] Patient limited by other medical complications  [] Other:     Prognosis: [x] Good [] Fair  [] Poor    Patient Requires Follow-up: [x] Yes  [] No    Plan for next treatment session:   MLD, compression - bandage B LE below knee , HEP, pt education, lymph pump  B LE, lymph pump for home to help prevent chronic wounds R>L LE, exercise to stimulate lymphatic flow, LE ex, balance, gait, fxnl mobility    PLAN: See eval. PT 2x / week for 6 weeks.  + 2x/wk for 6 wks   [x] Continue per plan of care [] Alter current plan (see comments)  [] Plan of care initiated [] Hold pending MD visit [] Discharge    Electronically signed by: Yaniv Hodgson, PT, DPT      Note: If patient does not return for scheduled/ recommended follow up visits, this note will serve as a discharge from care along with most recent update on progress.

## 2022-11-15 ENCOUNTER — HOSPITAL ENCOUNTER (OUTPATIENT)
Dept: PHYSICAL THERAPY | Age: 77
Setting detail: THERAPIES SERIES
Discharge: HOME | End: 2022-11-15
Payer: COMMERCIAL

## 2022-11-15 PROCEDURE — 97116 GAIT TRAINING THERAPY: CPT

## 2022-11-15 PROCEDURE — 97140 MANUAL THERAPY 1/> REGIONS: CPT

## 2022-11-15 PROCEDURE — 97530 THERAPEUTIC ACTIVITIES: CPT

## 2022-11-15 PROCEDURE — 97110 THERAPEUTIC EXERCISES: CPT

## 2022-11-15 NOTE — FLOWSHEET NOTE
[facility] - Outpatient Physical Therapy       Physical Therapy Daily LYMPHEDEMA Treatment Note    Date:  11/15/2022    Patient Name:  Darren Pump   \"JANELLE\"  :  1945  MRN: [id number]  Restrictions/Precautions:    Medical/Treatment Diagnosis Information:  Diagnosis: BILATERAL LOWER EXTREMITY LYMPHEDEMA  Treatment Diagnosis: B LE chronic swelling and wounds R > L and decreased B LE strength, decreased functional mobilityPT diagnosis:  Insurance/Certification information:  PT Insurance Information: [address] [place] [address] - no auth/no copay/BMN  Physician Information:  Marcos De NP    Plan of care signed (Y/N): []  Yes [x]  No     Date of Patient follow up with Physician:       Progress Report: []  Yes  [x]  No     Date Range for reporting period:  Beginnin22  PT POC 22 - faxed for signature  PN   PN ,   POC 10/4,  - faxed for signature  Ending    Progress report due (10 Rx/or 30 days whichever is less): visit #10 or      Recertification due (POC duration/ or 90 days whichever is less): 10/21/22    Visit # Insurance Allowable Auth required?  Date Range    +  +  BMN []  Yes  [x]  No pcy        Latex Allergy:  [x]NO      []YES  Preferred Language for Healthcare:   [x]English       []other:      Functional Scale:                                                                                                      Date assessed:  FOTO physical FS primary measure score = 39; risk adjusted = 47                  22  FOTO physical FS primary measure score = 46                 22  FOTO physical FS primary measure score = 47                 22  FOTO physical FS primary measure score = 55                 10/6/22  FOTO physical FS primary measure score = 54                      Pain level:  4-5/10  B LE    SUBJECTIVE:  11/15 has talked with Dory Zafar at PT at [facility]- she is looking into can he use the stepper in PT clinic. He has been keeping a record of how often the nurses are helping him care for his legs -- see objective section below  NEXT SESSION:  ask patient to sign release of info for PT Terrie Goel at [facility] to receive his PT notes. Include referral (on PT POC/DC note) for PT tx for fxnl mobility at [facility]. 11/8 reports staff at [facility] is now being consistent with helping him use lymphedema pumps ea afternoon and re wrapping every other day. Reports his Care is improving and the new  RNs are getting more acclimated to his needs. 11/2 Pt reports he gained back the 11 pounds he lost due to not being wrapping or using the pump. States that over the past 2 weeks, nursing staff has not been wrapping his legs, but things have been better the last three days with bandaging and the lymphedema pump. Only brought 2 bandages this date as the remainder were wet. OBJECTIVE:   11/15 pt brings records of when nursing staff has been helping him care for his lymphedema/LEs:  11/8 - nursing wrapped him and helped him use pump, 11/9: nursing helped with pump but applied pump on top of wraps, 11/10 pt removed his wraps - nursing not available to help, 11/11 nursing helped with pump,  11/12 - nothing, 11/13, nothing, 11/14 nothing, 11/15 nothing prior to PT appt    11/8 see PT POC   11/2 pt with increased edema in B lower legs and feet, R>L. Increased redness RLE - no warmth or pain. Constriction marks from elastic in hospital socks. 10/6 pt wearing wraps. Reports RNs washed and lotioned his LE yesterday.       GIRTH MEASUREMENT  (Tape on skin along anterior tibialis)     Lower Extremity Right (cm) Left  (cm) Right (cm) Left (cm) Right (cm) Left (cm)  Right (cm) Left (cm)  R L   Date 6/30/22 6/30/22 8/2 8/2 9/1 9/1 9/22 10/4 10/4 11/8 11/8   Measurements taken as follows: 0 cm at inf aspect of lateral malleolus and marked on    [] Ant aspect of LE    [x] Lateral aspect of LE    [] sheet cm  Widest at hip                cm at waist (at umbilicus), measured while reclined on hospital bed 132.0 cm       134.5 cm on exhale  135.0 cm on inhale    While reclined on hospital bed   132.0 cm  on exhale  135.0 cm on inhale    While reclined on hospital bed                     Metatarsal heads 25.7cm 25.7cm 26.1 25.5 25.0 25.5  25.1 26.8 25.5 25.9   0 Cm above inf aspect of  LATERAL MALLEOLUS 37.8 35.5 cm 34.9 32.5 35.2 33.9  32.3 33.0 34.4 33.5    10 Cm above inf aspect of  LATERAL MALLEOLUS    38.7 33.4cm 39.1 30.6 31.9 32.0  27.0 31.7 35.2 32.1   20 Cm above  inf aspec of LATERAL MALLEOLUS 45.3 38.5 44.4 39.8 34.8 39.0  31.8 37.5 40.4 37.6   30 Cm above  inf aspect of  LATERAL MALLEOLUS 46.4 41.4 45.5 46.8 39.7 42.0  38.4 40.7 40.2 42.0                    Total Girth 193.9 174.5 190.0 175.2 166.6  172.4  154.6 cm  169.7 cm 175.7cm 171.1cm   11/8   R LE with increased swelling today by 21.1cm today compared to last month. Today's swelling is similar to swelling R LE in aug-sept. Flare up due to decreased frequency of low stretch compression bandaging since staffing changes at pt's facility about 6 wks ago. L LE total girth increased by 1.4 cm compared to last month    10/4   LLE total girth decreased by 2.7 cm compared to 9/1 and 4.8 cm compared to eval   LLE total girth decreased by 12.0 cm compared to 9/1 and 39.3 cm compared to eval     9/20 skin irritation on dorsum of R foot around border of wound care bandage    9/15 pt to clinic without wraps on - states he removed them yesterday bc they were falling off and staff did not come when requested to put back on. Increased pitting edema today. New wound R ant shin: [id number] scab surrounded by [id number] red area. States he talked to Lillie director of nursing on 9/13 regarding new wound care nurse - she told pt she would check into everything - daily wound care and daily wrapping.  Pt reports he hasn't heard back from her.    9/8 pt's LEs B  not washed since [facility] PT washed them on 9/1 during PT session. Wound dorsal R foot: 1.3cmx 1.8cm. red-tiarra Skipper [place] drainage; red rectangle of skin near/around wound   9/6 Pt reports hx of wounds on LE and infections since 201    9/1  LLE total girth decreased by 2.8 cm compared to 8/2  RLE total girth decreased by 23.4 cm compared to 8/2 8/30 small opening on R foot. Decreased edema B lower legs with increased edema at knees. 8/16/22: observe:    L LE: increased swelling at dorsum of foot and toes. Decreased swelling ankle to below knee. Foam rectangle at ankle reduces constriction  at ankle  R LE: increased swelling, increased redness and increased warmth compared to L LE. Concern re possible cellulitis. PT called RN at pt's living facility - see below  Deferred [facility] ex due to possible cellulitis R LE      8/9 overall reduction in swelling and wounds healing. Distal swelling present at R foot and toes - needs improved compression gradient with bandages  NEW Blister opened R ant/lat shin: 1.3cm x 0.9cm. new red area R dorsal foot: 2\" x 1.5\" (Not open). PT cleaned opened blister area with alcohol wipe and covered with tegaderm - communicated to pt's RN on communication form    8/2    RLE decreased total girth by 3.9 cm   LLE increased total girth by 0.7 cm     7/21   Pt to clinic wearing 2 tensoshape each LE from mid foot to below knee. Signs of constriction present B LE ant ankle and R LE below knee - inappropriate compression gradient. Dorsal foot swelling increased B - due to constriction at ankles from rolled and doubled tensoshapes  causing constriction  multiple new wounds present B LEs:  L medial calf: 1.5 x 0.8cm, red area L ant ankle: 2.7cnx2.0 cm; R ant shin: 1.7cmx1.7cm, R ant ankle: not yet open but very red: 3.5cmx2.5 with black area in center of red area: 1.0cm x 0.9cm  Redness present B dorsal feet and R LE below knee. Increased warmth R LE compared to L. compression. Pt states he will contact insurance regarding paying for compression garment. Educated on cutting top of elastic on socks to reduce constriction. Cut socks present this date. Pt provided with notes for nursing staff with importance of daily bandaging and lymphedema pump to decrease edema and reduce risk for infection and wounds. Pt reports R knee pain with ext for 1 hr during lymphedema pump. Advised pt to keep ext for as long as tolerable to improve R knee ext and reduce risk for further knee flexion contractures. Instructed he can use folded pillow under knee for last 10 min of pump when knee ext is not tolerable and take away immediately following pump. X 20 min total  10/4 LLE only this date due to only RLE wrapped upon arrival. Pt forgot other bandages at home. 11/2 RLE only  as pt only brought 2 bandages, instructed to have nurse wrap when he returns home  7/26 educated pt and Arelis (wound care nurse) on bandaging    Ambulation  1x40' then needed rest due to LE fatigued after more time on seated stepper;  with RW and CGA/SBA    1x50' & 1x10' with RW and CGA/SBA    9/8 deferred due to time constrains - assisted pt to wash B LE and applied lotion    9/6 gait limited by fatigue 7/21 pt SOB after   HEP   - LLD knee ext stretch   - quad set   - ankle pump  - LAQ      Transfer w/c <> bed - SBA  Transfer sit to/form stand at wc  Transfer sit <> supine X 1   11/1 VC for hand placement with STS on bed and knee ext in standing    Nu step   S=11, arms =10, workload =3 4', 6', 3' min  Sci fit s=19, arms =6    8/16 deferred due to possible cellulitis    Wearing multilayer bandaging   Rest break     9/29, 9/27, 9/229/8, 8/30 Unwrapped B LE's. Educated on bandaging up to knee crease. Washed B lower legs with gentle soap and applied lotion. Educated on importance of skin health and keeping legs clean and moisturized.     9/15, 9/13 Washed B lower legs with gentle soap and applied lotion. Educated on importance of skin health and keeping legs clean and moisturized. Wrote notes for Pt's nurse.  Provided with phone number for [facility] medical to obtain home lymphedema pump as he has not been in contact with them recently                         9/13 education partially completed during MLD    Home Management  (providing pt education on safety procedures/instructions)       Pt educated on compression as follows:   how to appropriately apply and wear compression  how to maintain appropriate gradient compression  do not sleep with compression garment/tensoshape  signs and symptoms of constriction   when to remove compression       Pt educated on lymphedema prevention and management    7/12 completed during MLD    Pt educated on MLD                            Neuromuscular Re-ed (57116)       balance                     Manual Intervention (02808)      See MLD flowchart below   X30' min   7/21 deferred due to concern re possible cellulitis                                        Manual Lymph Drainage (MLD):  MLD to B LE , clearing along alternate pathway of  Ipsilateral trunk  to  Ipsilateral axillary  lymph nodes    Clear Nodes 10x each   Neck x   [address]    Axilla x   Abdomen x   Groin x   Popliteal x2 x   Clear Alternate Pathway 10x each   Re-clear alternate pathway   x5 each position x   Location Ipsilateral trunk       Fluid Mobilization 10x each   Re-clear alternate pathway   x5 each position x   Shoulder bracing    Location B LE and ipsilateral trunk       Protein Resorption 10x each   Location LE below knee       Clear Foot/Ankle or Hand 10x each   Achilles x   Bilateral malleolus x   Fan the cards x   Clear dorsum x   Clear through web space x   Clear toes/fingers x   Fluid mobilization x   Re-clear all positions  X5 each x       Modalities:     OTHER:   11/15 PT called [facility] at [phone] and sent note to hussein echeverria to advise: left message for Cristian More  PT to call PT back. Spoke with Adalberto Soto  who went to look for [employer]  PT communicated with RN Beronica Booth about pt's needs:   he needs daily wrapping, daily use of pump and daily re-wrapping after use of pump - or is at increased risk of new wounds and lymphedema will flare up, making fxnl mobility more difficult. Pt's lymphedema will not get better until/unless he gets daily compression to counteract effects of gravity. After 3 wks of consistent daily compression, pt will be ready and appropriate for fitting of custom compression garments which will be easier to don and doff than the bandages. He will need to continue with daily wrapping until he gets custom compression garments. D/w pt that his best strategy to get the care he needs might be going thru [facility]. Sanchez Nicoleanalia is agreeable to work hard to make all this happen for pt when she is on his unit and she will communicate all  this to the other staff. Pt also needs daily cardio ex - walking and will benefit form use of seated stepping machine  Assisted pt to call [employer] [facility] - put on hold x 10 min - unable to get thru - pt agreeable to call back later when he has time to be on hold for a long time. Josias Church PT called PT back at approximately 200pm -  discussed that PT sent referral for PT tx at [facility] for fxnl mobility when PT writes DC note from lymphedema PT tx.      11/8 sent note o [facility]:  [place] [facility] [place] pt's LE daily to prevent flare up of lymphedema and new onset wounds. PT called [facility] at  307pm [phone] to discuss need for pt to be bandaged daily to prevent flare up and new wounds. The phone rang until PT hung up at conclusion of pt appt at 315pm      10/6 note to [facility]:  Requested RNs at [facility] [place] [place] [facility] add increased gradient compression at toes and feet  9/29 PT sent another note to pt's facility re please re-wrap, lotion and wash LE daily. ,  asked staff to use new tape when re-wrapping.       9/22 pt ed re how to advocate for himself for LE washing and bandaging - pt agreeable. D/w pt PT will call [facility] during next session if needed. 9/20 Paulina Singh, PT, DPT contacted [facility] and spoke with Eric Aguero RN regarding need for compression daily when lower extremities are in a dependent position. Phone call transferred to Roxy Gerber PT, DPT who spoke with wound care nurse, Charna Dakins regarding pt's compression. Discussed need for skin care and bandaging daily with gradient compression wrapping from distal to proximal from toes to just below knee. Charna Dakins states this is the current POC. PT provided pt with note requesting daily skin care and compression distal to proximal.      9/15 gave pt written note requesting daily wound care and daily bandage changes. Advised we are happy to teach facility staff how to wrap. Advised pt to contact Lexis at [facility] to schedule pump demo. Advised pt to look into should he request (and pay for ) additional 1-2 units of ADLs assist a day so staff can/will wrap his legs? Pt is agreeable    9/8 gave pt handout for RN at facility requesting pt get daily assist with lower body bathing, application of lotion to LE and bandaging of LE    9/6 gave pt handout with written bandaging directions fr new RN at facility    8/16/22 1121am PT called Álvaro Mensah director of nursing next at [phone] re pt- discussed concern re possible new onset cellulitis R  LE. She will call pt;'s MD re possible start of antibiotics for cellulitis. 7/21 pt signed release of info form for PT to communicate with medical staff at [facility] ([facility] he [facility])     7/21 PT called Kwan Capone NP  re pt- discussed concern re possible new onset cellulitis R and or L LE, new onset wounds, inappropriate compression gradient with 2 tensoshapes that rolled. [facility]  [facility] will [facility] in [facility].   At pt request, PT called SHANIA Solis [phone]; unable to LM due to VM full. Called Lisa director of nursing next at [phone] - no answer, no VM. PT wrote note to RNs a pt's independent living facility re need for approp compression gradient and pt will order lymphedema bandages - low stretch 2x8cm, 4x12 cm, 2x15 cm artiflex. 6/30 pt signed release of info for PT to send PT notes and insurance info to [facility] possibility of getting lymphedema pump for B LE    Pt education:   11/15 review HEP and strategies for pt to request assist of staff at [facility] - he needs daily wrapping, daily use of pump and daily re-wrapping after use of pump - or is at increased risk of new wounds and lymphedema will flare up, making fxnl mobility more difficult. Pt's lymphedema will not get better until/unless he gets daily compression to counteract effects of gravity. After 3 wks of consistent daily compression, pt will be ready and appropriate for fitting of custom compression garments which will be easier to don and doff than the bandages. He will need to continue with daily wrapping until he gets custom compression garments. D/w pt that his best strategy to get the care he needs might be going thru [place] [facility]. Pt also needs daily cardio ex - walking and will benefit form use of seated stepping machine    9/22, 9/8 pt ed on approp skin care and reveiwed re approp bandaging  8/9 review HEP - see below d/w pt benefit of rewrapping daily, how to void constriction marks and gradually increasing walking duration/distance- wrote this on his note to RN  7/21 extensive pt ed re appropriate compression and compression gradient, need to avoid constriction    6/30   pt ed and provided with form on what compression wrappings to purchase. Ed on ankle pumps and toe flx/ext to stimulate lymphatic flow. Pt educated on pathology and anatomy of etiology of lymphedema, condition precautions, indications for long term prognosis.     Pt was educated on diagnosis; prognosis; PT POC including MLD, compression (role of multilayer bandaging/ compression garments), lymphedema management/prevention of flare ups, role of exercise, HEP, lymphedema pump; expectations for rehab. All pt questions were answered. HEP instruction:  11/15 daily wrapping, daily use of pump, daily re-wrapping after use of pump. 9/6 extra foam under wraps    8/9 increase duration of walks by 15-60 sec; goal - increase walk duration so he can do 1-2 laps at facility. Re-wrap daily as able- trial of grey foam at ankles    7/26   Access Code: [email or web address]  URL: [email or web address]  Date: 07/26/2022  Prepared by: Seth Elmore    Exercises  Long Sitting Quad Set - 3 x daily - 7 x weekly - 1 sets - 10 reps - 5 sec hold  Supine Knee Extension Stretch on Towel Roll - 3 x daily - 7 x weekly - 5-6 min hold    7/21 pt to order low stretch compression bandages: 2x8cm, 4x12 cm and artiflex 2x15 cm and bring to next appt. Encouraged pt to try to plan for a staff member to come to appt to be instructed in lymphedema wrapping and approp comrpession gradient    Eval: Pt instructed in lymphedema management/prevention concepts and swipe method of MLD, ankle pumps, toe DF/PF    Therapeutic Exercise and NMR EXR  [] (01771) Provided verbal/tactile cueing for activities related to strengthening, flexibility, endurance, ROM for improvements in  [] LE / Lumbar: LE, proximal hip, and core control with self care, mobility, lifting, ambulation. [] UE / Cervical: cervical, postural, scapular, scapulothoracic and UE control with self care, reaching, carrying, lifting, house/yardwork, driving, computer work.  [] (79429) Provided verbal/tactile cueing for activities related to improving balance, coordination, kinesthetic sense, posture, motor skill, proprioception to assist with   [] LE / lumbar: LE, proximal hip, and core control in self care, mobility, lifting, ambulation and eccentric single leg control.    [] UE / cervical: cervical, scapular, scapulothoracic and UE control with self care, reaching, carrying, lifting, house/yardwork, driving, computer work.   [] (50718) Therapist is in constant attendance of 2 or more patients providing skilled therapy interventions, but not providing any significant amount of measurable one-on-one time to either patient, for improvements in  [] LE / lumbar: LE, proximal hip, and core control in self care, mobility, lifting, ambulation and eccentric single leg control. [] UE / cervical: cervical, scapular, scapulothoracic and UE control with self care, reaching, carrying, lifting, house/yardwork, driving, computer work. NMR and Therapeutic Activities:    [x] (62466 or 85895) Provided verbal/tactile cueing for activities related to improving balance, coordination, kinesthetic sense, posture, motor skill, proprioception and motor activation to allow for proper function of   [x] LE: / Lumbar core, proximal hip and LE with self care and ADLs  [] UE / Cervical: cervical, postural, scapular, scapulothoracic and UE control with self care, carrying, lifting, driving, computer work.   [] (34260) Gait Re-education- Provided training and instruction to the patient for proper LE, core and proximal hip recruitment and positioning and eccentric body weight control with ambulation re-education including up and down stairs     Home Management Training / Self Care:  [] (66757) Provided self-care/home management training related to activities of daily living and compensatory training, and/or use of adaptive equipment for improvement with: ADLs and compensatory training, meal preparation, safety procedures and instruction in use of adaptive equipment, including bathing, grooming, dressing, personal hygiene, basic household cleaning and chores.      Home Exercise Program:    [] (03109) Reviewed/Progressed HEP activities related to strengthening, flexibility, endurance, ROM of   [] LE / Lumbar: core, proximal hip and LE for functional self-care, mobility, lifting and ambulation/stair navigation   [] UE / Cervical: cervical, postural, scapular, scapulothoracic and UE control with self care, reaching, carrying, lifting, house/yardwork, driving, computer work  [] (26428)Reviewed/Progressed HEP activities related to improving balance, coordination, kinesthetic sense, posture, motor skill, proprioception of   [] LE: core, proximal hip and LE for self care, mobility, lifting, and ambulation/stair navigation    [] UE / Cervical: cervical, postural,  scapular, scapulothoracic and UE control with self care, reaching, carrying, lifting, house/yardwork, driving, computer work    Manual Treatments:  PROM / STM / Oscillations-Mobs:  G-I, II, III, IV (PA's, Inf., Post.)  [x] (65412) Provided manual therapy to mobilize LE, proximal hip and/or LS spine soft tissue/joints for the purpose of modulating pain, promoting relaxation,  increasing ROM, reducing/eliminating soft tissue swelling/inflammation/restriction, improving soft tissue extensibility and allowing for proper ROM for normal function with   [x] LE / lumbar: self care, mobility, lifting and ambulation. [] UE / Cervical: self care, reaching, carrying, lifting, house/yardwork, driving, computer work. Modalities:  [] (23074) Vasopneumatic compression: Utilized vasopneumatic compression to decrease edema / swelling for the purpose of improving mobility and quad tone / recruitment which will allow for increased overall function including but not limited to self-care, transfers, ambulation, and ascending / descending stairs.        Charges:  Timed Code Treatment Minutes: 90   Total Treatment Minutes: 90     [] EVAL - LOW (27154)   [] EVAL - MOD (61754)  [] EVAL - HIGH (77954)  [] RE-EVAL (32223)  [x] OQ(38905) x   1    [] Ionto  [] NMR (95126) x       [] Vaso  [x] Manual (02450) x   2   [] Ultrasound  [x] TA x  2    [] [facility] (76481)  [] Aquatic Therapy x     [] 5-10 min with minimal to no intermittent  UE support. [] Progressing: [] Met: [] Not Met: [] Adjusted  NEW GOAL SET: pt to be independent with advanced HEP to appropriately manage lymphedema - to include don/doff advanced lymphedema pump with minimal to no assist of staff at [facility]. [] Progressing: [] Met: [] Not Met: [] Adjusted    Overall Progression Towards Functional goals/ Treatment Progress Update:  [x] Patient is progressing as expected towards functional goals listed. [] Progression is slowed due to complexities/Impairments listed. [] Progression has been slowed due to co-morbidities. [] Plan just implemented, too soon to assess goals progression <30days   [] Goals require adjustment due to lack of progress  [] Patient is not progressing as expected and requires additional follow up with physician  [] Other    Persisting Functional Limitations/Impairments:  []Sleeping []Sitting               [x]Standing [x]Transfers        [x]Walking [x]Kneeling               [x]Stairs [x]Squatting / bending   [x]ADLs []Reaching  [x]Lifting  [x]Housework  []Driving []Job related tasks  []Sports/Recreation []Other:        ASSESSMENT:  11/15he needs daily wrapping, daily use of pump and daily re-wrapping after use of pump - or is at increased risk of new wounds and lymphedema will flare up, making fxnl mobility more difficult. Pt's lymphedema will not get better until/unless he gets daily compression to counteract effects of gravity. After 3 wks of consistent daily compression, pt will be ready and appropriate for fitting of custom compression garments which will be easier to don and doff than the bandages. He will need to continue with daily wrapping until he gets custom compression garments. D/w pt that his best strategy to get the care he needs might be going thru [facility].  Pt will benefit from PT tx for increasing fxnl mobility when he is d/c'd from lymphedema PT tx.     11/8 overall excellent progress with swelling, fxnl mobility, and health of skin. No obvious wounds today. But, R LE with increased swelling today by 21.1cm today compared to last month. Today's swelling is similar to swelling R LE in aug-sept 2022. This flare up is due to decreased frequency of low stretch compression bandaging since staffing changes at pt's facility about 6 wks ago. L LE total girth increased by 1.4 cm compared to last month  While pt reports significant improvement with RNs/staff helping him with use of lymph pump and appropriate wrapping of LE, pt only gets bandaged approx every other day, even when his LEs in a dependent position. This has resulted in recent flare up of swelling, pitting edema, redness, dry skin. 11/2 Pt with increased edema in B lower legs, increased redness in R lower leg, and more fibrotic tissue compared to LV due to poor compliance with home program due to lack of assistance from staffing at pt's residence. Pt reports compliance improved in last 3 days. Pt provided with note from PT for nursing staff with importance of low stretch bandaging distal to proximal and use of lymphedema pump daily to reduce risk for wounds and infection. Phone number provided to call with questions. RLE only wrapped this date due to pt bringing 1 set of bandages. Added artiflex for extra layer of compression as wounds are now healed. Continue with MLD, compression, exercise, and home program to reduce edema to improve function and transition to compression garment. 10/11 Pt demonstrates improve posture with amb requiring less frequent cues for upright trunk and knee ext. Pt continues to sit on bed without bringing Ue's to bed. Discussed completing safe transfers at home and in clinic with appropriate hand placement. Pt with all wounds healed. Did not use horseshoe at B ankle as no deep skin folds notes. Continued to use foam on dorsum of feet.  Continue with MLD, compression, and exercise to decrease edema and improve functional mobility. 10/6 pt cont to progress well - increased daisy of walking and LE ex today. Set new goals for cont progression with tx of lymphedema. Improved swelling - worst swelling now at toes and dorsal feet R>L-- added trial of tensoshape to feet and toes today to address this. Requested RNs at [facility] point add increased gradient compression at toes and feet    10/4 POC completed this date. Pt presents with decreased edema in BLE compared to last PN and eval. Pt with wounds healed on RLE. Pt continues with edema in B LE, especially feet and ankles. Increased edema in LLE compared to LV as pt was unwrapped for about 1.5 days due to report of nursing staff at his home reporting they are only wrapping RLE as it was followed by wound care. Pt provided with note from PT requesting BLE bandages and both bandages wrapped distal to proximal. Pt will benefit from additional OP PT to further decrease edema in BLE and increase focus on functional mobility. 9/29 excellent response to the flexitouch trial with [facility] from [facility] on 9/23/22. Overall pt's LE cont to improve. Pt would greatly benefit for improved consistency with appropriate wrapping - with appropriate compression gradient, cleaning of LE at his facility. Pt will benefit form regular use of advanced lymph pump. He might benefit form trial of circaids for feet and LE as they would be easier to apply and should be adequate to provide approp compression. 9/27 Pt further educated on importance of stretching R knee into ext as he continues to lack full knee ext with amb. Completed skin care this date. Pt with decreased skin dryness and skin irritation compared to previous visit. Pt has been complaint with over 4 weeks of compression, elevation, and exercise, and edema in BLE and trunk remains.  Pt will benefit from advanced lymphedema pump due to truncal swelling and increased abdominal measurement following basic pump trial.     Per Kimberley Bryson from [facility] lymphedema pump trial:    Patient trialed the basic pump for about 15 mins @ 30mmHg pressure on the right leg, below are the results:     Before/ after/ difference:   Abdomen: 134.0cm/ 134.1cm/ +0.1cm   Thigh: 60.0cm/ 60.0cm/ +/- 0cm  Knee: 54.1cm/ 53.8cm/ -0.30cm  Calf: 41.9cm/ 41.9cm/ +/- 0cm  Ankle: 38.7cm/ 38.6cm/ -0.1cm     Patient then trialed the [employer] on the right leg and abdomen at normal pressure for about 15 minutes, below are the results:     Before/ after/ difference:  Abdomen: 134.1cm/ 133.7cm/ -0.4cm  Thigh: 60.0 cm/ 59.1cm/ -0.9cm  Knee: 53.8cm/ 53.7cm/ -0.1cm  Calf: 41.9cm/ 40.8cm/ -1.1cm   Ankle: 38.6cm/ 38.3cm/ -0.3cm    9/22 pt appears to have abdominal swelling. If this persists, he would benefit from advanced lymph pump to include abdomen  pt feels that staff at [facility] are trying and learning how to take care of his legs. Pt advocating for himself as best as he can and receptive to instruction to cont to do so. Pt cont to benefit form compression , walking MLD. Will benefit greatly form lymph pump to use daily/most days    9/20 Pt with skin irritation around bandage on R dorsum of foot. Contacted staff at [facility] this date regarding appropriate skin care and bandaging. See above. Pt provided with note regarding distal to proximal compression and daily skin care. Continue with MLD, compression, and addition of home lymphedema pump to further decrease edema and fibrotic tissue. 9/15 New wound R ant shin: [id number] scab surrounded by [id number] red area. Increased pitting edema today - attribute this to staff at [facility] no longer wrapping his LE daily. Pt very frustrated by decreased wound and lymphedema care. Pt talking to head RN about it .   Gave pt contact info for mara but advised pt to be careful how he approaches this   States he talked to [facility] director of nursing on 9/13 regarding new wound care nurse - latricia told pt she would check into everything - daily wound care and daily wrapping. Pt reports he hasn't heard back from her.    9/13 Washed BLE and applied lotion as pt with dry skin on B lower legs and feet, R LE > L LE. Provided notes for nursing staff at pt's home to wash and moisturize legs daily and wrap prior to NV to allow PT to assess bandaging technique. Pt provided with phone number for [employer] to call regarding lymphedema pump trial as he has not been in contact with them recently. Continue with MLD, exercise, compression, and skin care to decrease edema and improve functional mobility. 9/8 pt needs assist with consistent and excellent skin care and daily bandaging. Staff turnover/changes at his facility has negatively impacted the health of his skin     9/6 swelling B LE much improved except significant swelling of feet and toes cont to be a problem R>L. Continues with wound R dorsal foot. Lymph pump should help prevent future wounds - pt reports hx of wounds on LE and infections since 2017. trial of  bandaging with extra foam today - to cover larger area at ankles and entire dorsum of foot - including dorsal toes    9/1 PN completed this date. Pt with significant decrease in edema in RLE this date with only 1 wound on dorsal ankle crease. Slight decreased edema in LLE. Pt with improved skin health with decreased skin dryness. Educated to continue with moisturizing, bandaging, and exercise to further decrease edema in B LE's and improve functional mobility. Treatment/Activity Tolerance:  [x] Patient able to complete tx [] Patient limited by fatigue  [] Patient limited by pain  [] Patient limited by other medical complications  [] Other:     Prognosis: [x] Good [] Fair  [] Poor    Patient Requires Follow-up: [x] Yes  [] No    Plan for next treatment session: d/c with advanced HEP and assist of staff at [address] to apply bandages appropriately and assist to use pump daily.     P: MLD, compression - bandage B LE below knee , HEP, pt education, lymph pump  B LE, lymph pump for home to help prevent chronic wounds R>L LE, exercise to stimulate lymphatic flow, LE ex, balance, gait, fxnl mobility    PLAN: See eval. PT 2x / week for 6 weeks. + 2x/wk for 6 wks   [x] Continue per plan of care [] Alter current plan (see comments)  [] Plan of care initiated [] Hold pending MD visit [] Discharge    Electronically signed by: Ying Reynolds, PT, DPT      Note: If patient does not return for scheduled/ recommended follow up visits, this note will serve as a discharge from care along with most recent update on progress.

## 2022-11-22 ENCOUNTER — APPOINTMENT (OUTPATIENT)
Dept: PHYSICAL THERAPY | Age: 77
End: 2022-11-22
Payer: COMMERCIAL

## 2022-12-15 ENCOUNTER — HOSPITAL ENCOUNTER (OUTPATIENT)
Dept: PHYSICAL THERAPY | Age: 77
Setting detail: THERAPIES SERIES
Discharge: HOME | End: 2022-12-15
Payer: COMMERCIAL

## 2022-12-15 PROCEDURE — 97110 THERAPEUTIC EXERCISES: CPT

## 2022-12-15 PROCEDURE — 97530 THERAPEUTIC ACTIVITIES: CPT

## 2022-12-15 PROCEDURE — 97140 MANUAL THERAPY 1/> REGIONS: CPT

## 2022-12-15 NOTE — PLAN OF CARE
[facility] - Outpatient Physical Therapy   Physical Therapy Re-Certification Plan of Care    Dear Nelli Reyes NP  ,    We had the pleasure of treating the following patient for physical therapy services at [facility] Outpatient Physical Therapy. A summary of our findings can be found in the updated assessment below. This includes our plan of care. If you have any questions or concerns regarding these findings, please do not hesitate to contact me at the office phone number checked above. Thank you for the referral.     Physician Signature:________________________________Date:__________________  By signing above (or electronic signature), therapist's plan is approved by physician      Functional Outcome:     FOTO: 52    Overall Response to Treatment:   []Patient is responding well to treatment and improvement is noted with regards  to goals   []Patient should continue to improve in reasonable time if they continue HEP   []Patient has plateaued and is no longer responding to skilled PT intervention    []Patient is getting worse and would benefit from return to referring MD   []Patient unable to adhere to initial POC   [x]Other:     0: observe: no open wounds. 1 small scab L great toe - medial aspect near base of nail.   1 small scab R ant ankle (due to bandaging rubbing prior to addition of foam at ankle)    Fxnl daisy: walks a large [place] that takes 20-25 min, 3x/day     Swelling: see girth f/s below  R LE swelling decreased by 10.5 cm from last visit and decreased 28.7 cm from initial eval  L LE swelling decreased by 8.9 cm from last visit and decreased 12.5 cm from initial eval    Transfers: Supervision with 1 UE support to transfer form  to hospital bed    A: GOALS MET  pt has advocated for himself and other residents and now is getting appropriate care from nursing - gets help with don/doff lymph pump ea morning ad within about 45 min of pump coming off, days whichever is less): 10/21/22    Visit # Insurance Allowable Auth required? Date Range   12/12 + 12/12 + 5/5 BMN []  Yes  [x]  No pcy        Latex Allergy:  [x]NO      []YES  Preferred Language for Healthcare:   [x]English       []other:      Functional Scale:                                                                                                      Date assessed:  FOTO physical FS primary measure score = 39; risk adjusted = 47                  6/30/22  FOTO physical FS primary measure score = 46                 8/2/22  FOTO physical FS primary measure score = 47                 9/1/22  FOTO physical FS primary measure score = 55                 10/6/22  FOTO physical FS primary measure score = 54                 11/8/2   FOTO  49 12/15/22    Pain level:  4-5/10  B LE    SUBJECTIVE:  12/15 pt reports RN staff now regularly helping him daily with lymph pump and multilayer compression wrapping    11/15 has talked with Rell Garcia at PT at [facility]- she is looking into can he use the stepper in PT clinic. He has been keeping a record of how often the nurses are helping him care for his legs -- see objective section below  NEXT SESSION:  ask patient to sign release of info for PT Maryuri Durham at [facility] to receive his PT notes. Include referral (on PT POC/DC note) for PT tx for fxnl mobility at [facility]. 11/8 reports staff at [address] is now being consistent with helping him use lymphedema pumps ea afternoon and re wrapping every other day. Reports his Care is improving and the new  RNs are getting more acclimated to his needs. 11/2 Pt reports he gained back the 11 pounds he lost due to not being wrapping or using the pump. States that over the past 2 weeks, nursing staff has not been wrapping his legs, but things have been better the last three days with bandaging and the lymphedema pump. Only brought 2 bandages this date as the remainder were wet.      OBJECTIVE: 11/15 pt brings records of when nursing staff has been helping him care for his lymphedema/LEs:  11/8 - nursing wrapped him and helped him use pump, 11/9: nursing helped with pump but applied pump on top of wraps, 11/10 pt removed his wraps - nursing not available to help, 11/11 nursing helped with pump,  11/12 - nothing, 11/13, nothing, 11/14 nothing, 11/15 nothing prior to PT appt    11/8 see PT POC   11/2 pt with increased edema in B lower legs and feet, R>L. Increased redness RLE - no warmth or pain. Constriction marks from elastic in hospital socks. 10/6 pt wearing wraps. Reports RNs washed and lotioned his LE yesterday.       GIRTH MEASUREMENT  (Tape on skin along anterior tibialis)     Lower Extremity Right (cm) Left  (cm) Right (cm) Left (cm) Right (cm) Left (cm)  Right (cm) Left (cm)  R L R L   Date 6/30/22 6/30/22 8/2 8/2 9/1 9/1 9/22 10/4 10/4 11/8 11/8 12/15 12/115   Measurements taken as follows: 0 cm at inf aspect of lateral malleolus and marked on    [] Ant aspect of LE    [x] Lateral aspect of LE    [] sheet                                     cm  Widest at hip                  cm at waist (at umbilicus), measured while reclined on hospital bed 132.0 cm       134.5 cm on exhale  135.0 cm on inhale    While reclined on hospital bed   132.0 cm  on exhale  135.0 cm on inhale    While reclined on hospital bed  132.1 cm                       Metatarsal heads 25.7cm 25.7cm 26.1 25.5 25.0 25.5  25.1 26.8 25.5 25.9 24.8 24.6   0 Cm above inf aspect of  LATERAL MALLEOLUS 37.8 35.5 cm 34.9 32.5 35.2 33.9  32.3 33.0 34.4 33.5 33.0 31.0    10 Cm above inf aspect of  LATERAL MALLEOLUS    38.7 33.4cm 39.1 30.6 31.9 32.0  27.0 31.7 35.2 32.1 32.6 29.9   20 Cm above  inf aspec of LATERAL MALLEOLUS 45.3 38.5 44.4 39.8 34.8 39.0  31.8 37.5 40.4 37.6 34.8 35.2   30 Cm above  inf aspect of  LATERAL MALLEOLUS 46.4 41.4 45.5 46.8 39.7 42.0  38.4 40.7 40.2 42.0 40.0 41.3                      Total Girth 193.9 174.5 190.0 175.2 166.6  172.4  154.6 cm  169.7 cm 175.7cm 171.1cm 165.2cm 162.0 cm   11/8   R LE with increased swelling today by 21.1cm today compared to last month. Today's swelling is similar to swelling R LE in aug-sept. Flare up due to decreased frequency of low stretch compression bandaging since staffing changes at pt's facility about 6 wks ago. L LE total girth increased by 1.4 cm compared to last month    10/4   LLE total girth decreased by 2.7 cm compared to 9/1 and 4.8 cm compared to eval   LLE total girth decreased by 12.0 cm compared to 9/1 and 39.3 cm compared to eval     9/20 skin irritation on dorsum of R foot around border of wound care bandage    9/15 pt to clinic without wraps on - states he removed them yesterday bc they were falling off and staff did not come when requested to put back on. Increased pitting edema today. New wound R ant shin: [id number] scab surrounded by [id number] red area. States he talked to [facility] director of nursing on 9/13 regarding new wound care nurse - she told pt she would check into everything - daily wound care and daily wrapping. Pt reports he hasn't heard back from her.    9/8 pt's LEs B  not washed since [facility] PT washed them on 9/1 during PT session. Wound dorsal R foot: 1.3cmx 1.8cm. red-tiarra Gerline Rush drainage; red rectangle of skin near/around wound   9/6 Pt reports hx of wounds on LE and infections since 201    9/1  LLE total girth decreased by 2.8 cm compared to 8/2  RLE total girth decreased by 23.4 cm compared to 8/2 8/30 small opening on R foot. Decreased edema B lower legs with increased edema at knees. 8/16/22: observe:    L LE: increased swelling at dorsum of foot and toes. Decreased swelling ankle to below knee. Foam rectangle at ankle reduces constriction  at ankle  R LE: increased swelling, increased redness and increased warmth compared to L LE. Concern re possible cellulitis.   PT called RN at pt's living facility - see below  Deferred CKC ex due to possible cellulitis R LE      8/9 overall reduction in swelling and wounds healing. Distal swelling present at R foot and toes - needs improved compression gradient with bandages  NEW Blister opened R ant/lat shin: 1.3cm x 0.9cm. new red area R dorsal foot: 2\" x 1.5\" (Not open). PT cleaned opened blister area with alcohol wipe and covered with tegaderm - communicated to pt's RN on communication form    8/2    RLE decreased total girth by 3.9 cm   LLE increased total girth by 0.7 cm     7/21   Pt to clinic wearing 2 tensoshape each LE from mid foot to below knee. Signs of constriction present B LE ant ankle and R LE below knee - inappropriate compression gradient. Dorsal foot swelling increased B - due to constriction at ankles from rolled and doubled tensoshapes  causing constriction  multiple new wounds present B LEs:  L medial calf: 1.5 x 0.8cm, red area L ant ankle: 2.7cnx2.0 cm; R ant shin: 1.7cmx1.7cm, R ant ankle: not yet open but very red: 3.5cmx2.5 with black area in center of red area: 1.0cm x 0.9cm  Redness present B dorsal feet and R LE below knee. Increased warmth R LE compared to L. Concern for possible cellulitis.      RESTRICTIONS/PRECAUTIONS:     Exercises/Interventions:     Therapeutic Exercises (07824) Resistance / level Sets/sec Reps Notes   Nu step See below      Pt educated on exercises to stimulate lymphatic flow        CKC LE ex at // bars or counter       Ankle pumps  Toe DF/PF  SAQ  Gastroc stretch with active DF   T/o session                 Therapeutic Activities (20806)  (Dynamic activities such as compression, designed to improve functional performance)       Transfers:  sit to/from stand from  and hospital bed   VC for knee, hip, and trunk ext    Applied dressing to wound: PT cleaned area with alcohol wipe and covered with bandage      Compression: trial tensoshape size   applied to B LE - XL   7/15 Educated to follow up with wound care nurse when he returns home due to weeping and need for dressing change. Pt states he is on oral antibiotic    10/11 pt education on lymphedema pump. Discussed completing morning or night and unwrapping prior and wrapping after use of lymphedema pump. Multilayer compression bandaging to BLE  Stockinette    Large Foam rectangle at dorsum of B feet and dorsal toes  Artiflex   2x12 cm low stretch compression bandage    7/29 unwrapped lower extremities. Discussed increased compression at foot and wrapping distal to proximal. Wrote notes for pt's nurse. 8/5, 8/2 unwrapped. Educated to increase compression proximally   9/20, 8/26 unwrapped LE's - educated to increase compression at feet and ankles and to wrap to just under knee. Notes written for pt's nurse     POC completed 10/4 - objective measures taken. Discussed progress made with therapy. Educated to continue with bandaging, skin care, and ambulation/HEP    Pt education - educated on compression bandaging with graduated compression vs compression garment. Discussed possibility of zippered compression socks once edema decreases to allow for increased independence with donning compression. Pt states he will contact insurance regarding paying for compression garment. Educated on cutting top of elastic on socks to reduce constriction. Cut socks present this date. Pt provided with notes for nursing staff with importance of daily bandaging and lymphedema pump to decrease edema and reduce risk for infection and wounds. Pt reports R knee pain with ext for 1 hr during lymphedema pump. Advised pt to keep ext for as long as tolerable to improve R knee ext and reduce risk for further knee flexion contractures. Instructed he can use folded pillow under knee for last 10 min of pump when knee ext is not tolerable and take away immediately following pump. X 20 min total  10/4 LLE only this date due to only RLE wrapped upon arrival. Pt forgot other bandages at home.                         11/2 RLE only as pt only brought 2 bandages, instructed to have nurse wrap when he returns home  7/26 educated pt and Arelis (wound care nurse) on bandaging    Ambulation  1x100';  with RW and CGA/SBA      9/8 deferred due to time constrains - assisted pt to wash B LE and applied lotion    9/6 gait limited by fatigue 7/21 pt SOB after   HEP   - LLD knee ext stretch   - quad set   - ankle pump  - LAQ      Transfer w/c <> bed - SBA  Transfer sit to/form stand at wc  Transfer sit <> supine X 1   11/1 VC for hand placement with STS on bed and knee ext in standing    Nu step   S=11, arms =10, workload =3 10' min  Sci fit s=19, arms =6    8/16 deferred due to possible cellulitis    Wearing multilayer bandaging   Rest break     9/29, 9/27, 9/229/8, 8/30 Unwrapped B LE's. Educated on bandaging up to knee crease. Washed B lower legs with gentle soap and applied lotion. Educated on importance of skin health and keeping legs clean and moisturized. 9/15, 9/13 Washed B lower legs with gentle soap and applied lotion. Educated on importance of skin health and keeping legs clean and moisturized. Wrote notes for Pt's nurse.  Provided with phone number for [facility] medical to obtain home lymphedema pump as he has not been in contact with them recently                         9/13 education partially completed during MLD    Home Management  (providing pt education on safety procedures/instructions)       Pt educated on compression as follows:   how to appropriately apply and wear compression  how to maintain appropriate gradient compression  do not sleep with compression garment/tensoshape  signs and symptoms of constriction   when to remove compression       Pt educated on lymphedema prevention and management    7/12 completed during MLD    Pt educated on MLD                            Neuromuscular Re-ed (57417)       balance                     Manual Intervention (57099)      See MLD flowchart below   X40' min   7/21 deferred due to concern re possible cellulitis                                        Manual Lymph Drainage (MLD):  MLD to B LE , clearing along alternate pathway of  Ipsilateral trunk  to  Ipsilateral axillary  lymph nodes    Clear Nodes 10x each   Neck x   [address]    Axilla x   Abdomen x   Groin x   Popliteal x2 x   Clear Alternate Pathway 10x each   Re-clear alternate pathway   x5 each position x   Location Ipsilateral trunk       Fluid Mobilization 10x each   Re-clear alternate pathway   x5 each position x   Shoulder bracing    Location B LE and ipsilateral trunk       Protein Resorption 10x each   Location LE below knee       Clear Foot/Ankle or Hand 10x each   Achilles x   Bilateral malleolus x   Fan the cards x   Clear dorsum x   Clear through web space x   Clear toes/fingers x   Fluid mobilization x   Re-clear all positions  X5 each x       Modalities:     OTHER:   11/15 PT called [facility] at [phone] and sent note to [facility] to advise: left message for Julia Stratton  PT to call PT back. [email or web address]  who went to look for [employer]  PT communicated with SHANIA Peoples about pt's needs:   he needs daily wrapping, daily use of pump and daily re-wrapping after use of pump - or is at increased risk of new wounds and lymphedema will flare up, making fxnl mobility more difficult. Pt's lymphedema will not get better until/unless he gets daily compression to counteract effects of gravity. After 3 wks of consistent daily compression, pt will be ready and appropriate for fitting of custom compression garments which will be easier to don and doff than the bandages. He will need to continue with daily wrapping until he gets custom compression garments. D/w pt that his best strategy to get the care he needs might be going thru [place] on aging. Valerie Colón is agreeable to work hard to make all this happen for pt when she is on his unit and she will communicate all  this to the other staff.    Pt also needs daily cardio ex - walking and will benefit form use of seated stepping machine  Assisted pt to call [employer] on aging - put on hold x 10 min - unable to get thru - pt agreeable to call back later when he has time to be on hold for a long time. Howard PT called PT back at approximately 200pm -  discussed that PT sent referral for PT tx at [facility] for fxnl mobility when PT writes DC note from lymphedema PT tx.      11/8 sent note o [facility]:  Requested RNs at [facility] wrap pt's LE daily to prevent flare up of lymphedema and new onset wounds. PT called [facility] point at  307pm [phone] to discuss need for pt to be bandaged daily to prevent flare up and new wounds. The phone rang until PT hung up at conclusion of pt appt at 315pm      10/6 note to [facility]:  Requested RNs at [facility] add increased gradient compression at toes and feet  9/29 PT sent another note to pt's facility re please re-wrap, lotion and wash LE daily. ,  asked staff to use new tape when re-wrapping.       9/22 pt ed re how to advocate for himself for LE washing and bandaging - pt agreeable. D/w pt PT will call [facility] point during next session if needed. 9/20 Joao Lu, PT, DPT contacted [facility] and spoke with Haider Mcfarlane, SHANIA regarding need for compression daily when lower extremities are in a dependent position. Phone call transferred to Antonio Mejias, PT, DPT who spoke with wound care nurse, Mona Stephens regarding pt's compression. Discussed need for skin care and bandaging daily with gradient compression wrapping from distal to proximal from toes to just below knee. Mona Stephens states this is the current POC. PT provided pt with note requesting daily skin care and compression distal to proximal.      9/15 gave pt written note requesting daily wound care and daily bandage changes. Advised we are happy to teach facility staff how to wrap. Advised pt to contact Lexis bermeo [facility] to schedule pump demo.  Advised pt to look into should he request (and pay for ) additional 1-2 units of ADLs assist a day so staff can/will wrap his legs? Pt is agreeable    9/8 gave pt handout for RN at facility requesting pt get daily assist with lower body bathing, application of lotion to LE and bandaging of LE    9/6 gave pt handout with written bandaging directions fr new RN at facility    8/16/22 1121am PT called Patricio Trevino director of nursing next at [phone] re pt- discussed concern re possible new onset cellulitis R  LE. She will call pt;'s MD re possible start of antibiotics for cellulitis. 7/21 pt signed release of info form for PT to communicate with medical staff at [facility] ([facility])     7/21 PT called Mansfield Boas NP  re pt- discussed concern re possible new onset cellulitis R and or L LE, new onset wounds, inappropriate compression gradient with 2 tensoshapes that rolled. Sergio Gasper  states she will call in antibiotic. At pt request, PT called RN Bharathi Kumar [phone]; unable to LM due to VM full. Called Lisa director of nursing next at [phone] - no answer, no VM. PT wrote note to RNs a pt's independent living facility re need for approp compression gradient and pt will order lymphedema bandages - low stretch 2x8cm, 4x12 cm, 2x15 cm artiflex. 6/30 pt signed release of info for PT to send PT notes and insurance info to [facility] re possibility of getting lymphedema pump for B LE    Pt education:   11/15 review HEP and strategies for pt to request assist of staff at [facility] - he needs daily wrapping, daily use of pump and daily re-wrapping after use of pump - or is at increased risk of new wounds and lymphedema will flare up, making fxnl mobility more difficult. Pt's lymphedema will not get better until/unless he gets daily compression to counteract effects of gravity.  After 3 wks of consistent daily compression, pt will be ready and appropriate for fitting of custom compression garments which will be easier to don and doff than the bandages. He will need to continue with daily wrapping until he gets custom compression garments. D/w pt that his best strategy to get the care he needs might be going thru [place] on aging. Pt also needs daily cardio ex - walking and will benefit form use of seated stepping machine    9/22, 9/8 pt ed on approp skin care and reveiwed re approp bandaging  8/9 review HEP - see below d/w pt benefit of rewrapping daily, how to void constriction marks and gradually increasing walking duration/distance- wrote this on his note to RN  7/21 extensive pt ed re appropriate compression and compression gradient, need to avoid constriction    6/30   pt ed and provided with form on what compression wrappings to purchase. Ed on ankle pumps and toe flx/ext to stimulate lymphatic flow. Pt educated on pathology and anatomy of etiology of lymphedema, condition precautions, indications for long term prognosis. Pt was educated on diagnosis; prognosis; PT POC including MLD, compression (role of multilayer bandaging/ compression garments), lymphedema management/prevention of flare ups, role of exercise, HEP, lymphedema pump; expectations for rehab. All pt questions were answered. HEP instruction:  11/15 daily wrapping, daily use of pump, daily re-wrapping after use of pump. 9/6 extra foam under wraps    8/9 increase duration of walks by 15-60 sec; goal - increase walk duration so he can do 1-2 laps at facility. Re-wrap daily as able- trial of grey foam at ankles    7/26   Access Code: [email or web address]  URL: [email or web address]  Date: 07/26/2022  Prepared by: Norma Agosto    Exercises  Long Sitting Quad Set - 3 x daily - 7 x weekly - 1 sets - 10 reps - 5 sec hold  Supine Knee Extension Stretch on Towel Roll - 3 x daily - 7 x weekly - 5-6 min hold    7/21 pt to order low stretch compression bandages: 2x8cm, 4x12 cm and artiflex 2x15 cm and bring to next appt. and LE with self care and ADLs  [] UE / Cervical: cervical, postural, scapular, scapulothoracic and UE control with self care, carrying, lifting, driving, computer work.   [] (46919) Gait Re-education- Provided training and instruction to the patient for proper LE, core and proximal hip recruitment and positioning and eccentric body weight control with ambulation re-education including up and down stairs     Home Management Training / Self Care:  [] (01416) Provided self-care/home management training related to activities of daily living and compensatory training, and/or use of adaptive equipment for improvement with: ADLs and compensatory training, meal preparation, safety procedures and instruction in use of adaptive equipment, including bathing, grooming, dressing, personal hygiene, basic household cleaning and chores.      Home Exercise Program:    [] (45356) Reviewed/Progressed HEP activities related to strengthening, flexibility, endurance, ROM of   [] LE / Lumbar: core, proximal hip and LE for functional self-care, mobility, lifting and ambulation/stair navigation   [] UE / Cervical: cervical, postural, scapular, scapulothoracic and UE control with self care, reaching, carrying, lifting, house/yardwork, driving, computer work  [] (26268)Reviewed/Progressed HEP activities related to improving balance, coordination, kinesthetic sense, posture, motor skill, proprioception of   [] LE: core, proximal hip and LE for self care, mobility, lifting, and ambulation/stair navigation    [] UE / Cervical: cervical, postural,  scapular, scapulothoracic and UE control with self care, reaching, carrying, lifting, house/yardwork, driving, computer work    Manual Treatments:  PROM / STM / Oscillations-Mobs:  G-I, II, III, IV (PA's, Inf., Post.)  [x] (10608) Provided manual therapy to mobilize LE, proximal hip and/or LS spine soft tissue/joints for the purpose of modulating pain, promoting relaxation,  increasing ROM, reducing/eliminating soft tissue swelling/inflammation/restriction, improving soft tissue extensibility and allowing for proper ROM for normal function with   [x] LE / lumbar: self care, mobility, lifting and ambulation. [] UE / Cervical: self care, reaching, carrying, lifting, house/yardwork, driving, computer work. Modalities:  [] (95984) Vasopneumatic compression: Utilized vasopneumatic compression to decrease edema / swelling for the purpose of improving mobility and quad tone / recruitment which will allow for increased overall function including but not limited to self-care, transfers, ambulation, and ascending / descending stairs. Charges:  Timed Code Treatment Minutes: 90   Total Treatment Minutes: 90     [] EVAL - LOW (23717)   [] EVAL - MOD (47848)  [] EVAL - HIGH (74544)  [] RE-EVAL (63895)  [x] EE(57331) x   1    [] Ionto  [] NMR (13777) x       [] Vaso  [x] Manual (08343) x   2   [] Ultrasound  [x] TA x  2    [] Mech Traction (19903)  [] Aquatic Therapy x     [] ES (un) (00157):   [] Home Management Training x  [] ES(attended) (07462)   [] Dry Needling 1-2 muscles (26264):  [] Dry Needling 3+ muscles (582739  [] Group:      [x] Other: gaitx1    GOALS:  Patient stated goal: \"Pt wants to be able to stand with his walker and walk household distances\"  [x] Progressing: [] Met: [] Not Met: [] Adjusted     Therapist goals for Patient:   Short Term Goals: To be achieved in: 2 weeks  1. Independent in HEP and progression per patient tolerance, in order to prevent return of swelling   [] Progressing: [x] Met: [] Not Met: [] Adjusted  2. Patient will have a decrease in swelling/pain to facilitate improvement in movement, function, and ADLs as indicated by improvement with LLIS. [] Progressing: [x] Met: [] Not Met: [] Adjusted     Long Term Goals: To be achieved in: 6 weeks  1. FOTO score of at least 46 to assist with reaching prior level of function.   [] Progressing: [x] Met: [] Not Met: [] Adjusted  2. Patient will demonstrate increased AROM/strength of BLE to 4/5 so that pt can resume walking and standing without increase in symptoms. [] Progressing: [x] Met: [] Not Met: [] Adjusted  3. Decrease swelling of BLEs by 5cm so that pt can return to functional activities including standing, walking, squatting, and lifting without increased symptoms or restriction. [] Progressing: [x] Met: MET RLE [] Not Met: [] Adjusted  4. Patient will be able to stand for >/= 5 minutes in order to improve standing tolerance for performing ADLs. [] Progressing: [x] Met: [] Not Met: [] Adjusted  NEEDS SOMETHING NEAR BY FOR UE SUPPORT IN ORDER TO STAND FOR 5 + MIN  5. Patient will be able to ambulate short community distances (300-500ft) with the LRAD to improve functional mobility in his home and community. [x] Progressing: [] Met: [] Not Met: [] Adjusted  NEW GOAL SET 10/6 reduce swelling so that pt can wear shoes with appropriate LE compression without increased symptoms. [] Progressing: [x] Met: [] Not Met: [] Adjusted PATIENT WEARING GYM SHOES TODAY  NEW GOAL SET 10/6 increase stand daisy to 5-10 min with minimal to no intermittent  UE support. [] Progressing: [x] Met: [] Not Met: [] Adjusted TOLS appox 5-6  min standing  NEW GOAL SET: pt to be independent with advanced HEP to appropriately manage lymphedema - to include don/doff advanced lymphedema pump with minimal to no assist of staff at [facility]. MET WITH ASSIST OF  [facility] STAFF FOR DON? DOFF LYMPH PUMP AND WRAPPING  [] Progressing: [x] Met: [] Not Met: [] Adjusted    Overall Progression Towards Functional goals/ Treatment Progress Update:  [x] Patient is progressing as expected towards functional goals listed. [] Progression is slowed due to complexities/Impairments listed. [] Progression has been slowed due to co-morbidities.   [] Plan just implemented, too soon to assess goals progression <30days   [] Goals require adjustment due to lack of progress  [] Patient is not progressing as expected and requires additional follow up with physician  [] Other    Persisting Functional Limitations/Impairments:  []Sleeping []Sitting               [x]Standing [x]Transfers        [x]Walking [x]Kneeling               [x]Stairs [x]Squatting / bending   [x]ADLs []Reaching  [x]Lifting  [x]Housework  []Driving []Job related tasks  []Sports/Recreation []Other:        ASSESSMENT:  12/15 see PT POC above  11/15he needs daily wrapping, daily use of pump and daily re-wrapping after use of pump - or is at increased risk of new wounds and lymphedema will flare up, making fxnl mobility more difficult. Pt's lymphedema will not get better until/unless he gets daily compression to counteract effects of gravity. After 3 wks of consistent daily compression, pt will be ready and appropriate for fitting of custom compression garments which will be easier to don and doff than the bandages. He will need to continue with daily wrapping until he gets custom compression garments. D/w pt that his best strategy to get the care he needs might be going thru [facility]. Pt will benefit from PT tx for increasing fxnl mobility when he is d/c'd from lymphedema PT tx.     11/8 overall excellent progress with swelling, fxnl mobility, and health of skin. No obvious wounds today. But, R LE with increased swelling today by 21.1cm today compared to last month. Today's swelling is similar to swelling R LE in aug-sept 2022. This flare up is due to decreased frequency of low stretch compression bandaging since staffing changes at pt's facility about 6 wks ago. L LE total girth increased by 1.4 cm compared to last month  While pt reports significant improvement with RNs/staff helping him with use of lymph pump and appropriate wrapping of LE, pt only gets bandaged approx every other day, even when his LEs in a dependent position.  This has resulted in recent flare up of swelling, pitting edema, redness, dry skin. 11/2 Pt with increased edema in B lower legs, increased redness in R lower leg, and more fibrotic tissue compared to LV due to poor compliance with home program due to lack of assistance from staffing at pt's residence. Pt reports compliance improved in last 3 days. Pt provided with note from PT for nursing staff with importance of low stretch bandaging distal to proximal and use of lymphedema pump daily to reduce risk for wounds and infection. Phone number provided to call with questions. RLE only wrapped this date due to pt bringing 1 set of bandages. Added artiflex for extra layer of compression as wounds are now healed. Continue with MLD, compression, exercise, and home program to reduce edema to improve function and transition to compression garment. 10/11 Pt demonstrates improve posture with amb requiring less frequent cues for upright trunk and knee ext. Pt continues to sit on bed without bringing Ue's to bed. Discussed completing safe transfers at home and in clinic with appropriate hand placement. Pt with all wounds healed. Did not use horseshoe at B ankle as no deep skin folds notes. Continued to use foam on dorsum of feet. Continue with MLD, compression, and exercise to decrease edema and improve functional mobility. 10/6 pt cont to progress well - increased daisy of walking and LE ex today. Set new goals for cont progression with tx of lymphedema. Improved swelling - worst swelling now at toes and dorsal feet R>L-- added trial of tensoshape to feet and toes today to address this. Requested RNs at [address] add increased gradient compression at toes and feet    10/4 POC completed this date. Pt presents with decreased edema in BLE compared to last PN and eval. Pt with wounds healed on RLE. Pt continues with edema in B LE, especially feet and ankles.  Increased edema in LLE compared to LV as pt was unwrapped for about 1.5 days due to report of nursing staff at his home reporting they are only wrapping RLE as it was followed by wound care. Pt provided with note from PT requesting BLE bandages and both bandages wrapped distal to proximal. Pt will benefit from additional OP PT to further decrease edema in BLE and increase focus on functional mobility. 9/29 excellent response to the flexitouch trial with [facility] from [facility] on 9/23/22. Overall pt's LE cont to improve. Pt would greatly benefit for improved consistency with appropriate wrapping - with appropriate compression gradient, cleaning of LE at his facility. Pt will benefit form regular use of advanced lymph pump. He might benefit form trial of circaids for feet and LE as they would be easier to apply and should be adequate to provide approp compression. 9/27 Pt further educated on importance of stretching R knee into ext as he continues to lack full knee ext with amb. Completed skin care this date. Pt with decreased skin dryness and skin irritation compared to previous visit. Pt has been complaint with over 4 weeks of compression, elevation, and exercise, and edema in BLE and trunk remains.  Pt will benefit from advanced lymphedema pump due to truncal swelling and increased abdominal measurement following basic pump trial.     Per [facility] from [facility] lymphedema pump trial:    Patient trialed the basic pump for about 15 mins @ 30mmHg pressure on the right leg, below are the results:     Before/ after/ difference:   Abdomen: 134.0cm/ 134.1cm/ +0.1cm   Thigh: 60.0cm/ 60.0cm/ +/- 0cm  Knee: 54.1cm/ 53.8cm/ -0.30cm  Calf: 41.9cm/ 41.9cm/ +/- 0cm  Ankle: 38.7cm/ 38.6cm/ -0.1cm     Patient then trialed the [address] on the right leg and abdomen at normal pressure for about 15 minutes, below are the results:     Before/ after/ difference:  Abdomen: 134.1cm/ 133.7cm/ -0.4cm  Thigh: 60.0 cm/ 59.1cm/ -0.9cm  Knee: 53.8cm/ 53.7cm/ -0.1cm  Calf: 41.9cm/ 40.8cm/ -1.1cm   Ankle: 38.6cm/ 38.3cm/ -0.3cm    9/22 pt appears to have abdominal swelling. If this persists, he would benefit from advanced lymph pump to include abdomen  pt feels that staff at [facility] are trying and learning how to take care of his legs. Pt advocating for himself as best as he can and receptive to instruction to cont to do so. Pt cont to benefit form compression , walking MLD. Will benefit greatly form lymph pump to use daily/most days    9/20 Pt with skin irritation around bandage on R dorsum of foot. Contacted staff at [facility] this date regarding appropriate skin care and bandaging. See above. Pt provided with note regarding distal to proximal compression and daily skin care. Continue with MLD, compression, and addition of home lymphedema pump to further decrease edema and fibrotic tissue. 9/15 New wound R ant shin: [id number] scab surrounded by [id number] red area. Increased pitting edema today - attribute this to staff at [facility] no longer wrapping his LE daily. Pt very frustrated by decreased wound and lymphedema care. Pt talking to head RN about it . Gave pt contact info for [facility] but advised pt to be careful how he approaches this   States he talked to [address] [place] [address] director of nursing on 9/13 regarding new wound care nurse - she told pt she would check into everything - daily wound care and daily wrapping. Pt reports he hasn't heard back from her.    9/13 Washed BLE and applied lotion as pt with dry skin on B lower legs and feet, R LE > L LE. Provided notes for nursing staff at pt's home to wash and moisturize legs daily and wrap prior to NV to allow PT to assess bandaging technique. Pt provided with phone number for [facility] medical to call regarding lymphedema pump trial as he has not been in contact with them recently. Continue with MLD, exercise, compression, and skin care to decrease edema and improve functional mobility. 9/8 pt needs assist with consistent and excellent skin care and daily bandaging.  Staff turnover/changes at his facility has negatively impacted the health of his skin     9/6 swelling B LE much improved except significant swelling of feet and toes cont to be a problem R>L. Continues with wound R dorsal foot. Lymph pump should help prevent future wounds - pt reports hx of wounds on LE and infections since 2017. trial of  bandaging with extra foam today - to cover larger area at ankles and entire dorsum of foot - including dorsal toes    9/1 PN completed this date. Pt with significant decrease in edema in RLE this date with only 1 wound on dorsal ankle crease. Slight decreased edema in LLE. Pt with improved skin health with decreased skin dryness. Educated to continue with moisturizing, bandaging, and exercise to further decrease edema in B LE's and improve functional mobility. Treatment/Activity Tolerance:  [x] Patient able to complete tx [] Patient limited by fatigue  [] Patient limited by pain  [] Patient limited by other medical complications  [] Other:     Prognosis: [x] Good [] Fair  [] Poor    Patient Requires Follow-up: [x] Yes  [] No    Plan for next treatment session: d/c with advanced HEP and assist of staff at [address] to apply bandages appropriately and assist to use pump daily. P: MLD, compression - bandage B LE below knee , HEP, pt education, lymph pump  B LE, lymph pump for home to help prevent chronic wounds R>L LE, exercise to stimulate lymphatic flow, LE ex, balance, gait, fxnl mobility    PLAN: See eval. PT 2x / week for 6 weeks. + 2x/wk for 6 wks   [x] Continue per plan of care [] Alter current plan (see comments)  [] Plan of care initiated [] Hold pending MD visit [] Discharge    Electronically signed by: Pamela Mckeon PT, DPT      Note: If patient does not return for scheduled/ recommended follow up visits, this note will serve as a discharge from care along with most recent update on progress.

## 2024-02-12 ENCOUNTER — APPOINTMENT (OUTPATIENT)
Dept: CT IMAGING | Age: 79
End: 2024-02-12
Payer: COMMERCIAL

## 2024-02-12 ENCOUNTER — HOSPITAL ENCOUNTER (INPATIENT)
Age: 79
LOS: 5 days | Discharge: NURSING, REHAB OR LONG-TERM CARE | End: 2024-02-17
Attending: EMERGENCY MEDICINE | Admitting: INTERNAL MEDICINE
Payer: COMMERCIAL

## 2024-02-12 DIAGNOSIS — R13.19 ESOPHAGEAL DYSPHAGIA: ICD-10-CM

## 2024-02-12 DIAGNOSIS — R93.89 ABNORMAL CHEST CT: ICD-10-CM

## 2024-02-12 DIAGNOSIS — I26.99 ACUTE PULMONARY EMBOLISM WITHOUT ACUTE COR PULMONALE, UNSPECIFIED PULMONARY EMBOLISM TYPE (HCC): ICD-10-CM

## 2024-02-12 DIAGNOSIS — R91.8 MASS OF LOWER LOBE OF LEFT LUNG: Primary | ICD-10-CM

## 2024-02-12 PROBLEM — N28.89 LEFT KIDNEY MASS: Status: ACTIVE | Noted: 2024-02-12

## 2024-02-12 PROBLEM — R13.10 DYSPHAGIA: Status: ACTIVE | Noted: 2024-02-12

## 2024-02-12 PROBLEM — N18.30 CKD (CHRONIC KIDNEY DISEASE) STAGE 3, GFR 30-59 ML/MIN (HCC): Status: ACTIVE | Noted: 2024-02-12

## 2024-02-12 PROBLEM — I10 HTN (HYPERTENSION): Status: ACTIVE | Noted: 2024-02-12

## 2024-02-12 LAB
ALBUMIN SERPL-MCNC: 3.1 G/DL (ref 3.4–5)
ALBUMIN/GLOB SERPL: 0.7 {RATIO} (ref 1.1–2.2)
ALP SERPL-CCNC: 96 U/L (ref 40–129)
ALT SERPL-CCNC: 11 U/L (ref 10–40)
ANION GAP SERPL CALCULATED.3IONS-SCNC: 17 MMOL/L (ref 3–16)
ANTI-XA UNFRAC HEPARIN: <0.1 IU/ML (ref 0.3–0.7)
ANTI-XA UNFRAC HEPARIN: >1.1 IU/ML (ref 0.3–0.7)
APTT BLD: 40.2 SEC (ref 22.7–35.9)
APTT BLD: >180 SEC (ref 22.7–35.9)
AST SERPL-CCNC: 17 U/L (ref 15–37)
BASOPHILS # BLD: 0.1 K/UL (ref 0–0.2)
BASOPHILS NFR BLD: 0.9 %
BILIRUB SERPL-MCNC: 0.5 MG/DL (ref 0–1)
BUN SERPL-MCNC: 43 MG/DL (ref 7–20)
CALCIUM SERPL-MCNC: 10.6 MG/DL (ref 8.3–10.6)
CHLORIDE SERPL-SCNC: 96 MMOL/L (ref 99–110)
CO2 SERPL-SCNC: 19 MMOL/L (ref 21–32)
CREAT SERPL-MCNC: 1.4 MG/DL (ref 0.8–1.3)
DEPRECATED RDW RBC AUTO: 14 % (ref 12.4–15.4)
DEPRECATED RDW RBC AUTO: 14.1 % (ref 12.4–15.4)
DEPRECATED RDW RBC AUTO: 14.5 % (ref 12.4–15.4)
EOSINOPHIL # BLD: 0.2 K/UL (ref 0–0.6)
EOSINOPHIL NFR BLD: 2.8 %
GFR SERPLBLD CREATININE-BSD FMLA CKD-EPI: 51 ML/MIN/{1.73_M2}
GLUCOSE SERPL-MCNC: 73 MG/DL (ref 70–99)
HCT VFR BLD AUTO: 36.8 % (ref 40.5–52.5)
HCT VFR BLD AUTO: 39.6 % (ref 40.5–52.5)
HCT VFR BLD AUTO: 42.7 % (ref 40.5–52.5)
HGB BLD-MCNC: 12.4 G/DL (ref 13.5–17.5)
HGB BLD-MCNC: 13.1 G/DL (ref 13.5–17.5)
HGB BLD-MCNC: 14 G/DL (ref 13.5–17.5)
INR PPP: 1.21 (ref 0.84–1.16)
INR PPP: 1.32 (ref 0.84–1.16)
LYMPHOCYTES # BLD: 2.6 K/UL (ref 1–5.1)
LYMPHOCYTES NFR BLD: 31.3 %
MCH RBC QN AUTO: 30.1 PG (ref 26–34)
MCH RBC QN AUTO: 30.6 PG (ref 26–34)
MCH RBC QN AUTO: 30.7 PG (ref 26–34)
MCHC RBC AUTO-ENTMCNC: 32.7 G/DL (ref 31–36)
MCHC RBC AUTO-ENTMCNC: 33.2 G/DL (ref 31–36)
MCHC RBC AUTO-ENTMCNC: 33.6 G/DL (ref 31–36)
MCV RBC AUTO: 91.1 FL (ref 80–100)
MCV RBC AUTO: 92.1 FL (ref 80–100)
MCV RBC AUTO: 92.5 FL (ref 80–100)
MONOCYTES # BLD: 0.9 K/UL (ref 0–1.3)
MONOCYTES NFR BLD: 11.6 %
NEUTROPHILS # BLD: 4.4 K/UL (ref 1.7–7.7)
NEUTROPHILS NFR BLD: 53.4 %
PLATELET # BLD AUTO: 342 K/UL (ref 135–450)
PLATELET # BLD AUTO: 349 K/UL (ref 135–450)
PLATELET # BLD AUTO: 357 K/UL (ref 135–450)
PLATELET BLD QL SMEAR: ADEQUATE
PMV BLD AUTO: 8.8 FL (ref 5–10.5)
PMV BLD AUTO: 8.9 FL (ref 5–10.5)
PMV BLD AUTO: 9.5 FL (ref 5–10.5)
POTASSIUM SERPL-SCNC: 5.5 MMOL/L (ref 3.5–5.1)
PROT SERPL-MCNC: 7.7 G/DL (ref 6.4–8.2)
PROTHROMBIN TIME: 15.3 SEC (ref 11.5–14.8)
PROTHROMBIN TIME: 16.4 SEC (ref 11.5–14.8)
RBC # BLD AUTO: 4.05 M/UL (ref 4.2–5.9)
RBC # BLD AUTO: 4.28 M/UL (ref 4.2–5.9)
RBC # BLD AUTO: 4.63 M/UL (ref 4.2–5.9)
SLIDE REVIEW: NORMAL
SODIUM SERPL-SCNC: 132 MMOL/L (ref 136–145)
WBC # BLD AUTO: 7.6 K/UL (ref 4–11)
WBC # BLD AUTO: 8.2 K/UL (ref 4–11)
WBC # BLD AUTO: 8.2 K/UL (ref 4–11)

## 2024-02-12 PROCEDURE — 85520 HEPARIN ASSAY: CPT

## 2024-02-12 PROCEDURE — 6360000002 HC RX W HCPCS: Performed by: EMERGENCY MEDICINE

## 2024-02-12 PROCEDURE — 94640 AIRWAY INHALATION TREATMENT: CPT

## 2024-02-12 PROCEDURE — 71260 CT THORAX DX C+: CPT

## 2024-02-12 PROCEDURE — 80053 COMPREHEN METABOLIC PANEL: CPT

## 2024-02-12 PROCEDURE — 85025 COMPLETE CBC W/AUTO DIFF WBC: CPT

## 2024-02-12 PROCEDURE — 99285 EMERGENCY DEPT VISIT HI MDM: CPT

## 2024-02-12 PROCEDURE — 85730 THROMBOPLASTIN TIME PARTIAL: CPT

## 2024-02-12 PROCEDURE — 1200000000 HC SEMI PRIVATE

## 2024-02-12 PROCEDURE — 94760 N-INVAS EAR/PLS OXIMETRY 1: CPT

## 2024-02-12 PROCEDURE — 36415 COLL VENOUS BLD VENIPUNCTURE: CPT

## 2024-02-12 PROCEDURE — 85610 PROTHROMBIN TIME: CPT

## 2024-02-12 PROCEDURE — 6370000000 HC RX 637 (ALT 250 FOR IP): Performed by: INTERNAL MEDICINE

## 2024-02-12 PROCEDURE — 2580000003 HC RX 258: Performed by: INTERNAL MEDICINE

## 2024-02-12 PROCEDURE — 6360000004 HC RX CONTRAST MEDICATION: Performed by: EMERGENCY MEDICINE

## 2024-02-12 PROCEDURE — 85027 COMPLETE CBC AUTOMATED: CPT

## 2024-02-12 PROCEDURE — 70491 CT SOFT TISSUE NECK W/DYE: CPT

## 2024-02-12 RX ORDER — SODIUM CHLORIDE 9 MG/ML
INJECTION, SOLUTION INTRAVENOUS CONTINUOUS
Status: ACTIVE | OUTPATIENT
Start: 2024-02-12 | End: 2024-02-14

## 2024-02-12 RX ORDER — ONDANSETRON 2 MG/ML
4 INJECTION INTRAMUSCULAR; INTRAVENOUS EVERY 6 HOURS PRN
Status: DISCONTINUED | OUTPATIENT
Start: 2024-02-12 | End: 2024-02-17 | Stop reason: HOSPADM

## 2024-02-12 RX ORDER — FLUTICASONE PROPIONATE 50 MCG
1 SPRAY, SUSPENSION (ML) NASAL 2 TIMES DAILY
Status: DISCONTINUED | OUTPATIENT
Start: 2024-02-12 | End: 2024-02-17 | Stop reason: HOSPADM

## 2024-02-12 RX ORDER — SODIUM CHLORIDE 0.9 % (FLUSH) 0.9 %
5-40 SYRINGE (ML) INJECTION PRN
Status: DISCONTINUED | OUTPATIENT
Start: 2024-02-12 | End: 2024-02-17 | Stop reason: HOSPADM

## 2024-02-12 RX ORDER — ONDANSETRON 4 MG/1
4 TABLET, ORALLY DISINTEGRATING ORAL EVERY 8 HOURS PRN
Status: DISCONTINUED | OUTPATIENT
Start: 2024-02-12 | End: 2024-02-17 | Stop reason: HOSPADM

## 2024-02-12 RX ORDER — NICOTINE 21 MG/24HR
1 PATCH, TRANSDERMAL 24 HOURS TRANSDERMAL DAILY
Status: DISCONTINUED | OUTPATIENT
Start: 2024-02-12 | End: 2024-02-17 | Stop reason: HOSPADM

## 2024-02-12 RX ORDER — HEPARIN SODIUM 10000 [USP'U]/100ML
5-30 INJECTION, SOLUTION INTRAVENOUS CONTINUOUS
Status: DISCONTINUED | OUTPATIENT
Start: 2024-02-12 | End: 2024-02-13

## 2024-02-12 RX ORDER — UMECLIDINIUM 62.5 UG/1
1 AEROSOL, POWDER ORAL DAILY
COMMUNITY

## 2024-02-12 RX ORDER — AMOXICILLIN AND CLAVULANATE POTASSIUM 875; 125 MG/1; MG/1
1 TABLET, FILM COATED ORAL 2 TIMES DAILY
Status: ON HOLD | COMMUNITY
Start: 2024-02-06 | End: 2024-02-17 | Stop reason: HOSPADM

## 2024-02-12 RX ORDER — METHOCARBAMOL 750 MG/1
750 TABLET, FILM COATED ORAL 3 TIMES DAILY PRN
COMMUNITY

## 2024-02-12 RX ORDER — HEPARIN SODIUM 1000 [USP'U]/ML
80 INJECTION, SOLUTION INTRAVENOUS; SUBCUTANEOUS ONCE
Status: DISCONTINUED | OUTPATIENT
Start: 2024-02-12 | End: 2024-02-12 | Stop reason: SDUPTHER

## 2024-02-12 RX ORDER — ACETAMINOPHEN 325 MG/1
650 TABLET ORAL EVERY 6 HOURS PRN
Status: DISCONTINUED | OUTPATIENT
Start: 2024-02-12 | End: 2024-02-17 | Stop reason: HOSPADM

## 2024-02-12 RX ORDER — TAMSULOSIN HYDROCHLORIDE 0.4 MG/1
0.4 CAPSULE ORAL DAILY
Status: DISCONTINUED | OUTPATIENT
Start: 2024-02-13 | End: 2024-02-17 | Stop reason: HOSPADM

## 2024-02-12 RX ORDER — ONDANSETRON 4 MG/1
4 TABLET, FILM COATED ORAL EVERY 8 HOURS PRN
COMMUNITY

## 2024-02-12 RX ORDER — FERROUS SULFATE 325(65) MG
325 TABLET ORAL
COMMUNITY

## 2024-02-12 RX ORDER — ESCITALOPRAM OXALATE 10 MG/1
10 TABLET ORAL DAILY
Status: DISCONTINUED | OUTPATIENT
Start: 2024-02-13 | End: 2024-02-17 | Stop reason: HOSPADM

## 2024-02-12 RX ORDER — HEPARIN SODIUM 1000 [USP'U]/ML
40 INJECTION, SOLUTION INTRAVENOUS; SUBCUTANEOUS PRN
Status: DISCONTINUED | OUTPATIENT
Start: 2024-02-12 | End: 2024-02-12 | Stop reason: SDUPTHER

## 2024-02-12 RX ORDER — GUAIFENESIN AND DEXTROMETHORPHAN HYDROBROMIDE 100; 10 MG/5ML; MG/5ML
5 SOLUTION ORAL EVERY 6 HOURS PRN
COMMUNITY

## 2024-02-12 RX ORDER — HEPARIN SODIUM 1000 [USP'U]/ML
40 INJECTION, SOLUTION INTRAVENOUS; SUBCUTANEOUS PRN
Status: DISCONTINUED | OUTPATIENT
Start: 2024-02-12 | End: 2024-02-13

## 2024-02-12 RX ORDER — POLYETHYLENE GLYCOL 3350 17 G/17G
17 POWDER, FOR SOLUTION ORAL PRN
Status: DISCONTINUED | OUTPATIENT
Start: 2024-02-12 | End: 2024-02-12 | Stop reason: SDUPTHER

## 2024-02-12 RX ORDER — GUAIFENESIN/DEXTROMETHORPHAN 100-10MG/5
5 SYRUP ORAL EVERY 6 HOURS PRN
Status: DISCONTINUED | OUTPATIENT
Start: 2024-02-12 | End: 2024-02-17 | Stop reason: HOSPADM

## 2024-02-12 RX ORDER — ERGOCALCIFEROL 1.25 MG/1
50000 CAPSULE ORAL WEEKLY
Status: DISCONTINUED | OUTPATIENT
Start: 2024-02-15 | End: 2024-02-17 | Stop reason: HOSPADM

## 2024-02-12 RX ORDER — POTASSIUM CHLORIDE 7.45 MG/ML
10 INJECTION INTRAVENOUS PRN
Status: DISCONTINUED | OUTPATIENT
Start: 2024-02-12 | End: 2024-02-17 | Stop reason: HOSPADM

## 2024-02-12 RX ORDER — FERROUS SULFATE 325(65) MG
325 TABLET ORAL
Status: DISCONTINUED | OUTPATIENT
Start: 2024-02-13 | End: 2024-02-17 | Stop reason: HOSPADM

## 2024-02-12 RX ORDER — METHOCARBAMOL 750 MG/1
750 TABLET, FILM COATED ORAL 3 TIMES DAILY PRN
Status: DISCONTINUED | OUTPATIENT
Start: 2024-02-12 | End: 2024-02-17 | Stop reason: HOSPADM

## 2024-02-12 RX ORDER — HEPARIN SODIUM 1000 [USP'U]/ML
80 INJECTION, SOLUTION INTRAVENOUS; SUBCUTANEOUS PRN
Status: DISCONTINUED | OUTPATIENT
Start: 2024-02-12 | End: 2024-02-12 | Stop reason: SDUPTHER

## 2024-02-12 RX ORDER — METOLAZONE 2.5 MG/1
2.5 TABLET ORAL DAILY
Status: DISCONTINUED | OUTPATIENT
Start: 2024-02-13 | End: 2024-02-13

## 2024-02-12 RX ORDER — MAGNESIUM SULFATE IN WATER 40 MG/ML
2000 INJECTION, SOLUTION INTRAVENOUS PRN
Status: DISCONTINUED | OUTPATIENT
Start: 2024-02-12 | End: 2024-02-17 | Stop reason: HOSPADM

## 2024-02-12 RX ORDER — ASPIRIN 81 MG/1
81 TABLET ORAL DAILY
Status: DISCONTINUED | OUTPATIENT
Start: 2024-02-13 | End: 2024-02-17 | Stop reason: HOSPADM

## 2024-02-12 RX ORDER — HEPARIN SODIUM 10000 [USP'U]/100ML
5-30 INJECTION, SOLUTION INTRAVENOUS CONTINUOUS
Status: DISCONTINUED | OUTPATIENT
Start: 2024-02-12 | End: 2024-02-12 | Stop reason: SDUPTHER

## 2024-02-12 RX ORDER — ESCITALOPRAM OXALATE 10 MG/1
10 TABLET ORAL DAILY
COMMUNITY

## 2024-02-12 RX ORDER — HEPARIN SODIUM 1000 [USP'U]/ML
80 INJECTION, SOLUTION INTRAVENOUS; SUBCUTANEOUS ONCE
Status: COMPLETED | OUTPATIENT
Start: 2024-02-12 | End: 2024-02-12

## 2024-02-12 RX ORDER — POTASSIUM CHLORIDE 20 MEQ/1
40 TABLET, EXTENDED RELEASE ORAL PRN
Status: DISCONTINUED | OUTPATIENT
Start: 2024-02-12 | End: 2024-02-17 | Stop reason: HOSPADM

## 2024-02-12 RX ORDER — SODIUM CHLORIDE 0.9 % (FLUSH) 0.9 %
5-40 SYRINGE (ML) INJECTION EVERY 12 HOURS SCHEDULED
Status: DISCONTINUED | OUTPATIENT
Start: 2024-02-12 | End: 2024-02-17 | Stop reason: HOSPADM

## 2024-02-12 RX ORDER — POLYETHYLENE GLYCOL 3350 17 G/17G
17 POWDER, FOR SOLUTION ORAL DAILY PRN
Status: DISCONTINUED | OUTPATIENT
Start: 2024-02-12 | End: 2024-02-17 | Stop reason: HOSPADM

## 2024-02-12 RX ORDER — HEPARIN SODIUM 1000 [USP'U]/ML
80 INJECTION, SOLUTION INTRAVENOUS; SUBCUTANEOUS PRN
Status: DISCONTINUED | OUTPATIENT
Start: 2024-02-12 | End: 2024-02-13

## 2024-02-12 RX ORDER — ACETAMINOPHEN 650 MG/1
650 SUPPOSITORY RECTAL EVERY 6 HOURS PRN
Status: DISCONTINUED | OUTPATIENT
Start: 2024-02-12 | End: 2024-02-17 | Stop reason: HOSPADM

## 2024-02-12 RX ORDER — ERGOCALCIFEROL 1.25 MG/1
50000 CAPSULE ORAL WEEKLY
COMMUNITY

## 2024-02-12 RX ORDER — POLYETHYLENE GLYCOL 3350 17 G/17G
17 POWDER, FOR SOLUTION ORAL PRN
COMMUNITY

## 2024-02-12 RX ORDER — SODIUM CHLORIDE 9 MG/ML
INJECTION, SOLUTION INTRAVENOUS PRN
Status: DISCONTINUED | OUTPATIENT
Start: 2024-02-12 | End: 2024-02-17 | Stop reason: HOSPADM

## 2024-02-12 RX ADMIN — IOPAMIDOL 75 ML: 755 INJECTION, SOLUTION INTRAVENOUS at 14:58

## 2024-02-12 RX ADMIN — FLUTICASONE PROPIONATE 1 SPRAY: 50 SPRAY, METERED NASAL at 22:32

## 2024-02-12 RX ADMIN — SODIUM CHLORIDE: 9 INJECTION, SOLUTION INTRAVENOUS at 22:31

## 2024-02-12 RX ADMIN — HEPARIN SODIUM 8340 UNITS: 1000 INJECTION INTRAVENOUS; SUBCUTANEOUS at 19:01

## 2024-02-12 RX ADMIN — HEPARIN SODIUM 18 UNITS/KG/HR: 10000 INJECTION, SOLUTION INTRAVENOUS at 19:03

## 2024-02-12 RX ADMIN — Medication 2 PUFF: at 21:11

## 2024-02-12 ASSESSMENT — LIFESTYLE VARIABLES
HOW OFTEN DO YOU HAVE A DRINK CONTAINING ALCOHOL: MONTHLY OR LESS
HOW MANY STANDARD DRINKS CONTAINING ALCOHOL DO YOU HAVE ON A TYPICAL DAY: 1 OR 2
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
## 2024-02-12 NOTE — ED PROVIDER NOTES
3 vessel aortic arch is present.  Redemonstration ulcerative plaque along the lateral margin of the aortic arch measuring approximately 11 mm in depth.  Additional ulcerative plaque along the left lateral margin of the proximal descending thoracic aorta measures 8 mm in depth.  No evidence of aortic dissection.  The thoracic aortic caliber is within normal limits. The main pulmonary artery is enlarged in caliber measuring 3.3 cm.  Tumor associated thrombus is noted in the left main pulmonary artery (series 2, image 51).  Tumor thrombus extending into the pulmonary arteries supplying the left lower lobe are almost occlusive in nature. Mediastinum: Paratracheal lymph nodes are within normal limits by size criteria.  No right hilar lymphadenopathy.  Evaluation the left hilum is limited secondary to the presence of a infiltrative mass. Central airways: Central airways of the apical segment of the left lower lobe are occluded by the presence of the infiltrative mass.  Bronchiectasis noted along the central airways is of the remainder of the left lower lobe. Lungs: Infiltrative spiculated mass arising from the apical segment of the left lower lobe, invading the left hilum, including the left pulmonary artery with subsequent tumor thrombus.  This mass measures approximately 3.5 x 4.6 x 3.4 cm.  Scattered nodularity along the remainder of the left lower lobe suggests left lower lobe pulmonary metastases.  Apical predominant emphysema is noted.  The right lung is clear of consolidation or suspicious nodularity. Pleura: No pleural effusion or pneumothorax. Upper Abdomen: Bilateral adrenal nodularity, left greater than right is present with the left adrenal gland nodule measuring up to 3.9 cm. Infiltrative soft tissue lesion along the hilum of the left kidney is present measuring approximate 3.3 x 2.0 cm, surrounding the left renal artery. Soft Tissues/Bones: Bilateral gynecomastia is present. Multilevel degenerative disc  abnormal transaminases, or any other pathology that might be causing the patient's symptoms.      ED Course as of 02/12/24 1831   Mon Feb 12, 2024   1559 Laboratory workup consistent with previous patient found to have mild hyperkalemia with see EKG.  This is relatively unchanged from previous.  CT scan of the neck and chest shows lung malignancy with likely adjacent pulmonary embolus and metastasis concerning for new cancer will consult oncology as well as vascular surgery and plan on admitting the patient [SC]   1831 Will start patient on heparin.  Vascular surgery and oncology both consulted.  Patient admitted to medicine. [SC]      ED Course User Index  [SC] Manfred Patterson MD       Patient was given the following medications:  Medications   heparin (porcine) injection 8,340 Units (has no administration in time range)   heparin (porcine) injection 8,340 Units (has no administration in time range)   heparin (porcine) injection 4,170 Units (has no administration in time range)   heparin 25,000 units in dextrose 5% 250 mL (premix) infusion (has no administration in time range)   iopamidol (ISOVUE-370) 76 % injection 75 mL (75 mLs IntraVENous Given 2/12/24 1212)       Disposition Considerations (tests considered but not done, Shared Decision Making, Pt Expectation of Test or Tx.): Shared decision making used for admission.       I am the Primary Clinician of Record.    I PERSONALLY SAW THE PATIENT AND PERFORMED A SUBSTANTIVE PORTION OF THE VISIT INCLUDING ALL ASPECTS OF THE MEDICAL DECISION MAKING PROCESS.    FINAL IMPRESSION      1. Mass of lower lobe of left lung    2. Acute pulmonary embolism without acute cor pulmonale, unspecified pulmonary embolism type (HCC)    3. Esophageal dysphagia          DISPOSITION/PLAN     DISPOSITION Decision To Admit 02/12/2024 05:00:58 PM      PATIENT REFERRED TO:  No follow-up provider specified.    DISCHARGE MEDICATIONS:  New Prescriptions    No medications on file

## 2024-02-12 NOTE — ACP (ADVANCE CARE PLANNING)
Advanced Care Planning Note.    Purpose of Encounter: Advanced care planning in light of acute PE  Parties In Attendance: Patient  Decisional Capacity: Yes  Subjective: Patient with dysphagia  Objective: Cr 1.4  Goals of Care Determination: Patient wants limited support (No CPR, short vent, consider surgery and HD, no trach or PEG)  Plan:  Hep gtt,  Echo, BL LE Doppler US, Onc/Pulm/GI/Renal/Urology consults, PT/OT  Code Status: DNR CCA  Time spent on Advanced care Plannin minutes  Advanced Care Planning Documents: Completed advanced directives on chart, sister is the POA.    Jose Bates MD  2024 6:34 PM

## 2024-02-12 NOTE — PROGRESS NOTES
Pharmacy Home Medication Reconciliation Note    A medication reconciliation has been completed for Gary Waddell 1945    Pharmacy: [facility] [address] [place], [place]    Information provided by: Facility    The patient's home medication list is as follows:  No current facility-administered medications on file prior to encounter.     Current Outpatient Medications on File Prior to Encounter   Medication Sig Dispense Refill    LACTOBACILLUS PO Take 1 tablet by mouth daily      amoxicillin-clavulanate (AUGMENTIN) 875-125 MG per tablet Take 1 tablet by mouth 2 times daily      umeclidinium bromide (INCRUSE ELLIPTA) 62.5 MCG/ACT inhaler Inhale 1 puff into the lungs daily      ferrous sulfate (IRON 325) 325 (65 Fe) MG tablet Take 1 tablet by mouth daily (with breakfast)      escitalopram (LEXAPRO) 10 MG tablet Take 1 tablet by mouth daily      polyethylene glycol (MIRALAX) 17 g PACK packet Take 17 g by mouth as needed (constipation)      methocarbamol (ROBAXIN) 750 MG tablet Take 1 tablet by mouth 3 times daily as needed      Dextromethorphan-guaiFENesin  MG/5ML SYRP Take 5 mLs by mouth every 6 hours as needed for Cough      vitamin D (ERGOCALCIFEROL) 1.25 MG (47248 UT) CAPS capsule Take 1 capsule by mouth once a week      ondansetron (ZOFRAN) 4 MG tablet Take 1 tablet by mouth every 8 hours as needed for Nausea or Vomiting      cetirizine (ZYRTEC) 5 MG tablet Take 5 mg by mouth daily (Patient not taking: Reported on 2/12/2024)      Tamsulosin HCl (FLOMAX PO) Take 0.4 mg by mouth      fluticasone (FLONASE) 50 MCG/ACT nasal spray 1 spray by Each Nostril route 2 times daily      guaiFENesin (MUCINEX) 600 MG extended release tablet Take 1 tablet by mouth 2 times daily      furosemide (LASIX) 20 MG tablet Take 20 mg by mouth daily (Patient not taking: Reported on 2/12/2024)      lisinopril (PRINIVIL;ZESTRIL) 20 MG tablet Take 20 mg by mouth daily (Patient not taking: Reported on

## 2024-02-12 NOTE — ED NOTES
ED TO INPATIENT SBAR HANDOFF    Patient Name: Gary Waddell   :  1945  78 y.o.   MRN:  [id number]  Preferred Name - Orestes  ED Room #:  ED-0022/22  Family/Caregiver Present no   Restraints no   Sitter no   Sepsis Risk Score Sepsis Risk Score: 3.21    Situation  Code Status: Prior No additional code details.    Allergies: Patient has no known allergies.  Weight: Patient Vitals for the past 96 hrs (Last 3 readings):   Weight   24 1316 104.3 kg (230 lb)     Arrived from: [facility]  Chief Complaint:   Chief Complaint   Patient presents with    Dysphagia     Pt in via [facility] from [facility]. Per facility, pt has been complaining of a choking sensation and that he feels like \"his pills are getting stuck in his throat.\" Pt A/Ox4 in triage.      Hospital Problem/Diagnosis:  Principal Problem:    Acute pulmonary embolism without acute cor pulmonale (HCC)  Active Problems:    Mass of left lung    Dysphagia    Left kidney mass    HTN (hypertension)    CKD (chronic kidney disease) stage 3, GFR 30-59 ml/min (HCC)    Acute pulmonary embolism, unspecified pulmonary embolism type, unspecified whether acute cor pulmonale present (HCC)  Resolved Problems:    * No resolved hospital problems. *    Imaging:   CT CHEST W CONTRAST   Final Result   Lung mass measuring to 4.6 cm arising from the apical segment of the left   lower lobe, infiltrating into the left hilum with subsequent tumor thrombus   in the left main pulmonary artery and compromise of pulmonary artery flow to   the left lower lobe, described in detail above.      Scattered nodularity in the remainder of the left lower lobe concerning for   metastases versus perfusional sequelae from pulmonary artery invasion.      Infiltrative mass arising along the left renal hilum, concerning for   metastatic disease versus synchronous neoplastic process.      Significant atherosclerotic disease of the thoracic aorta with ulcerated   plaques detailed above.     provide details: heparin   Pending Blood Product Administration: no       You may also review the ED PT Care Timeline found under the Summary Nursing Index tab.    Recommendation    Pending orders - All ED orders complete   Plan for Discharge (if known):   Additional Comments: lung mass concern for malignancy    If any further questions, please call Sending RN at [phone]    Electronically signed by: Electronically signed by Stephanie Garcia RN on 2/12/2024 at 6:56 PM

## 2024-02-13 ENCOUNTER — APPOINTMENT (OUTPATIENT)
Dept: CT IMAGING | Age: 79
End: 2024-02-13
Payer: COMMERCIAL

## 2024-02-13 PROBLEM — J43.2 CENTRILOBULAR EMPHYSEMA (HCC): Status: ACTIVE | Noted: 2024-02-13

## 2024-02-13 PROBLEM — R91.8 MASS OF LOWER LOBE OF LEFT LUNG: Status: ACTIVE | Noted: 2024-02-13

## 2024-02-13 PROBLEM — F17.210 CIGARETTE NICOTINE DEPENDENCE WITHOUT COMPLICATION: Status: ACTIVE | Noted: 2024-02-13

## 2024-02-13 LAB
AMMONIA PLAS-SCNC: <10 UMOL/L (ref 16–60)
ANION GAP SERPL CALCULATED.3IONS-SCNC: 19 MMOL/L (ref 3–16)
ANTI-XA UNFRAC HEPARIN: 0.18 IU/ML (ref 0.3–0.7)
ANTI-XA UNFRAC HEPARIN: >1.1 IU/ML (ref 0.3–0.7)
BASOPHILS # BLD: 0.1 K/UL (ref 0–0.2)
BASOPHILS NFR BLD: 1.1 %
BUN SERPL-MCNC: 39 MG/DL (ref 7–20)
CALCIUM SERPL-MCNC: 10.6 MG/DL (ref 8.3–10.6)
CHLORIDE SERPL-SCNC: 98 MMOL/L (ref 99–110)
CO2 SERPL-SCNC: 20 MMOL/L (ref 21–32)
CREAT SERPL-MCNC: 1.3 MG/DL (ref 0.8–1.3)
DEPRECATED RDW RBC AUTO: 14.7 % (ref 12.4–15.4)
EOSINOPHIL # BLD: 0.2 K/UL (ref 0–0.6)
EOSINOPHIL NFR BLD: 2.8 %
GFR SERPLBLD CREATININE-BSD FMLA CKD-EPI: 56 ML/MIN/{1.73_M2}
GLUCOSE SERPL-MCNC: 81 MG/DL (ref 70–99)
HCT VFR BLD AUTO: 40.2 % (ref 40.5–52.5)
HGB BLD-MCNC: 13.2 G/DL (ref 13.5–17.5)
LYMPHOCYTES # BLD: 2.6 K/UL (ref 1–5.1)
LYMPHOCYTES NFR BLD: 33.1 %
MCH RBC QN AUTO: 30.3 PG (ref 26–34)
MCHC RBC AUTO-ENTMCNC: 32.9 G/DL (ref 31–36)
MCV RBC AUTO: 92.1 FL (ref 80–100)
MONOCYTES # BLD: 0.9 K/UL (ref 0–1.3)
MONOCYTES NFR BLD: 11.3 %
NEUTROPHILS # BLD: 4.1 K/UL (ref 1.7–7.7)
NEUTROPHILS NFR BLD: 51.7 %
PLATELET # BLD AUTO: 382 K/UL (ref 135–450)
PMV BLD AUTO: 8.9 FL (ref 5–10.5)
POTASSIUM SERPL-SCNC: 5.2 MMOL/L (ref 3.5–5.1)
RBC # BLD AUTO: 4.36 M/UL (ref 4.2–5.9)
REPORT: NORMAL
RESP PATH DNA+RNA PNL NPH NAA+NON-PROBE: NORMAL
SODIUM SERPL-SCNC: 137 MMOL/L (ref 136–145)
WBC # BLD AUTO: 7.9 K/UL (ref 4–11)

## 2024-02-13 PROCEDURE — 85025 COMPLETE CBC W/AUTO DIFF WBC: CPT

## 2024-02-13 PROCEDURE — 99223 1ST HOSP IP/OBS HIGH 75: CPT | Performed by: INTERNAL MEDICINE

## 2024-02-13 PROCEDURE — 6360000002 HC RX W HCPCS: Performed by: EMERGENCY MEDICINE

## 2024-02-13 PROCEDURE — 2580000003 HC RX 258: Performed by: INTERNAL MEDICINE

## 2024-02-13 PROCEDURE — 92610 EVALUATE SWALLOWING FUNCTION: CPT

## 2024-02-13 PROCEDURE — 1200000000 HC SEMI PRIVATE

## 2024-02-13 PROCEDURE — 74177 CT ABD & PELVIS W/CONTRAST: CPT

## 2024-02-13 PROCEDURE — 6360000004 HC RX CONTRAST MEDICATION: Performed by: STUDENT IN AN ORGANIZED HEALTH CARE EDUCATION/TRAINING PROGRAM

## 2024-02-13 PROCEDURE — 92526 ORAL FUNCTION THERAPY: CPT

## 2024-02-13 PROCEDURE — 36415 COLL VENOUS BLD VENIPUNCTURE: CPT

## 2024-02-13 PROCEDURE — 99223 1ST HOSP IP/OBS HIGH 75: CPT | Performed by: SURGERY

## 2024-02-13 PROCEDURE — 82140 ASSAY OF AMMONIA: CPT

## 2024-02-13 PROCEDURE — 0202U NFCT DS 22 TRGT SARS-COV-2: CPT

## 2024-02-13 PROCEDURE — 6370000000 HC RX 637 (ALT 250 FOR IP): Performed by: INTERNAL MEDICINE

## 2024-02-13 PROCEDURE — 80048 BASIC METABOLIC PNL TOTAL CA: CPT

## 2024-02-13 PROCEDURE — 93970 EXTREMITY STUDY: CPT

## 2024-02-13 PROCEDURE — 85520 HEPARIN ASSAY: CPT

## 2024-02-13 PROCEDURE — 70450 CT HEAD/BRAIN W/O DYE: CPT

## 2024-02-13 RX ADMIN — SODIUM CHLORIDE, PRESERVATIVE FREE 10 ML: 5 INJECTION INTRAVENOUS at 19:53

## 2024-02-13 RX ADMIN — Medication 2 PUFF: at 08:07

## 2024-02-13 RX ADMIN — ASPIRIN 81 MG: 81 TABLET, COATED ORAL at 11:56

## 2024-02-13 RX ADMIN — FLUTICASONE PROPIONATE 1 SPRAY: 50 SPRAY, METERED NASAL at 19:52

## 2024-02-13 RX ADMIN — TAMSULOSIN HYDROCHLORIDE 0.4 MG: 0.4 CAPSULE ORAL at 11:56

## 2024-02-13 RX ADMIN — IOPAMIDOL 75 ML: 755 INJECTION, SOLUTION INTRAVENOUS at 21:41

## 2024-02-13 RX ADMIN — FLUTICASONE PROPIONATE 1 SPRAY: 50 SPRAY, METERED NASAL at 11:56

## 2024-02-13 RX ADMIN — FERROUS SULFATE TAB 325 MG (65 MG ELEMENTAL FE) 325 MG: 325 (65 FE) TAB at 11:57

## 2024-02-13 RX ADMIN — TIOTROPIUM BROMIDE INHALATION SPRAY 2 PUFF: 3.12 SPRAY, METERED RESPIRATORY (INHALATION) at 08:07

## 2024-02-13 RX ADMIN — SODIUM CHLORIDE, PRESERVATIVE FREE 10 ML: 5 INJECTION INTRAVENOUS at 11:55

## 2024-02-13 RX ADMIN — ESCITALOPRAM OXALATE 10 MG: 10 TABLET ORAL at 11:57

## 2024-02-13 RX ADMIN — HEPARIN SODIUM 4170 UNITS: 1000 INJECTION INTRAVENOUS; SUBCUTANEOUS at 03:57
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
## 2024-02-13 NOTE — CARE COORDINATION
Per chart pt from LTC at Home at [facility].    CM called Mary with Home at [facility] [phone] and left VM to verify information.    Pt has multiple consults pending and therapy pending. On heparin gtt.     CM will follow for plan and discharge needs.     Tiera Toscano RN, BSN  [phone]         Updated at 8:22 AM    Mary with home at [facility] returned call and pt is LTC and can return with no pre cert needed first. States pt uses a wheelchair at facility.    She states can return even if Covid +.    Tiera Toscano RN, BSN  [phone]

## 2024-02-13 NOTE — PLAN OF CARE
Problem: Skin/Tissue Integrity  Goal: Absence of new skin breakdown  Description: 1.  Monitor for areas of redness and/or skin breakdown  2.  Assess vascular access sites hourly  3.  Every 4-6 hours minimum:  Change oxygen saturation probe site  4.  Every 4-6 hours:  If on nasal continuous positive airway pressure, respiratory therapy assess nares and determine need for appliance change or resting period.  2/13/2024 1146 by Lisa Pearl, RN  Outcome: Progressing  2/13/2024 0406 by Ryan Fowler, RN  Outcome: Progressing     Problem: Safety - Adult  Goal: Free from fall injury  2/13/2024 1146 by Lisa Pearl, RN  Outcome: Progressing  2/13/2024 0406 by Ryan Fowler, RN  Outcome: Progressing

## 2024-02-13 NOTE — PROGRESS NOTES
Patient has arrived to unit in stable condition. Vitals stable. Patient is awake, alert and oriented. Respirations are easy and unlabored. Patient does not appear to be in distress. Patient oriented to room and call light. Plan of care discussed with patient, patient agreeable. Call light within reach.

## 2024-02-13 NOTE — PROGRESS NOTES
Patient seen in ED, room 22.  Admission completed with the following exceptions:  4 Eyes Assessment, Immunizations, Vaccines, Rights and Responsibilities, Orientation to room, Plan of Care, Education/Learning Assessment and Education Plan, white board, height and weight, pain assessment and head to toe assessment.  Patient is alert and oriented X 4.  Patient lives At [facility].  He says he lives in Independent Living, but the nurses administer his medications and aids are available to assist with bathing and personal care like wrapping his feet/legs.  He  is being admitted for Acute pulmonary embolism without acute cor pulmonale.  Home Medication reconciliation was completed by Sonny Moran.  All questions answered.    Patient states that he has a Living Will and Power of , but we do not have a copy on file.    C-diff order set entered due to patient report of loose stool within the last two weeks.    All questions answered by patient.

## 2024-02-13 NOTE — PROGRESS NOTES
Facility/Department: [address] ONCOLOGY  Speech Language Pathology  DYSPHAGIA BEDSIDE SWALLOW EVALUATION     Patient: Gary Waddell   : 1945   MRN: [id number]      Evaluation Date: 2024   Admitting Diagnosis: Esophageal dysphagia [R13.19]  Mass of lower lobe of left lung [R91.8]  Acute pulmonary embolism without acute cor pulmonale, unspecified pulmonary embolism type (HCC) [I26.99]  Acute pulmonary embolism, unspecified pulmonary embolism type, unspecified whether acute cor pulmonale present (HCC) [I26.99]  Pain: Denies                                                       H&P:  Gary Waddell is a 78 y.o. male with history of tobacco abuse, CKD 3, HTN came to ER from [facility] with complaints of dysphagia.  Patient states things are getting stuck in his throat.  He is coughing when eating.  No SOB, HA, abdominal pain, melena, hematochezia, fevers, chills or NS.  No hemoptysis.  No pleurisy.  Denies pain in legs.  Found to have large lung mass with PE in ED.  Otherwise complete ROS is negative unless listed above.       Imaging:  CT Chest:   IMPRESSION:  Lung mass measuring to 4.6 cm arising from the apical segment of the left  lower lobe, infiltrating into the left hilum with subsequent tumor thrombus  in the left main pulmonary artery and compromise of pulmonary artery flow to  the left lower lobe, described in detail above.     Scattered nodularity in the remainder of the left lower lobe concerning for  metastases versus perfusional sequelae from pulmonary artery invasion.     Infiltrative mass arising along the left renal hilum, concerning for  metastatic disease versus synchronous neoplastic process.     Significant atherosclerotic disease of the thoracic aorta with ulcerated  plaques detailed above.          History/Prior Level of Function:   Living Status: ECF  Prior Dysphagia History: No prior SLP notes located in chart review. Pt largely denied any previous hx of dysphagia  assessment, recommendations and general speech pathology plan of care.   [x] Pt verbalized understanding and agreement   [] Pt requires ongoing learning   [] No evidence of comprehension     If patient discharges prior to next visit, this note will serve as discharge.     Treatment time:  Timed Code Treatment Minutes: 0 minutes   Total Treatment time: 25 minutes     Electronically signed by:    Tequila Benitez MA CCC-SLP #[id number]  Speech Language Pathologist

## 2024-02-13 NOTE — CARE COORDINATION
Case Management Assessment  Initial Evaluation    Date/Time of Evaluation: 2/13/2024 3:40 PM  Assessment Completed by: BISHNU HUDSON RN    If patient is discharged prior to next notation, then this note serves as note for discharge by case management.    Patient Name: Gary Waddell                   YOB: 1945  Diagnosis: Esophageal dysphagia [R13.19]  Mass of lower lobe of left lung [R91.8]  Acute pulmonary embolism without acute cor pulmonale, unspecified pulmonary embolism type (HCC) [I26.99]  Acute pulmonary embolism, unspecified pulmonary embolism type, unspecified whether acute cor pulmonale present (HCC) [I26.99]                   Date / Time: 2/12/2024  1:03 PM    Patient Admission Status: Inpatient   Readmission Risk (Low < 19, Mod (19-27), High > 27): Readmission Risk Score: 13.1    Current PCP: Juan J Saravia  PCP verified by CM? Yes    Chart Reviewed: Yes      History Provided by: Patient, Other (see comment) (Home at [facility])  Patient Orientation: Alert and Oriented, Person, Place, Situation    Patient Cognition: Alert    Hospitalization in the last 30 days (Readmission):  No    If yes, Readmission Assessment in  Navigator will be completed.    Advance Directives:      Code Status: DNR-CCA   Patient's Primary Decision Maker is: Legal Next of Kin      Discharge Planning:    Patient lives with: Other (Comment) (LTC facility) Type of Home: Long-Term Care  Primary Care Giver: Other (Comment) (LTC staff)  Patient Support Systems include: Emergency Call System, Other (Comment) (LTC staff)   Current Financial resources: Medicare, Medicaid  Current community resources: Other (Comment) (LTC facility)  Current services prior to admission: Emergency Call  System, Durable Medical Equipment            Current DME: Wheelchair            Type of Home Care services:  None    ADLS  Prior functional level: Assistance with the following:, Mobility, Shopping, Housework, Cooking, Other (see comment)

## 2024-02-13 NOTE — CONSULTS
PULMONARY AND CRITICAL CARE MEDICINE CONSULTATION NOTE    CONSULTING PHYSICIAN:  Jose Bates MD    ADMISSION DATE: 2/12/2024  ADMISSION DIAGNOSIS: Esophageal dysphagia [R13.19]  Mass of lower lobe of left lung [R91.8]  Acute pulmonary embolism without acute cor pulmonale, unspecified pulmonary embolism type (HCC) [I26.99]  Acute pulmonary embolism, unspecified pulmonary embolism type, unspecified whether acute cor pulmonale present (HCC) [I26.99]    REASON FOR CONSULT:   Chief Complaint   Patient presents with    Dysphagia     Pt in via [facility] from [facility]. Per facility, pt has been complaining of a choking sensation and that he feels like \"his pills are getting stuck in his throat.\" Pt A/Ox4 in triage.        DATE OF CONSULT: 2/12/2024    HISTORY OF PRESENT ILLNESS: 78 y.o. year old male with a past medical history significant for CKD stage III, COPD, hypertension, PVD presented to the hospital with dysphagia.  Patient lives at [facility] long-term [facility] and was reporting that his food was getting stuck for the last 2 to 3 days.  He denied any coughing, shortness of breath, wheezing, discolored expectoration, hemoptysis, fevers or night sweats.  No nausea or vomiting.  No abdominal distention.  Has been losing weight for last few months.  Reports that he has been trying to take Lasix which is helping him lose weight.  Currently smokes about 5 to 6 cigarettes every day.  Has been smoking for the last 30 to 40 years.  In the emergency room a CT angiogram done showed a large left lung mass which is infiltrating the hilum and also encroaching into the pulmonary artery branch.  Pulmonary has been consulted to evaluate this mass.    At the time of my evaluation patient is lying in bed not in any respiratory distress.  Currently on room air saturating in mid 90s.  Denies any respiratory symptoms.  Most concerned about his episodes of food getting stuck.  He has not had difficulty with liquids.      No Known Problems Sister     Cancer Brother     Colon Cancer Brother     Diabetes Maternal Grandmother        MEDICATIONS:     No current facility-administered medications on file prior to encounter.     Current Outpatient Medications on File Prior to Encounter   Medication Sig Dispense Refill    LACTOBACILLUS PO Take 1 tablet by mouth daily      amoxicillin-clavulanate (AUGMENTIN) 875-125 MG per tablet Take 1 tablet by mouth 2 times daily      umeclidinium bromide (INCRUSE ELLIPTA) 62.5 MCG/ACT inhaler Inhale 1 puff into the lungs daily      ferrous sulfate (IRON 325) 325 (65 Fe) MG tablet Take 1 tablet by mouth daily (with breakfast)      escitalopram (LEXAPRO) 10 MG tablet Take 1 tablet by mouth daily      polyethylene glycol (MIRALAX) 17 g PACK packet Take 17 g by mouth as needed (constipation)      methocarbamol (ROBAXIN) 750 MG tablet Take 1 tablet by mouth 3 times daily as needed      Dextromethorphan-guaiFENesin  MG/5ML SYRP Take 5 mLs by mouth every 6 hours as needed for Cough      vitamin D (ERGOCALCIFEROL) 1.25 MG (84967 UT) CAPS capsule Take 1 capsule by mouth once a week      ondansetron (ZOFRAN) 4 MG tablet Take 1 tablet by mouth every 8 hours as needed for Nausea or Vomiting      cetirizine (ZYRTEC) 5 MG tablet Take 5 mg by mouth daily (Patient not taking: Reported on 2/12/2024)      Tamsulosin HCl (FLOMAX PO) Take 0.4 mg by mouth      fluticasone (FLONASE) 50 MCG/ACT nasal spray 1 spray by Each Nostril route 2 times daily      guaiFENesin (MUCINEX) 600 MG extended release tablet Take 1 tablet by mouth 2 times daily      furosemide (LASIX) 20 MG tablet Take 20 mg by mouth daily (Patient not taking: Reported on 2/12/2024)      lisinopril (PRINIVIL;ZESTRIL) 20 MG tablet Take 20 mg by mouth daily (Patient not taking: Reported on 2/12/2024)      metOLazone (ZAROXOLYN) 2.5 MG tablet Take 1 tablet by mouth daily      tiotropium (SPIRIVA) 18 MCG inhalation capsule Inhale 18 mcg into the lungs daily

## 2024-02-13 NOTE — PROGRESS NOTES
4 Eyes Skin Assessment     NAME:  Gary Waddell  YOB: 1945  MEDICAL RECORD NUMBER:  [id number]    The patient is being assessed for  Admission    I agree that at least one RN has performed a thorough Head to Toe Skin Assessment on the patient. ALL assessment sites listed below have been assessed.      Areas assessed by both nurses:    Head, Face, Ears, Shoulders, Back, Chest, Arms, Elbows, Hands, Sacrum. Buttock, Coccyx, Ischium, and Legs. Feet and Heels        Does the Patient have a Wound? No noted wound(s)       Ramiro Prevention initiated by RN: No  Wound Care Orders initiated by RN: No    Pressure Injury (Stage 3,4, Unstageable, DTI, NWPT, and Complex wounds) if present, place Wound referral order by RN under : No    New Ostomies, if present place, Ostomy referral order under : No     Nurse 1 eSignature: Electronically signed by Ryan Fowler RN on 2/12/24 at 8:31 PM EST    **SHARE this note so that the co-signing nurse can place an eSignature**    Nurse 2 eSignature: Electronically signed by Esther Kruse RN on 2/13/24 at 3:50 AM EST

## 2024-02-13 NOTE — CONSULTS
Dispense Refill    LACTOBACILLUS PO Take 1 tablet by mouth daily      amoxicillin-clavulanate (AUGMENTIN) 875-125 MG per tablet Take 1 tablet by mouth 2 times daily      umeclidinium bromide (INCRUSE ELLIPTA) 62.5 MCG/ACT inhaler Inhale 1 puff into the lungs daily      ferrous sulfate (IRON 325) 325 (65 Fe) MG tablet Take 1 tablet by mouth daily (with breakfast)      escitalopram (LEXAPRO) 10 MG tablet Take 1 tablet by mouth daily      polyethylene glycol (MIRALAX) 17 g PACK packet Take 17 g by mouth as needed (constipation)      methocarbamol (ROBAXIN) 750 MG tablet Take 1 tablet by mouth 3 times daily as needed      Dextromethorphan-guaiFENesin  MG/5ML SYRP Take 5 mLs by mouth every 6 hours as needed for Cough      vitamin D (ERGOCALCIFEROL) 1.25 MG (21419 UT) CAPS capsule Take 1 capsule by mouth once a week      ondansetron (ZOFRAN) 4 MG tablet Take 1 tablet by mouth every 8 hours as needed for Nausea or Vomiting      cetirizine (ZYRTEC) 5 MG tablet Take 5 mg by mouth daily (Patient not taking: Reported on 2/12/2024)      Tamsulosin HCl (FLOMAX PO) Take 0.4 mg by mouth      fluticasone (FLONASE) 50 MCG/ACT nasal spray 1 spray by Each Nostril route 2 times daily      guaiFENesin (MUCINEX) 600 MG extended release tablet Take 1 tablet by mouth 2 times daily      furosemide (LASIX) 20 MG tablet Take 20 mg by mouth daily (Patient not taking: Reported on 2/12/2024)      lisinopril (PRINIVIL;ZESTRIL) 20 MG tablet Take 20 mg by mouth daily (Patient not taking: Reported on 2/12/2024)      metOLazone (ZAROXOLYN) 2.5 MG tablet Take 1 tablet by mouth daily      tiotropium (SPIRIVA) 18 MCG inhalation capsule Inhale 18 mcg into the lungs daily (Patient not taking: Reported on 2/12/2024)      budesonide-formoterol (SYMBICORT) 160-4.5 MCG/ACT AERO Inhale 1 puff into the lungs 2 times daily      linezolid (ZYVOX) 600 MG tablet Take 600 mg by mouth 2 times daily (Patient not taking: Reported on 2/12/2024)      naproxen  sodium (ANAPROX) 220 MG tablet Take 1 tablet by mouth      lisinopril-hydrochlorothiazide (PRINZIDE;ZESTORETIC) 20-12.5 MG per tablet  (Patient not taking: Reported on 8/22/2022)      aspirin 81 MG EC tablet Take 1 tablet by mouth daily      Omega-3 Fatty Acids (FISH OIL) 1000 MG CAPS Take 1,000 mg by mouth daily (Patient not taking: Reported on 2/12/2024)      metoprolol (LOPRESSOR) 12.5 mg TABS Take 25 mg by mouth 2 times daily  (Patient not taking: Reported on 8/22/2022)      acetaminophen (TYLENOL 8 HOUR) 650 MG CR tablet Take 650 mg by mouth 2 times daily. (Patient not taking: Reported on 2/12/2024)          Allergies  No Known Allergies   Social   Social History     Tobacco Use    Smoking status: Every Day     Current packs/day: 0.25     Types: Cigarettes    Smokeless tobacco: Never    Tobacco comments:     Cutting down to just a couple a day.   Substance Use Topics    Alcohol use: Not Currently        Family History   Problem Relation Age of Onset    Cancer Father     No Known Problems Sister     Cancer Brother     Colon Cancer Brother     Diabetes Maternal Grandmother                Physical Exam  Blood pressure 117/85, pulse 89, temperature 98.6 °F (37 °C), temperature source Oral, resp. rate 19, height 1.778 m (5' 10\"), weight 97.5 kg (214 lb 15.2 oz), SpO2 94 %.    General appearance: alert, cooperative, no distress, appears stated age  Eyes: Anicteric  Head: Normocephalic, without obvious abnormality  Lungs: clear to auscultation bilaterally, Normal Effort  Heart: regular rate and rhythm, normal S1 and S2, no murmurs or rubs  Abdomen: soft, non-tender. Bowel sounds normal. No masses,  no organomegaly.   Extremities: atraumatic, no cyanosis,. BLEedema  Skin: warm and dry, no jaundice  Neuro: Grossly intact, A&OX3  Musculoskeletal: 5/5  strength BUE      Data Review:    Recent Labs     02/12/24  1900 02/12/24 2127 02/13/24  0527   WBC 7.6 8.2 7.9   HGB 12.4* 13.1* 13.2*   HCT 36.8* 39.6* 40.2*   MCV
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
## 2024-02-14 ENCOUNTER — APPOINTMENT (OUTPATIENT)
Dept: NUCLEAR MEDICINE | Age: 79
End: 2024-02-14
Payer: COMMERCIAL

## 2024-02-14 ENCOUNTER — ANESTHESIA EVENT (OUTPATIENT)
Dept: ENDOSCOPY | Age: 79
End: 2024-02-14
Payer: COMMERCIAL

## 2024-02-14 ENCOUNTER — ANESTHESIA (OUTPATIENT)
Dept: ENDOSCOPY | Age: 79
End: 2024-02-14
Payer: COMMERCIAL

## 2024-02-14 LAB
ANION GAP SERPL CALCULATED.3IONS-SCNC: 20 MMOL/L (ref 3–16)
BASOPHILS # BLD: 0.1 K/UL (ref 0–0.2)
BASOPHILS NFR BLD: 0.9 %
BUN SERPL-MCNC: 32 MG/DL (ref 7–20)
CALCIUM SERPL-MCNC: 10.5 MG/DL (ref 8.3–10.6)
CHLORIDE SERPL-SCNC: 98 MMOL/L (ref 99–110)
CO2 SERPL-SCNC: 18 MMOL/L (ref 21–32)
CREAT SERPL-MCNC: 1.2 MG/DL (ref 0.8–1.3)
DEPRECATED RDW RBC AUTO: 14.2 % (ref 12.4–15.4)
EOSINOPHIL # BLD: 0.2 K/UL (ref 0–0.6)
EOSINOPHIL NFR BLD: 2.7 %
GFR SERPLBLD CREATININE-BSD FMLA CKD-EPI: >60 ML/MIN/{1.73_M2}
GLUCOSE BLD-MCNC: 79 MG/DL (ref 70–99)
GLUCOSE BLD-MCNC: 86 MG/DL (ref 70–99)
GLUCOSE BLD-MCNC: 88 MG/DL (ref 70–99)
GLUCOSE SERPL-MCNC: 78 MG/DL (ref 70–99)
HCT VFR BLD AUTO: 39.6 % (ref 40.5–52.5)
HGB BLD-MCNC: 13 G/DL (ref 13.5–17.5)
LYMPHOCYTES # BLD: 2.1 K/UL (ref 1–5.1)
LYMPHOCYTES NFR BLD: 27.2 %
MCH RBC QN AUTO: 30.1 PG (ref 26–34)
MCHC RBC AUTO-ENTMCNC: 32.9 G/DL (ref 31–36)
MCV RBC AUTO: 91.6 FL (ref 80–100)
MONOCYTES # BLD: 0.9 K/UL (ref 0–1.3)
MONOCYTES NFR BLD: 12.1 %
NEUTROPHILS # BLD: 4.5 K/UL (ref 1.7–7.7)
NEUTROPHILS NFR BLD: 57.1 %
PERFORMED ON: NORMAL
PLATELET # BLD AUTO: 380 K/UL (ref 135–450)
PMV BLD AUTO: 9.2 FL (ref 5–10.5)
POTASSIUM SERPL-SCNC: 4.9 MMOL/L (ref 3.5–5.1)
RBC # BLD AUTO: 4.33 M/UL (ref 4.2–5.9)
SODIUM SERPL-SCNC: 136 MMOL/L (ref 136–145)
WBC # BLD AUTO: 7.8 K/UL (ref 4–11)

## 2024-02-14 PROCEDURE — 2709999900 HC NON-CHARGEABLE SUPPLY: Performed by: INTERNAL MEDICINE

## 2024-02-14 PROCEDURE — 1200000000 HC SEMI PRIVATE

## 2024-02-14 PROCEDURE — 7100000001 HC PACU RECOVERY - ADDTL 15 MIN: Performed by: INTERNAL MEDICINE

## 2024-02-14 PROCEDURE — 88342 IMHCHEM/IMCYTCHM 1ST ANTB: CPT

## 2024-02-14 PROCEDURE — 97535 SELF CARE MNGMENT TRAINING: CPT

## 2024-02-14 PROCEDURE — 3700000001 HC ADD 15 MINUTES (ANESTHESIA): Performed by: INTERNAL MEDICINE

## 2024-02-14 PROCEDURE — 2580000003 HC RX 258: Performed by: NURSE ANESTHETIST, CERTIFIED REGISTERED

## 2024-02-14 PROCEDURE — 97530 THERAPEUTIC ACTIVITIES: CPT

## 2024-02-14 PROCEDURE — 6360000002 HC RX W HCPCS: Performed by: NURSE ANESTHETIST, CERTIFIED REGISTERED

## 2024-02-14 PROCEDURE — A9503 TC99M MEDRONATE: HCPCS | Performed by: STUDENT IN AN ORGANIZED HEALTH CARE EDUCATION/TRAINING PROGRAM

## 2024-02-14 PROCEDURE — 88305 TISSUE EXAM BY PATHOLOGIST: CPT

## 2024-02-14 PROCEDURE — 3700000000 HC ANESTHESIA ATTENDED CARE: Performed by: INTERNAL MEDICINE

## 2024-02-14 PROCEDURE — 2580000003 HC RX 258: Performed by: INTERNAL MEDICINE

## 2024-02-14 PROCEDURE — 97166 OT EVAL MOD COMPLEX 45 MIN: CPT

## 2024-02-14 PROCEDURE — 94640 AIRWAY INHALATION TREATMENT: CPT

## 2024-02-14 PROCEDURE — 6370000000 HC RX 637 (ALT 250 FOR IP): Performed by: INTERNAL MEDICINE

## 2024-02-14 PROCEDURE — 7100000000 HC PACU RECOVERY - FIRST 15 MIN: Performed by: INTERNAL MEDICINE

## 2024-02-14 PROCEDURE — 85025 COMPLETE CBC W/AUTO DIFF WBC: CPT

## 2024-02-14 PROCEDURE — 3609015300 HC ESOPHAGEAL DILATION MALONEY: Performed by: INTERNAL MEDICINE

## 2024-02-14 PROCEDURE — 2500000003 HC RX 250 WO HCPCS: Performed by: NURSE ANESTHETIST, CERTIFIED REGISTERED

## 2024-02-14 PROCEDURE — 3609012400 HC EGD TRANSORAL BIOPSY SINGLE/MULTIPLE: Performed by: INTERNAL MEDICINE

## 2024-02-14 PROCEDURE — 99232 SBSQ HOSP IP/OBS MODERATE 35: CPT | Performed by: INTERNAL MEDICINE

## 2024-02-14 PROCEDURE — 3430000000 HC RX DIAGNOSTIC RADIOPHARMACEUTICAL: Performed by: STUDENT IN AN ORGANIZED HEALTH CARE EDUCATION/TRAINING PROGRAM

## 2024-02-14 PROCEDURE — 36415 COLL VENOUS BLD VENIPUNCTURE: CPT

## 2024-02-14 PROCEDURE — 78306 BONE IMAGING WHOLE BODY: CPT | Performed by: STUDENT IN AN ORGANIZED HEALTH CARE EDUCATION/TRAINING PROGRAM

## 2024-02-14 PROCEDURE — 0DB68ZX EXCISION OF STOMACH, VIA NATURAL OR ARTIFICIAL OPENING ENDOSCOPIC, DIAGNOSTIC: ICD-10-PCS | Performed by: INTERNAL MEDICINE

## 2024-02-14 PROCEDURE — 80048 BASIC METABOLIC PNL TOTAL CA: CPT

## 2024-02-14 PROCEDURE — 97162 PT EVAL MOD COMPLEX 30 MIN: CPT

## 2024-02-14 RX ORDER — SODIUM CHLORIDE 0.9 % (FLUSH) 0.9 %
5-40 SYRINGE (ML) INJECTION PRN
Status: DISCONTINUED | OUTPATIENT
Start: 2024-02-14 | End: 2024-02-17 | Stop reason: HOSPADM

## 2024-02-14 RX ORDER — SODIUM CHLORIDE 9 MG/ML
INJECTION, SOLUTION INTRAVENOUS CONTINUOUS PRN
Status: DISCONTINUED | OUTPATIENT
Start: 2024-02-14 | End: 2024-02-14 | Stop reason: SDUPTHER

## 2024-02-14 RX ORDER — PROPOFOL 10 MG/ML
INJECTION, EMULSION INTRAVENOUS PRN
Status: DISCONTINUED | OUTPATIENT
Start: 2024-02-14 | End: 2024-02-14 | Stop reason: SDUPTHER

## 2024-02-14 RX ORDER — PROPOFOL 10 MG/ML
INJECTION, EMULSION INTRAVENOUS CONTINUOUS PRN
Status: DISCONTINUED | OUTPATIENT
Start: 2024-02-14 | End: 2024-02-14 | Stop reason: SDUPTHER

## 2024-02-14 RX ORDER — TC 99M MEDRONATE 20 MG/10ML
23.04 INJECTION, POWDER, LYOPHILIZED, FOR SOLUTION INTRAVENOUS
Status: COMPLETED | OUTPATIENT
Start: 2024-02-14 | End: 2024-02-14

## 2024-02-14 RX ORDER — LIDOCAINE HYDROCHLORIDE 20 MG/ML
INJECTION, SOLUTION INFILTRATION; PERINEURAL PRN
Status: DISCONTINUED | OUTPATIENT
Start: 2024-02-14 | End: 2024-02-14 | Stop reason: SDUPTHER

## 2024-02-14 RX ORDER — PANTOPRAZOLE SODIUM 40 MG/1
40 TABLET, DELAYED RELEASE ORAL
Status: DISCONTINUED | OUTPATIENT
Start: 2024-02-14 | End: 2024-02-17 | Stop reason: HOSPADM

## 2024-02-14 RX ORDER — GLYCOPYRROLATE 0.2 MG/ML
INJECTION INTRAMUSCULAR; INTRAVENOUS PRN
Status: DISCONTINUED | OUTPATIENT
Start: 2024-02-14 | End: 2024-02-14 | Stop reason: SDUPTHER

## 2024-02-14 RX ADMIN — LIDOCAINE HYDROCHLORIDE 100 MG: 20 INJECTION, SOLUTION INFILTRATION; PERINEURAL at 15:03

## 2024-02-14 RX ADMIN — PROPOFOL 40 MG: 10 INJECTION, EMULSION INTRAVENOUS at 15:03

## 2024-02-14 RX ADMIN — PHENYLEPHRINE HYDROCHLORIDE 100 MCG: 10 INJECTION INTRAVENOUS at 15:08

## 2024-02-14 RX ADMIN — FLUTICASONE PROPIONATE 1 SPRAY: 50 SPRAY, METERED NASAL at 10:22

## 2024-02-14 RX ADMIN — TC 99M MEDRONATE 23.04 MILLICURIE: 20 INJECTION, POWDER, LYOPHILIZED, FOR SOLUTION INTRAVENOUS at 08:55

## 2024-02-14 RX ADMIN — GLYCOPYRROLATE 0.2 MG: 0.2 INJECTION, SOLUTION INTRAMUSCULAR; INTRAVENOUS at 15:01

## 2024-02-14 RX ADMIN — Medication 2 PUFF: at 00:06

## 2024-02-14 RX ADMIN — PROPOFOL 40 MG: 10 INJECTION, EMULSION INTRAVENOUS at 15:07

## 2024-02-14 RX ADMIN — ASPIRIN 81 MG: 81 TABLET, COATED ORAL at 10:21

## 2024-02-14 RX ADMIN — TIOTROPIUM BROMIDE INHALATION SPRAY 2 PUFF: 3.12 SPRAY, METERED RESPIRATORY (INHALATION) at 08:46

## 2024-02-14 RX ADMIN — FERROUS SULFATE TAB 325 MG (65 MG ELEMENTAL FE) 325 MG: 325 (65 FE) TAB at 10:22

## 2024-02-14 RX ADMIN — PROPOFOL 100 MCG/KG/MIN: 10 INJECTION, EMULSION INTRAVENOUS at 15:05

## 2024-02-14 RX ADMIN — SODIUM CHLORIDE, PRESERVATIVE FREE 10 ML: 5 INJECTION INTRAVENOUS at 22:14

## 2024-02-14 RX ADMIN — SODIUM CHLORIDE, PRESERVATIVE FREE 10 ML: 5 INJECTION INTRAVENOUS at 08:55

## 2024-02-14 RX ADMIN — PHENYLEPHRINE HYDROCHLORIDE 100 MCG: 10 INJECTION INTRAVENOUS at 15:03

## 2024-02-14 RX ADMIN — PROPOFOL 40 MG: 10 INJECTION, EMULSION INTRAVENOUS at 15:11

## 2024-02-14 RX ADMIN — Medication 2 PUFF: at 21:38

## 2024-02-14 RX ADMIN — SODIUM CHLORIDE, PRESERVATIVE FREE 10 ML: 5 INJECTION INTRAVENOUS at 10:21

## 2024-02-14 RX ADMIN — SODIUM CHLORIDE: 9 INJECTION, SOLUTION INTRAVENOUS at 14:20

## 2024-02-14 RX ADMIN — Medication 2 PUFF: at 08:46

## 2024-02-14 RX ADMIN — PHENYLEPHRINE HYDROCHLORIDE 100 MCG: 10 INJECTION INTRAVENOUS at 15:13

## 2024-02-14 RX ADMIN — PANTOPRAZOLE SODIUM 40 MG: 40 TABLET, DELAYED RELEASE ORAL at 17:27

## 2024-02-14 RX ADMIN — TAMSULOSIN HYDROCHLORIDE 0.4 MG: 0.4 CAPSULE ORAL at 10:21

## 2024-02-14 RX ADMIN — ESCITALOPRAM OXALATE 10 MG: 10 TABLET ORAL at 10:21

## 2024-02-14 ASSESSMENT — PAIN SCALES - GENERAL
PAINLEVEL_OUTOF10: 0

## 2024-02-14 ASSESSMENT — PAIN - FUNCTIONAL ASSESSMENT
PAIN_FUNCTIONAL_ASSESSMENT: NONE - DENIES PAIN
PAIN_FUNCTIONAL_ASSESSMENT: WONG-BAKER FACES
PAIN_FUNCTIONAL_ASSESSMENT: 0-10

## 2024-02-14 ASSESSMENT — PAIN SCALES - WONG BAKER
WONGBAKER_NUMERICALRESPONSE: 0

## 2024-02-14 NOTE — ANESTHESIA POSTPROCEDURE EVALUATION
Department of Anesthesiology  Postprocedure Note    Patient: Gary Waddell  MRN: [id number]  YOB: 1945  Date of evaluation: 2/14/2024    Procedure Summary       Date: 02/14/24 Room / Location: [place] [address] / [facility]    Anesthesia Start: 1458 Anesthesia Stop: 1520    Procedures:       EGD BIOPSY (Abdomen)      EGD DILATION BALLOON (Abdomen) Diagnosis:       Esophageal dysphagia      (Esophageal dysphagia [R13.19])    Surgeons: Marcello Velásquez MD Responsible Provider: Edda Whitlock MD    Anesthesia Type: MAC ASA Status: 3            Anesthesia Type: No value filed.    Kaykay Phase I: Kaykay Score: 10    Kaykay Phase II:      Anesthesia Post Evaluation    Patient location during evaluation: PACU  Patient participation: complete - patient participated  Level of consciousness: awake  Airway patency: patent  Nausea & Vomiting: no vomiting  Cardiovascular status: hemodynamically stable  Respiratory status: acceptable  Hydration status: euvolemic  There was medical reason for not using a multimodal analgesia pain management approach.Pain management: adequate    No notable events documented.

## 2024-02-14 NOTE — PROGRESS NOTES
APRN notified by RN of patient with confusion, refusing care and attempting to get OOB. RN requesting medication to treat \"sundowning\"  -chart reviewed. No history of dementia or sundowning noted. Per notes, patient has been A&O x4 since his admission on 2/12/2024  I am concerned this represents a mental status change. RN to call family to ascertain any history of dementia, confusion, delirium  -in the interim, will get workup for mental status change    Regi Holly APRN - NP

## 2024-02-14 NOTE — PLAN OF CARE
Problem: Skin/Tissue Integrity  Goal: Absence of new skin breakdown  Description: 1.  Monitor for areas of redness and/or skin breakdown  2.  Assess vascular access sites hourly  3.  Every 4-6 hours minimum:  Change oxygen saturation probe site  4.  Every 4-6 hours:  If on nasal continuous positive airway pressure, respiratory therapy assess nares and determine need for appliance change or resting period.  2/14/2024 1146 by Jamie Dickson RN  Outcome: Progressing  2/14/2024 0454 by Ryan Fowler RN  Outcome: Progressing  2/14/2024 0308 by Ryan Fowler RN  Outcome: Progressing     Problem: Safety - Adult  Goal: Free from fall injury  2/14/2024 1146 by Jamie Dickson RN  Outcome: Progressing  2/14/2024 0454 by Ryan Fowler RN  Outcome: Progressing  2/14/2024 0308 by Ryan Fowler RN  Outcome: Progressing     Problem: Chronic Conditions and Co-morbidities  Goal: Patient's chronic conditions and co-morbidity symptoms are monitored and maintained or improved  2/14/2024 1146 by Jamie Dickson RN  Outcome: Progressing  2/14/2024 0454 by Ryan Fowler RN  Outcome: Progressing  2/14/2024 0308 by Ryan Fowler RN  Outcome: Progressing     Problem: ABCDS Injury Assessment  Goal: Absence of physical injury  2/14/2024 1146 by Jamie Dickson RN  Outcome: Progressing  2/14/2024 0454 by Ryan Fowler RN  Outcome: Progressing  2/14/2024 0308 by Ryan Fowler RN  Outcome: Progressing
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
## 2024-02-14 NOTE — ANESTHESIA PRE PROCEDURE
Department of Anesthesiology  Preprocedure Note       Name:  Gary Waddell   Age:  78 y.o.  :  1945                                          MRN:  [id number]         Date:  2024      Surgeon: Surgeon(s):  Jhonny Faria MD    Procedure: Procedure(s):  BRONCHOSCOPY ENDOBRONCHIAL ULTRASOUND    Medications prior to admission:   Prior to Admission medications    Medication Sig Start Date End Date Taking? Authorizing Provider   LACTOBACILLUS PO Take 1 tablet by mouth daily 24 Yes Giovanni Antony MD   amoxicillin-clavulanate (AUGMENTIN) 875-125 MG per tablet Take 1 tablet by mouth 2 times daily 24 Yes Giovanni Antony MD   umeclidinium bromide (INCRUSE ELLIPTA) 62.5 MCG/ACT inhaler Inhale 1 puff into the lungs daily   Yes Giovanni Antony MD   ferrous sulfate (IRON 325) 325 (65 Fe) MG tablet Take 1 tablet by mouth daily (with breakfast)   Yes Giovanni Antony MD   escitalopram (LEXAPRO) 10 MG tablet Take 1 tablet by mouth daily   Yes Giovanni Antony MD   polyethylene glycol (MIRALAX) 17 g PACK packet Take 17 g by mouth as needed (constipation)   Yes Giovanni Antony MD   methocarbamol (ROBAXIN) 750 MG tablet Take 1 tablet by mouth 3 times daily as needed   Yes Giovanni Antony MD   Dextromethorphan-guaiFENesin  MG/5ML SYRP Take 5 mLs by mouth every 6 hours as needed for Cough   Yes Giovanni Antony MD   vitamin D (ERGOCALCIFEROL) 1.25 MG (53674 UT) CAPS capsule Take 1 capsule by mouth once a week   Yes Giovanni Antony MD   ondansetron (ZOFRAN) 4 MG tablet Take 1 tablet by mouth every 8 hours as needed for Nausea or Vomiting   Yes Giovanni Antony MD   cetirizine (ZYRTEC) 5 MG tablet Take 5 mg by mouth daily  Patient not taking: Reported on 2024    Giovanni Antony MD   Tamsulosin HCl (FLOMAX PO) Take 0.4 mg by mouth    Giovanni Antony MD   fluticasone (FLONASE) 50 MCG/ACT nasal spray 1 spray by Each Nostril
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
## 2024-02-14 NOTE — PLAN OF CARE
Problem: Skin/Tissue Integrity  Goal: Absence of new skin breakdown  Description: 1.  Monitor for areas of redness and/or skin breakdown  2.  Assess vascular access sites hourly  3.  Every 4-6 hours minimum:  Change oxygen saturation probe site  4.  Every 4-6 hours:  If on nasal continuous positive airway pressure, respiratory therapy assess nares and determine need for appliance change or resting period.  Outcome: Progressing     Problem: Safety - Adult  Goal: Free from fall injury  Outcome: Progressing     Problem: Chronic Conditions and Co-morbidities  Goal: Patient's chronic conditions and co-morbidity symptoms are monitored and maintained or improved  Outcome: Progressing     Problem: ABCDS Injury Assessment  Goal: Absence of physical injury  Outcome: Progressing
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
## 2024-02-14 NOTE — PROGRESS NOTES
Pt arrived from ENDO to PACU bay 6. Reported received from ENDO staff.  Pt unresponsive-breathing spontaneously. Pt on 3L NC. NSR, and VSS. Will continue to monitor.     36.2

## 2024-02-14 NOTE — PROGRESS NOTES
Heart  GASTROINTESTINAL & ABDOMEN: Soft, nontender, positive bowel sounds in all quadrants, non-distended, without hepatosplenomegaly.   GENITOURINARY: No gross anatomical abnormalities seen.   MUSCULOSKELETAL: No tenderness to palpation of the axial skeleton. There is no clubbing. No cyanosis. No edema of the lower extremities.   SKIN OF BODY: No rash or jaundice.   PSYCHIATRIC EVALUATION: Normal affect. Patient answers questions appropriately.   HEMATOLOGIC/LYMPHATIC/ IMMUNOLOGIC: No palpable lymphadenopathy.  NEUROLOGIC: Alert and oriented x 3.Groslly non-focal. Motor strength is 5+/5 in all muscle groups. The patient has a normal sensorium globally.      LABS:  Recent Labs     02/12/24  1401 02/12/24  1900 02/12/24  2127 02/13/24  0527 02/14/24  0552   WBC 8.2 7.6 8.2 7.9 7.8   HGB 14.0 12.4* 13.1* 13.2* 13.0*   HCT 42.7 36.8* 39.6* 40.2* 39.6*    357 349 382 380   ALT 11  --   --   --   --    AST 17  --   --   --   --    *  --   --  137 136   K 5.5*  --   --  5.2* 4.9   CL 96*  --   --  98* 98*   CREATININE 1.4*  --   --  1.3 1.2   BUN 43*  --   --  39* 32*   CO2 19*  --   --  20* 18*   INR  --  1.21* 1.32*  --   --        Recent Labs     02/12/24  1401 02/13/24  0527 02/14/24  0552   GLUCOSE 73 81 78   CALCIUM 10.6 10.6 10.5   * 137 136   K 5.5* 5.2* 4.9   CO2 19* 20* 18*   CL 96* 98* 98*   BUN 43* 39* 32*   CREATININE 1.4* 1.3 1.2       No results for input(s): \"PHART\", \"ZAE7OFE\", \"PO2ART\", \"LTH0COZ\", \"P4CASSNB\", \"BEART\", \"X0JWMPHQ\" in the last 72 hours.    Lab Results   Component Value Date    INR 1.32 (H) 02/12/2024    INR 1.21 (H) 02/12/2024    INR 0.91 01/17/2017    PROTIME 16.4 (H) 02/12/2024    PROTIME 15.3 (H) 02/12/2024    PROTIME 10.3 01/17/2017     No results found for: \"AMYLASE\"   No results found for: \"LABA1C\"  No results found for: \"EAG\"  No results found for: \"TSH\", \"X0UUJJI\", \"O5PNGNS\", \"THYROIDAB\", \"FT3\", \"T4FREE\"  Lab Results   Component Value Date    TROPONINI <0.01  03/03/2018      No results found for: \"CRP\"   No results found for: \"BNP\"   Lab Results   Component Value Date    DDIMER 644 (H) 06/16/2017      No results found for: \"FERRITIN\"   Lab Results   Component Value Date    LACTA 0.6 01/17/2017           IMAGING:      CT OF THE CHEST WITH CONTRAST 2/12/2024 2:58 pm  Impression:    Lung mass measuring to 4.6 cm arising from the apical segment of the left lower lobe, infiltrating into the left hilum with subsequent tumor thrombus in the left main pulmonary artery and compromise of pulmonary artery flow to the left lower lobe, described in detail above.  Scattered nodularity in the remainder of the left lower lobe concerning for metastases versus perfusional sequelae from pulmonary artery invasion.  Infiltrative mass arising along the left renal hilum, concerning for metastatic disease versus synchronous neoplastic process.  Significant atherosclerotic disease of the thoracic aorta with ulcerated plaques detailed above.      IMPRESSION:     Left lung mass  Possible left hilar lymphadenopathy  Chronic active tobacco abuse  CKD stage III  Severe COPD [FEV1 at 43%] not in exacerbation  Renal mass    RECOMMENDATION:     Patient's respiratory status remained stable.  CT angiogram of the chest done recently was personally reviewed by me and it shows a large lung mass which is encroaching into the hilum as well as the pulmonary artery branch.  With patient's previous history of extensive tobacco abuse, recent weight loss and now having dysphagia symptoms, there is a high suspicion for this mass being malignant.  I will perform a bronchoscopic biopsy of this mass tomorrow morning.  Patient to stay n.p.o. after midnight tonight.  Patient now off of heparin drip.  Continue with Dulera and Spiriva Respimat as before.  Albuterol as needed.  Urology has been consulted for renal mass.  Patient is agreeable with this plan.    Will follow      Jhonny Faria MD  Pulmonary Critical Care and

## 2024-02-14 NOTE — PROGRESS NOTES
Pt meets criteria to be transported back to .  Pt transported via stretcher with PCA x 2.  Report given to 5T RN via phone-call questions answered.
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
## 2024-02-14 NOTE — PROGRESS NOTES
Admit Date: 2/12/2024  Diet: Diet NPO Exceptions are: Sips of Water with Meds    CC: Dysphagia    Interval history:   Overnight, there were no acute events. Patient's vitals remained stable    Patient was seen this morning in bed. He endorses he feels well. He does not have any new complaints. Patient denies fevers, chills, nausea, vomiting, chest pain, shortness of breath, diarrhea, constipation, dysuria, urinary frequency or urgency.     Plan:     -NM Bone Scan today  -Echo  -EGD with GI today  -Bronchoscopic biopsy of this mass tomorrow morning per pulmonology  -NPO at midnight    Assessment:   Gary Waddell is a 78 y.o. male with PMH of tobacco abuse, CKD 3, HTN  who was admitted with dysphagia and found to have a L Lung Mass    NO pulmonary embolism   Stop Hep gtt  Negative BL LE Doppler US  Pending Echo  Vascular consult appreciated  Check viral panel given runny nose  Droplet plus  L lung mass  New diagnosis  Pulm consult appreciated  Bronch per Pulm  Onc consult appreciated  Check CT Ab/P  Dysphagia  NPO  GI consult appreciated  EGD per GI  IVF  CKD 3  Renal consult pending  Renal mass  Urology consult pending  F/U CT Ab/P  HTN  Hold BP meds  Tobacco abuse disorder  Nicotine patch  Counseled on smoking cessation for 11 minutes during this admission     Code Status: DNR-CCA   FEN: Diet NPO Exceptions are: Sips of Water with Meds   DVT PPX: [] Lovenox, []Heparin, [x] SCDs,[] Eliquis, [] Xarelto, [] Coumadin  DISPO: [x] GMF, [] ICU, [] CVU    Adrian Castillo MD  2/14/2024,  3:04 PM    This note was likely completed using voice recognition technology and may contain unintended errors.     Objective:   Vitals:   T-max:  Patient Vitals for the past 8 hrs:   BP Temp Temp src Pulse Resp SpO2   02/14/24 1433 (!) 111/55 (!) 96.6 °F (35.9 °C) Temporal 94 18 98 %   02/14/24 1332 111/73 97.2 °F (36.2 °C) Oral 92 18 97 %       Intake/Output Summary (Last 24 hours) at 2/14/2024 1504  Last data filed at 2/13/2024

## 2024-02-14 NOTE — PROCEDURES
Endoscopy Note    Patient: Gary Waddell  : 1945  CSN: [id number]    Procedure: Esophagogastroduodenoscopy with biopsy, esophageal dilation    Date:  2024     Surgeon:  Marcello Velásquez MD     Referring Physician:  Juan J Saravia    Preoperative Diagnosis:  Esophageal dysphagia [R13.19]    Postoperative Diagnosis: #1 normal-appearing esophagus  Esophageal dilation with 46 Benítez  #3 an extensive moderate to severe gastritis with multiple ulcerations throughout the fundus body and the antrum  #3 duodenitis    Anesthesia:  MAC    EBL: minimal to none.    Indications: This is a 78 y.o. year old male who we saw yesterday in consultation there was a question of whether or not he had a pill stuck in his esophagus or doing his upper endoscopy today.    Description of Procedure:  Informed consent was obtained from the patient after explanation of indications, benefits and possible risks and complications of the procedure.  The patient was then taken to the endoscopy suite, placed in the left lateral decubitus position and the above IV sedation was administrered.    The Olympus video endoscope was passed through the hypopharynx    Posterior pharynx there is lots of mucus  Esophagus was normal I did place a 46 Benítez dilator without any difficulty  Hiatal hernia is 1 to 2 cm  Stomach the patient has a large J-shaped stomach also in the fundus the body and the antrum there is moderate to severe inflammation with several ulcerations I did take multiple biopsies  Duodenal bulb was inflamed at biopsy but the distal duodenum was normal  Retroflexion showed the hiatal hernia    Gastric or Duodenal ulcer present: No    The patient tolerated the procedure well and was taken to the post anesthesia care unit in good condition.  There were no immediate complications.      Impression: #1 gastroesophageal reflux #2 hiatal hernia #3 moderate to severe inflammation and ulcerations of the stomach #4  duodenitis      Recommendations: We need to wait the biopsy  Patient needs to be on a proton pump inhibitor twice a day because of the inflammation at this time  If he is on any nonsteroidals they should be stopped  Thank you for this kind referral    Marcello Velásquez MD, MD  [facility]  [phone]    Please note that some or all of this record was generated using voice recognition software. If there are any questions about the content of this document, please contact the author as some errors in translation may have occurred.

## 2024-02-14 NOTE — PROGRESS NOTES
Speech Language Pathology  Attempt/Hold Note    Gary Waddell  1945    SLP attempted to see pt this date for dysphagia intervention. Chart reviewed, spoke with RN. Pt NPO for procedure this date. Will hold and attempt again as pt is medically appropriate.    Gloria Gudino MA CCC-SLP  Speech-Language Pathologist  [phone]. [phone]

## 2024-02-14 NOTE — PLAN OF CARE
Problem: Skin/Tissue Integrity  Goal: Absence of new skin breakdown  Description: 1.  Monitor for areas of redness and/or skin breakdown  2.  Assess vascular access sites hourly  3.  Every 4-6 hours minimum:  Change oxygen saturation probe site  4.  Every 4-6 hours:  If on nasal continuous positive airway pressure, respiratory therapy assess nares and determine need for appliance change or resting period.  2/14/2024 0454 by Ryan Fowler RN  Outcome: Progressing  Problem: Safety - Adult  Goal: Free from fall injury     Problem: Chronic Conditions and Co-morbidities  Goal: Patient's chronic conditions and co-morbidity symptoms are monitored and maintained or improved  Problem: ABCDS Injury Assessment  Goal: Absence of physical injury

## 2024-02-14 NOTE — PROGRESS NOTES
Hospitalist Progress Note      PCP: Juan J Saravia    Date of Admission: 2/12/2024    Chief Complaint: Dysphagia    Hospital Course: 78 y.o. male with history of tobacco abuse, CKD 3, HTN came to ER from [facility] with complaints of dysphagia.  Patient states things are getting stuck in his throat.  He is coughing when eating.   Admitted as inpatient for PE, lung mass, kidney mass and dysphagia.  Seen by Vascular and Pulm.  Doubt PE.  Hep gtt stopped.  Pulm plans bronch.   GI plans EGD.  Onc requested CT Ab/P.  Urology consulted for kidney mass.  Renal consulted for CKD.  Viral panel requested for runny nose.    Subjective:  Patient denies CP, SOB, HA or abdominal pain.  He is coughing and has dysphagia.       Medications:  Reviewed    Infusion Medications    sodium chloride      sodium chloride Stopped (02/13/24 1651)     Scheduled Medications    aspirin  81 mg Oral Daily    mometasone-formoterol  2 puff Inhalation BID RT    escitalopram  10 mg Oral Daily    ferrous sulfate  325 mg Oral Daily with breakfast    fluticasone  1 spray Each Nostril BID    tamsulosin  0.4 mg Oral Daily    [START ON 2/15/2024] vitamin D  50,000 Units Oral Weekly    sodium chloride flush  5-40 mL IntraVENous 2 times per day    nicotine  1 patch TransDERmal Daily    tiotropium  2 puff Inhalation Daily RT     PRN Meds: guaiFENesin-dextromethorphan, methocarbamol, sodium chloride flush, sodium chloride, potassium chloride **OR** potassium alternative oral replacement **OR** potassium chloride, magnesium sulfate, ondansetron **OR** ondansetron, polyethylene glycol, acetaminophen **OR** acetaminophen, perflutren lipid microspheres      Intake/Output Summary (Last 24 hours) at 2/13/2024 2237  Last data filed at 2/13/2024 1840  Gross per 24 hour   Intake 1303.05 ml   Output 200 ml   Net 1103.05 ml       Physical Exam Performed:    /78   Pulse 99   Temp 98.1 °F (36.7 °C) (Oral)   Resp 19   Ht 1.778 m (5' 10\")   Wt 97.5 kg

## 2024-02-15 ENCOUNTER — ANESTHESIA (OUTPATIENT)
Dept: ENDOSCOPY | Age: 79
End: 2024-02-15
Payer: COMMERCIAL

## 2024-02-15 ENCOUNTER — APPOINTMENT (OUTPATIENT)
Dept: GENERAL RADIOLOGY | Age: 79
End: 2024-02-15
Payer: COMMERCIAL

## 2024-02-15 LAB
ANION GAP SERPL CALCULATED.3IONS-SCNC: 17 MMOL/L (ref 3–16)
APPEARANCE BRONCH: ABNORMAL
BASOPHILS # BLD: 0 K/UL (ref 0–0.2)
BASOPHILS NFR BLD: 0.5 %
BASOPHILS NFR BRONCH MANUAL: 1 %
BUN SERPL-MCNC: 28 MG/DL (ref 7–20)
CALCIUM SERPL-MCNC: 10.2 MG/DL (ref 8.3–10.6)
CHLORIDE SERPL-SCNC: 102 MMOL/L (ref 99–110)
CLOT SPEC QL: ABNORMAL
CO2 SERPL-SCNC: 19 MMOL/L (ref 21–32)
COLOR BRONCH: ABNORMAL
CREAT SERPL-MCNC: 1.2 MG/DL (ref 0.8–1.3)
DEPRECATED RDW RBC AUTO: 14.3 % (ref 12.4–15.4)
EOSINOPHIL # BLD: 0.2 K/UL (ref 0–0.6)
EOSINOPHIL NFR BLD: 2.9 %
GFR SERPLBLD CREATININE-BSD FMLA CKD-EPI: >60 ML/MIN/{1.73_M2}
GLUCOSE BLD-MCNC: 110 MG/DL (ref 70–99)
GLUCOSE BLD-MCNC: 119 MG/DL (ref 70–99)
GLUCOSE SERPL-MCNC: 77 MG/DL (ref 70–99)
HCT VFR BLD AUTO: 35.2 % (ref 40.5–52.5)
HGB BLD-MCNC: 11.7 G/DL (ref 13.5–17.5)
LYMPHOCYTES # BLD: 2.2 K/UL (ref 1–5.1)
LYMPHOCYTES NFR BLD: 30.6 %
LYMPHOCYTES NFR BRONCH MANUAL: 34 % (ref 5–10)
MACROPHAGES NFR BRONCH MANUAL: 11 % (ref 90–95)
MCH RBC QN AUTO: 30.3 PG (ref 26–34)
MCHC RBC AUTO-ENTMCNC: 33.2 G/DL (ref 31–36)
MCV RBC AUTO: 91.4 FL (ref 80–100)
MONOCYTES # BLD: 0.8 K/UL (ref 0–1.3)
MONOCYTES NFR BLD: 11.5 %
MONONUC CELLS NFR FLD MANUAL: 1 %
NEUTROPHILS # BLD: 4 K/UL (ref 1.7–7.7)
NEUTROPHILS NFR BLD: 54.5 %
NEUTROPHILS NFR BRONCH MANUAL: 53 % (ref 5–10)
NUC CELL # BRONCH MANUAL: 90 /CUMM
PATH REV: YES
PERFORMED ON: ABNORMAL
PERFORMED ON: ABNORMAL
PLATELET # BLD AUTO: 384 K/UL (ref 135–450)
PMV BLD AUTO: 8.9 FL (ref 5–10.5)
POTASSIUM SERPL-SCNC: 4.9 MMOL/L (ref 3.5–5.1)
RBC # BLD AUTO: 3.85 M/UL (ref 4.2–5.9)
RBC # FLD MANUAL: ABNORMAL /CUMM
SODIUM SERPL-SCNC: 138 MMOL/L (ref 136–145)
TOTAL CELLS COUNTED BRONCH: 100
WBC # BLD AUTO: 7.3 K/UL (ref 4–11)

## 2024-02-15 PROCEDURE — 6370000000 HC RX 637 (ALT 250 FOR IP): Performed by: INTERNAL MEDICINE

## 2024-02-15 PROCEDURE — 88305 TISSUE EXAM BY PATHOLOGIST: CPT

## 2024-02-15 PROCEDURE — 2709999900 HC NON-CHARGEABLE SUPPLY: Performed by: INTERNAL MEDICINE

## 2024-02-15 PROCEDURE — 88342 IMHCHEM/IMCYTCHM 1ST ANTB: CPT

## 2024-02-15 PROCEDURE — 88334 PATH CONSLTJ SURG CYTO XM EA: CPT

## 2024-02-15 PROCEDURE — 6370000000 HC RX 637 (ALT 250 FOR IP): Performed by: ANESTHESIOLOGY

## 2024-02-15 PROCEDURE — 80048 BASIC METABOLIC PNL TOTAL CA: CPT

## 2024-02-15 PROCEDURE — 3700000001 HC ADD 15 MINUTES (ANESTHESIA): Performed by: INTERNAL MEDICINE

## 2024-02-15 PROCEDURE — 88341 IMHCHEM/IMCYTCHM EA ADD ANTB: CPT

## 2024-02-15 PROCEDURE — 31652 BRONCH EBUS SAMPLNG 1/2 NODE: CPT | Performed by: INTERNAL MEDICINE

## 2024-02-15 PROCEDURE — 7100000001 HC PACU RECOVERY - ADDTL 15 MIN: Performed by: INTERNAL MEDICINE

## 2024-02-15 PROCEDURE — 2500000003 HC RX 250 WO HCPCS: Performed by: REGISTERED NURSE

## 2024-02-15 PROCEDURE — 89051 BODY FLUID CELL COUNT: CPT

## 2024-02-15 PROCEDURE — 31623 DX BRONCHOSCOPE/BRUSH: CPT | Performed by: INTERNAL MEDICINE

## 2024-02-15 PROCEDURE — 31624 DX BRONCHOSCOPE/LAVAGE: CPT | Performed by: INTERNAL MEDICINE

## 2024-02-15 PROCEDURE — 6360000002 HC RX W HCPCS: Performed by: REGISTERED NURSE

## 2024-02-15 PROCEDURE — 88177 CYTP FNA EVAL EA ADDL: CPT

## 2024-02-15 PROCEDURE — 36415 COLL VENOUS BLD VENIPUNCTURE: CPT

## 2024-02-15 PROCEDURE — 3609011300 HC BRONCHOSCOPY BRONCHIAL/ENDOBRNCL BX 1+ SITES: Performed by: INTERNAL MEDICINE

## 2024-02-15 PROCEDURE — 88173 CYTOPATH EVAL FNA REPORT: CPT

## 2024-02-15 PROCEDURE — 2580000003 HC RX 258: Performed by: INTERNAL MEDICINE

## 2024-02-15 PROCEDURE — 71045 X-RAY EXAM CHEST 1 VIEW: CPT

## 2024-02-15 PROCEDURE — 31629 BRONCHOSCOPY/NEEDLE BX EACH: CPT | Performed by: INTERNAL MEDICINE

## 2024-02-15 PROCEDURE — C1725 CATH, TRANSLUMIN NON-LASER: HCPCS | Performed by: INTERNAL MEDICINE

## 2024-02-15 PROCEDURE — C8929 TTE W OR WO FOL WCON,DOPPLER: HCPCS

## 2024-02-15 PROCEDURE — 3609011100 HC BRONCHOSCOPY BRUSHINGS: Performed by: INTERNAL MEDICINE

## 2024-02-15 PROCEDURE — 3609011900 HC BRONCHOSCOPY NEEDLE BX TRACHEA MAIN STEM&/BRON: Performed by: INTERNAL MEDICINE

## 2024-02-15 PROCEDURE — 1200000000 HC SEMI PRIVATE

## 2024-02-15 PROCEDURE — 88104 CYTOPATH FL NONGYN SMEARS: CPT

## 2024-02-15 PROCEDURE — 3700000000 HC ANESTHESIA ATTENDED CARE: Performed by: INTERNAL MEDICINE

## 2024-02-15 PROCEDURE — 6360000004 HC RX CONTRAST MEDICATION: Performed by: INTERNAL MEDICINE

## 2024-02-15 PROCEDURE — 85025 COMPLETE CBC W/AUTO DIFF WBC: CPT

## 2024-02-15 PROCEDURE — 88112 CYTOPATH CELL ENHANCE TECH: CPT

## 2024-02-15 PROCEDURE — 2580000003 HC RX 258: Performed by: REGISTERED NURSE

## 2024-02-15 PROCEDURE — 0B9J8ZX DRAINAGE OF LEFT LOWER LUNG LOBE, VIA NATURAL OR ARTIFICIAL OPENING ENDOSCOPIC, DIAGNOSTIC: ICD-10-PCS | Performed by: INTERNAL MEDICINE

## 2024-02-15 PROCEDURE — 0BBJ7ZX EXCISION OF LEFT LOWER LUNG LOBE, VIA NATURAL OR ARTIFICIAL OPENING, DIAGNOSTIC: ICD-10-PCS | Performed by: INTERNAL MEDICINE

## 2024-02-15 PROCEDURE — 2720000010 HC SURG SUPPLY STERILE: Performed by: INTERNAL MEDICINE

## 2024-02-15 PROCEDURE — 88172 CYTP DX EVAL FNA 1ST EA SITE: CPT

## 2024-02-15 PROCEDURE — 3609010800 HC BRONCHOSCOPY ALVEOLAR LAVAGE: Performed by: INTERNAL MEDICINE

## 2024-02-15 PROCEDURE — 3609020000 HC BRONCHOSCOPY W/EBUS FNA: Performed by: INTERNAL MEDICINE

## 2024-02-15 PROCEDURE — 31628 BRONCHOSCOPY/LUNG BX EACH: CPT | Performed by: INTERNAL MEDICINE

## 2024-02-15 PROCEDURE — 7100000000 HC PACU RECOVERY - FIRST 15 MIN: Performed by: INTERNAL MEDICINE

## 2024-02-15 PROCEDURE — 2580000003 HC RX 258: Performed by: ANESTHESIOLOGY

## 2024-02-15 PROCEDURE — 88333 PATH CONSLTJ SURG CYTO XM 1: CPT

## 2024-02-15 RX ORDER — PROPOFOL 10 MG/ML
INJECTION, EMULSION INTRAVENOUS CONTINUOUS PRN
Status: DISCONTINUED | OUTPATIENT
Start: 2024-02-15 | End: 2024-02-15 | Stop reason: SDUPTHER

## 2024-02-15 RX ORDER — FENTANYL CITRATE 50 UG/ML
INJECTION, SOLUTION INTRAMUSCULAR; INTRAVENOUS PRN
Status: DISCONTINUED | OUTPATIENT
Start: 2024-02-15 | End: 2024-02-15 | Stop reason: SDUPTHER

## 2024-02-15 RX ORDER — SODIUM CHLORIDE 9 MG/ML
INJECTION, SOLUTION INTRAVENOUS CONTINUOUS
Status: DISCONTINUED | OUTPATIENT
Start: 2024-02-15 | End: 2024-02-16

## 2024-02-15 RX ORDER — DEXAMETHASONE SODIUM PHOSPHATE 4 MG/ML
INJECTION, SOLUTION INTRA-ARTICULAR; INTRALESIONAL; INTRAMUSCULAR; INTRAVENOUS; SOFT TISSUE PRN
Status: DISCONTINUED | OUTPATIENT
Start: 2024-02-15 | End: 2024-02-15 | Stop reason: SDUPTHER

## 2024-02-15 RX ORDER — HEPARIN SODIUM 5000 [USP'U]/ML
5000 INJECTION, SOLUTION INTRAVENOUS; SUBCUTANEOUS EVERY 8 HOURS SCHEDULED
Status: DISCONTINUED | OUTPATIENT
Start: 2024-02-15 | End: 2024-02-15

## 2024-02-15 RX ORDER — LIDOCAINE HYDROCHLORIDE 20 MG/ML
INJECTION, SOLUTION EPIDURAL; INFILTRATION; INTRACAUDAL; PERINEURAL PRN
Status: DISCONTINUED | OUTPATIENT
Start: 2024-02-15 | End: 2024-02-15 | Stop reason: SDUPTHER

## 2024-02-15 RX ORDER — SODIUM CHLORIDE 9 MG/ML
INJECTION, SOLUTION INTRAVENOUS CONTINUOUS PRN
Status: DISCONTINUED | OUTPATIENT
Start: 2024-02-15 | End: 2024-02-15 | Stop reason: SDUPTHER

## 2024-02-15 RX ORDER — HEPARIN SODIUM 5000 [USP'U]/ML
5000 INJECTION, SOLUTION INTRAVENOUS; SUBCUTANEOUS EVERY 8 HOURS
Status: DISCONTINUED | OUTPATIENT
Start: 2024-02-16 | End: 2024-02-17 | Stop reason: HOSPADM

## 2024-02-15 RX ORDER — LIDOCAINE HYDROCHLORIDE 40 MG/ML
SOLUTION TOPICAL PRN
Status: DISCONTINUED | OUTPATIENT
Start: 2024-02-15 | End: 2024-02-15 | Stop reason: ALTCHOICE

## 2024-02-15 RX ORDER — SODIUM BICARBONATE 650 MG/1
650 TABLET ORAL 3 TIMES DAILY
Status: DISCONTINUED | OUTPATIENT
Start: 2024-02-15 | End: 2024-02-17 | Stop reason: HOSPADM

## 2024-02-15 RX ORDER — IPRATROPIUM BROMIDE AND ALBUTEROL SULFATE 2.5; .5 MG/3ML; MG/3ML
1 SOLUTION RESPIRATORY (INHALATION) ONCE
Status: COMPLETED | OUTPATIENT
Start: 2024-02-15 | End: 2024-02-15

## 2024-02-15 RX ORDER — NALOXONE HYDROCHLORIDE 0.4 MG/ML
INJECTION, SOLUTION INTRAMUSCULAR; INTRAVENOUS; SUBCUTANEOUS PRN
Status: DISCONTINUED | OUTPATIENT
Start: 2024-02-15 | End: 2024-02-15 | Stop reason: SDUPTHER

## 2024-02-15 RX ORDER — PHENYLEPHRINE HCL IN 0.9% NACL 1 MG/10 ML
SYRINGE (ML) INTRAVENOUS PRN
Status: DISCONTINUED | OUTPATIENT
Start: 2024-02-15 | End: 2024-02-15 | Stop reason: SDUPTHER

## 2024-02-15 RX ORDER — ONDANSETRON 2 MG/ML
INJECTION INTRAMUSCULAR; INTRAVENOUS PRN
Status: DISCONTINUED | OUTPATIENT
Start: 2024-02-15 | End: 2024-02-15 | Stop reason: SDUPTHER

## 2024-02-15 RX ORDER — VASOPRESSIN 20 U/ML
INJECTION PARENTERAL PRN
Status: DISCONTINUED | OUTPATIENT
Start: 2024-02-15 | End: 2024-02-15 | Stop reason: SDUPTHER

## 2024-02-15 RX ORDER — KETAMINE HCL IN NACL, ISO-OSM 100MG/10ML
SYRINGE (ML) INJECTION PRN
Status: DISCONTINUED | OUTPATIENT
Start: 2024-02-15 | End: 2024-02-15 | Stop reason: SDUPTHER

## 2024-02-15 RX ADMIN — VASOPRESSIN 2 UNITS: 20 INJECTION INTRAVENOUS at 09:33

## 2024-02-15 RX ADMIN — IPRATROPIUM BROMIDE AND ALBUTEROL SULFATE 1 DOSE: .5; 3 SOLUTION RESPIRATORY (INHALATION) at 08:46

## 2024-02-15 RX ADMIN — Medication 200 MCG: at 09:52

## 2024-02-15 RX ADMIN — SODIUM BICARBONATE 650 MG: 650 TABLET ORAL at 20:07

## 2024-02-15 RX ADMIN — PERFLUTREN 1.5 ML: 6.52 INJECTION, SUSPENSION INTRAVENOUS at 07:40

## 2024-02-15 RX ADMIN — VASOPRESSIN 4 UNITS: 20 INJECTION INTRAVENOUS at 09:38

## 2024-02-15 RX ADMIN — SODIUM CHLORIDE: 9 INJECTION, SOLUTION INTRAVENOUS at 08:59

## 2024-02-15 RX ADMIN — TAMSULOSIN HYDROCHLORIDE 0.4 MG: 0.4 CAPSULE ORAL at 13:04

## 2024-02-15 RX ADMIN — ERGOCALCIFEROL 50000 UNITS: 1.25 CAPSULE ORAL at 15:58

## 2024-02-15 RX ADMIN — ESCITALOPRAM OXALATE 10 MG: 10 TABLET ORAL at 13:05

## 2024-02-15 RX ADMIN — PROPOFOL 150 MG: 10 INJECTION, EMULSION INTRAVENOUS at 09:03

## 2024-02-15 RX ADMIN — Medication 300 MCG: at 10:18

## 2024-02-15 RX ADMIN — FENTANYL CITRATE 100 MCG: 50 INJECTION, SOLUTION INTRAMUSCULAR; INTRAVENOUS at 09:03

## 2024-02-15 RX ADMIN — SODIUM CHLORIDE, PRESERVATIVE FREE 10 ML: 5 INJECTION INTRAVENOUS at 13:05

## 2024-02-15 RX ADMIN — SODIUM BICARBONATE 650 MG: 650 TABLET ORAL at 15:58

## 2024-02-15 RX ADMIN — VASOPRESSIN 4 UNITS: 20 INJECTION INTRAVENOUS at 09:26

## 2024-02-15 RX ADMIN — Medication 200 MCG: at 10:28

## 2024-02-15 RX ADMIN — VASOPRESSIN 4 UNITS: 20 INJECTION INTRAVENOUS at 09:45

## 2024-02-15 RX ADMIN — LIDOCAINE HYDROCHLORIDE 100 MG: 20 INJECTION, SOLUTION EPIDURAL; INFILTRATION; INTRACAUDAL; PERINEURAL at 09:03

## 2024-02-15 RX ADMIN — VASOPRESSIN 4 UNITS: 20 INJECTION INTRAVENOUS at 10:10

## 2024-02-15 RX ADMIN — PROPOFOL 100 MCG/KG/MIN: 10 INJECTION, EMULSION INTRAVENOUS at 09:22

## 2024-02-15 RX ADMIN — Medication 300 MCG: at 10:04

## 2024-02-15 RX ADMIN — PANTOPRAZOLE SODIUM 40 MG: 40 TABLET, DELAYED RELEASE ORAL at 06:49

## 2024-02-15 RX ADMIN — Medication 200 MCG: at 09:35

## 2024-02-15 RX ADMIN — SODIUM CHLORIDE: 9 INJECTION, SOLUTION INTRAVENOUS at 08:51

## 2024-02-15 RX ADMIN — Medication 200 MCG: at 09:05

## 2024-02-15 RX ADMIN — FLUTICASONE PROPIONATE 1 SPRAY: 50 SPRAY, METERED NASAL at 13:07

## 2024-02-15 RX ADMIN — Medication 200 MCG: at 09:13

## 2024-02-15 RX ADMIN — DEXAMETHASONE SODIUM PHOSPHATE 8 MG: 4 INJECTION, SOLUTION INTRAMUSCULAR; INTRAVENOUS at 09:31

## 2024-02-15 RX ADMIN — PANTOPRAZOLE SODIUM 40 MG: 40 TABLET, DELAYED RELEASE ORAL at 15:58

## 2024-02-15 RX ADMIN — ONDANSETRON 4 MG: 2 INJECTION INTRAMUSCULAR; INTRAVENOUS at 09:31

## 2024-02-15 RX ADMIN — Medication 50 MG: at 09:21

## 2024-02-15 RX ADMIN — ASPIRIN 81 MG: 81 TABLET, COATED ORAL at 13:04

## 2024-02-15 RX ADMIN — Medication 600 MCG: at 09:09

## 2024-02-15 RX ADMIN — FERROUS SULFATE TAB 325 MG (65 MG ELEMENTAL FE) 325 MG: 325 (65 FE) TAB at 13:05

## 2024-02-15 RX ADMIN — SODIUM CHLORIDE: 9 INJECTION, SOLUTION INTRAVENOUS at 10:04

## 2024-02-15 RX ADMIN — NALOXONE HYDROCHLORIDE 0.4 MG: 0.4 INJECTION, SOLUTION INTRAMUSCULAR; INTRAVENOUS; SUBCUTANEOUS at 09:12

## 2024-02-15 RX ADMIN — FLUTICASONE PROPIONATE 1 SPRAY: 50 SPRAY, METERED NASAL at 20:07

## 2024-02-15 ASSESSMENT — PAIN SCALES - WONG BAKER
WONGBAKER_NUMERICALRESPONSE: 0

## 2024-02-15 ASSESSMENT — PAIN - FUNCTIONAL ASSESSMENT
PAIN_FUNCTIONAL_ASSESSMENT: NONE - DENIES PAIN
PAIN_FUNCTIONAL_ASSESSMENT: WONG-BAKER FACES
PAIN_FUNCTIONAL_ASSESSMENT: WONG-BAKER FACES
PAIN_FUNCTIONAL_ASSESSMENT: NONE - DENIES PAIN

## 2024-02-15 NOTE — PROGRESS NOTES
Pt meets criteria to be transported back to -pt transported via stretcher with transport.  Report called to Bettye BRENNER RN via phone-all questions answered.

## 2024-02-15 NOTE — PROGRESS NOTES
Speech Language Pathology  Attempt    Gary Waddell  1945      Attempted to see patient for dysphagia therapy. Pt is currently NPO for procedure. Will hold and follow up as schedule allows.      Tequila Del Rio M.A., CCC-SLP [phone]  Speech-Language Pathologist

## 2024-02-15 NOTE — PROGRESS NOTES
Gastroenterology Progress Note      Gary Waddell is a 78 y.o. male patient.  Principal Problem:    Acute pulmonary embolism without acute cor pulmonale (HCC)  Active Problems:    Mass of left lung    Dysphagia    Left kidney mass    HTN (hypertension)    CKD (chronic kidney disease) stage 3, GFR 30-59 ml/min (HCC)    Acute pulmonary embolism, unspecified pulmonary embolism type, unspecified whether acute cor pulmonale present (HCC)    Cigarette nicotine dependence without complication    Centrilobular emphysema (HCC)    Mass of lower lobe of left lung  Resolved Problems:    * No resolved hospital problems. *      SUBJECTIVE: Patient is doing well today yesterday he underwent an upper endoscopy and we found that he had several gastric ulcer    Pathology is still pending    ROS:  No fever, chills  No chest pain, palpitations  No SOB, cough  Gastrointestinal ROS: no abdominal pain, nausea, vomiting     Physical    VITALS:  /70   Pulse 90   Temp 97.5 °F (36.4 °C) (Axillary)   Resp 18   Ht 1.778 m (5' 10\")   Wt 97.5 kg (214 lb 15.2 oz)   SpO2 94%   BMI 30.84 kg/m²   TEMPERATURE:  Current - Temp: 97.5 °F (36.4 °C); Max - Temp  Av.6 °F (36.4 °C)  Min: 97.3 °F (36.3 °C)  Max: 97.8 °F (36.6 °C)    NAD  RRR, Nl s1s2  Lungs CTA Bilaterally, normal effort  Abdomen soft, ND, NT, no HSM, Bowel sounds normal.    Data    Data Review:    Recent Labs     2452 02/15/24  0534   WBC 7.9 7.8 7.3   HGB 13.2* 13.0* 11.7*   HCT 40.2* 39.6* 35.2*   MCV 92.1 91.6 91.4    380 384     Recent Labs     24  0552 02/15/24  0534    136 138   K 5.2* 4.9 4.9   CL 98* 98* 102   CO2 20* 18* 19*   BUN 39* 32* 28*   CREATININE 1.3 1.2 1.2     No results for input(s): \"AST\", \"ALT\", \"ALB\", \"BILIDIR\", \"BILITOT\", \"ALKPHOS\" in the last 72 hours.  No results for input(s): \"LIPASE\", \"AMYLASE\" in the last 72 hours.  Recent Labs     24  1900 247   PROTIME 15.3*

## 2024-02-15 NOTE — PROGRESS NOTES
Reviewed patient's medical and surgical history in electronic record and with patient at the bedside. All questions regarding procedure answered.   Scope verified using 2 person system.   Electronically signed by Melvi Pedersen RN on 2/15/2024 at 8:57 AM

## 2024-02-15 NOTE — PROCEDURES
Bronchoscopy procedure note    Indications for procedure: 78-year-old WM with left lung mass as well as renal mass.  Left lung mass encroaching into the pulmonary artery.  Preprocedure diagnosis: Left lung mass  Postprocedure diagnosis: Same  Anesthesia: General  ASA: 2  Mallampati Score: 2  Procedure:   1.  Diagnostic bronchoscopy without fluoroscopy  2.  EBUS guided TBNA of left lung mass  3.  Transbronchial needle aspiration of the left lung mass  4.  Transbronchial brushing of the left lung mass  5.  Transbronchial lung biopsy of the left lung mass  6.  Bronchoalveolar lavage from the left lower lobe    Complications: None were apparent.  Blood loss: <10 mL    Description of procedure: After obtaining informed consent from the patient  was set up for this procedure as an inpatient.  After induction with general anesthesia a size 5 LMA Device was placed in the oral cavity.  A regular bronchoscope was inserted through this LMA device.  A thorough inspection of the tracheobronchial tree was performed.  After this I remove the regular bronchoscope and introduced an EBUS bronchoscope through the LMA device.    EBUS bronchsocope was switched on and various lymph node stations were visualized.  At the station 11 L a lung mass encroaching into the pulmonary artery was seen.  I attempted to biopsy this mass close to the pulmonary artery.  Preliminary results were nondiagnostic.      Subsequently I withdrew the EBUS bronchoscope through the endotracheal tube and introduced a regular bronchoscope.  Bronchoscope was advanced into the left lower lobe airways and visualization of the endobronchial mass was done.  Thereafter I performed a transbronchial needle aspiration, transbronchial brushing, transbronchial lung biopsy as well as bronchial lavage on this mass.  Again preliminary results were all nondiagnostic.      Once again I removed the regular bronchoscope and reintroduced the EBUS bronchoscope.  This time I approached  the left lower lobe mass and a posterior approach.  Again the left lung mass was sampled with transbronchial needle aspiration.  This time preliminary test did show malignant cytology.  Thereafter I performed 4 more passes in the same location and collected the sample for cytology.  . Mild oozing of blood was seen which was controlled with instillation of ice cold saline.    Once adequate sample was collected, bronchoscope was used to perform a thorough pulmonary toilet.  Subsequent to this anesthesia was reversed and patient was sent to recovery.  A postprocedure chest x-ray was ordered which is currently pending.    Endobronchial findings:     Trachea: Normal mucosa   Josselin: Normal mucosa   Right main bronchus: Normal mucosa   Right upper lobe bronchus: Normal mucosa   Right middle lobe bronchus: Normal mucosa   Right lower lobe bronchus: Normal mucosa   Left main bronchus: Normal mucosa   Left upper lobe bronchus: Normal mucosa   Left lower lobe bronchus: Mucosa was swollen and an endobronchial growth     Main josselin looked sharp.  Bronchial airways were blocked in the left lower lobe airways.  There was an endobronchial growth present possibly due to mucosal swelling.  There were scant mucoid secretions present in bilateral airways.  Preliminary diagnosis on RUCHI: Non-small cell lung cancer         Left lower lobe endobronchial growth           Left uppler lobe airways narrowed      The patient was taken to the endoscopy recovery area in satisfactory condition.      Recommendation:   1. F/U on cytology results   2.  Follow-up on histopathology results      Jhonny Faria MD  Pulmonary Critical Care and Sleep Medicine  2/15/2024, 10:58 AM    This note was completed using dragon medical speech recognition software. Grammatical errors, random word insertions, pronoun errors and incomplete sentences are occasional consequences of this technology due to software limitations. If there are questions or concerns about  199.9

## 2024-02-15 NOTE — PROGRESS NOTES
Procedure complete, balloon off and intact.  Electronically signed by Melvi Pedersen RN on 2/15/2024 at 10:29 AM

## 2024-02-15 NOTE — PROGRESS NOTES
BAL  Location: left lower lobe   Input: 30 mL  Output: 10 mL  Electronically signed by Melvi Pedersen RN on 2/15/2024 at 10:20 AM

## 2024-02-15 NOTE — PROGRESS NOTES
Admit Date: 2/12/2024  Diet: ADULT DIET; Regular    CC: Dysphagia    Interval history:   Overnight, there were no acute events. Patient's vitals remained stable    Patient was not in his room this morning as he was down in the endoscopy suite for his bronch + biopsy. Will stop by to see him later in the afternoon. Spoke with patient's sister to update her on the current plan     Plan:     -Bronchoscopic biopsy of this mass today  -Will await bx results and further recommendations from oncology    Assessment:   Gary Waddell is a 78 y.o. male with PMH of tobacco abuse, CKD 3, HTN  who was admitted with dysphagia and found to have a L Lung Mass    NO pulmonary embolism   Stop Hep gtt  Negative BL LE Doppler US  Pending Echo  Vascular consult appreciated  Check viral panel given runny nose  Droplet plus  L lung mass  New diagnosis  Pulm consult appreciated  Bronch per Pulm  Onc consult appreciated  Check CT Ab/P  Dysphagia  NPO  GI consult appreciated  EGD per GI  IVF  CKD 3  Renal consult pending  Renal mass  Urology consult pending  F/U CT Ab/P  HTN  Hold BP meds  Tobacco abuse disorder  Nicotine patch  Counseled on smoking cessation for 11 minutes during this admission     Code Status: DNR-CCA   FEN: ADULT DIET; Regular   DVT PPX: [] Lovenox, [x]Heparin, [] SCDs,[] Eliquis, [] Xarelto, [] Coumadin  DISPO: [x] GMF, [] ICU, [] CVU    Adrian Castillo MD  2/15/2024,  5:44 PM    This note was likely completed using voice recognition technology and may contain unintended errors.     Objective:   Vitals:   T-max:  Patient Vitals for the past 8 hrs:   BP Temp Temp src Pulse Resp SpO2   02/15/24 1215 109/70 97.5 °F (36.4 °C) Axillary 90 18 94 %   02/15/24 1200 103/60 97.5 °F (36.4 °C) Temporal 92 19 93 %   02/15/24 1145 120/68 -- -- 92 24 94 %   02/15/24 1130 (!) 100/56 -- -- 88 19 99 %   02/15/24 1115 121/68 -- -- 89 23 99 %   02/15/24 1105 105/66 97.6 °F (36.4 °C) Temporal 86 21 96 %   02/15/24 1100 (!) 105/55 -- --

## 2024-02-15 NOTE — ANESTHESIA POSTPROCEDURE EVALUATION
Department of Anesthesiology  Postprocedure Note    Patient: Gary Waddell  MRN: [id number]  YOB: 1945  Date of evaluation: 2/15/2024    Procedure Summary       Date: 02/15/24 Room / Location: [place] [address] / [facility]    Anesthesia Start: 0859 Anesthesia Stop: 1055    Procedures:       BRONCHOSCOPY W/EBUS FNA      BRONCHOSCOPY NEEDLE BIOPSY (Abdomen)      BRONCHOSCOPY BRUSHINGS (Abdomen)      BRONCHOSCOPY BIOPSY BRONCHUS (Abdomen)      BRONCHOSCOPY ALVEOLAR LAVAGE (Abdomen) Diagnosis:       Abnormal chest CT      (Abnormal chest CT [R93.89])    Surgeons: Jhonny Faria MD Responsible Provider: Olivier Martinez MD    Anesthesia Type: general ASA Status: 4            Anesthesia Type: No value filed.    Kaykay Phase I: Kaykay Score: 5    Kaykay Phase II:      Anesthesia Post Evaluation    Patient location during evaluation: PACU  Patient participation: complete - patient participated  Level of consciousness: awake and alert  Airway patency: patent  Nausea & Vomiting: no vomiting and no nausea  Cardiovascular status: hemodynamically stable  Respiratory status: acceptable  Hydration status: stable  Pain management: adequate    No notable events documented.

## 2024-02-15 NOTE — ANESTHESIA PRE PROCEDURE
Department of Anesthesiology  Preprocedure Note       Name:  Gary Waddell   Age:  78 y.o.  :  1945                                          MRN:  [id number]         Date:  2/15/2024      Surgeon: Surgeon(s):  Jhonny Faria MD    Procedure: Procedure(s):  BRONCHOSCOPY ENDOBRONCHIAL ULTRASOUND    Medications prior to admission:   Prior to Admission medications    Medication Sig Start Date End Date Taking? Authorizing Provider   LACTOBACILLUS PO Take 1 tablet by mouth daily 24 Yes Giovanni Antony MD   amoxicillin-clavulanate (AUGMENTIN) 875-125 MG per tablet Take 1 tablet by mouth 2 times daily 24 Yes Giovanni Antony MD   umeclidinium bromide (INCRUSE ELLIPTA) 62.5 MCG/ACT inhaler Inhale 1 puff into the lungs daily   Yes Giovanni Antony MD   ferrous sulfate (IRON 325) 325 (65 Fe) MG tablet Take 1 tablet by mouth daily (with breakfast)   Yes Giovanni Antony MD   escitalopram (LEXAPRO) 10 MG tablet Take 1 tablet by mouth daily   Yes Giovanni Antony MD   polyethylene glycol (MIRALAX) 17 g PACK packet Take 17 g by mouth as needed (constipation)   Yes Giovanni Antony MD   methocarbamol (ROBAXIN) 750 MG tablet Take 1 tablet by mouth 3 times daily as needed   Yes Giovanni Antony MD   Dextromethorphan-guaiFENesin  MG/5ML SYRP Take 5 mLs by mouth every 6 hours as needed for Cough   Yes Giovanni Antony MD   vitamin D (ERGOCALCIFEROL) 1.25 MG (24752 UT) CAPS capsule Take 1 capsule by mouth once a week   Yes Giovanni Antony MD   ondansetron (ZOFRAN) 4 MG tablet Take 1 tablet by mouth every 8 hours as needed for Nausea or Vomiting   Yes Giovanni Antony MD   cetirizine (ZYRTEC) 5 MG tablet Take 5 mg by mouth daily  Patient not taking: Reported on 2024    Giovanni Antony MD   Tamsulosin HCl (FLOMAX PO) Take 0.4 mg by mouth    Giovanni Antony MD   fluticasone (FLONASE) 50 MCG/ACT nasal spray 1 spray by Each Nostril
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
## 2024-02-15 NOTE — PROGRESS NOTES
Pt arrived from ENDO to PACU bay 4. Reported received from ENDO staff. Pt unresponsive s/p anesthesia-breathing spontaneously. Pt on 6L simple mask. NSR, and VSS. Will continue to monitor.

## 2024-02-16 LAB
ANION GAP SERPL CALCULATED.3IONS-SCNC: 14 MMOL/L (ref 3–16)
BUN SERPL-MCNC: 30 MG/DL (ref 7–20)
CALCIUM SERPL-MCNC: 9.6 MG/DL (ref 8.3–10.6)
CHLORIDE SERPL-SCNC: 102 MMOL/L (ref 99–110)
CO2 SERPL-SCNC: 18 MMOL/L (ref 21–32)
CREAT SERPL-MCNC: 1.4 MG/DL (ref 0.8–1.3)
DEPRECATED RDW RBC AUTO: 14.6 % (ref 12.4–15.4)
GFR SERPLBLD CREATININE-BSD FMLA CKD-EPI: 51 ML/MIN/{1.73_M2}
GLUCOSE SERPL-MCNC: 100 MG/DL (ref 70–99)
HCT VFR BLD AUTO: 34.6 % (ref 40.5–52.5)
HGB BLD-MCNC: 11 G/DL (ref 13.5–17.5)
MCH RBC QN AUTO: 29.6 PG (ref 26–34)
MCHC RBC AUTO-ENTMCNC: 31.9 G/DL (ref 31–36)
MCV RBC AUTO: 92.9 FL (ref 80–100)
PLATELET # BLD AUTO: 381 K/UL (ref 135–450)
PMV BLD AUTO: 9 FL (ref 5–10.5)
POTASSIUM SERPL-SCNC: 5.5 MMOL/L (ref 3.5–5.1)
RBC # BLD AUTO: 3.72 M/UL (ref 4.2–5.9)
SODIUM SERPL-SCNC: 134 MMOL/L (ref 136–145)
WBC # BLD AUTO: 7.8 K/UL (ref 4–11)

## 2024-02-16 PROCEDURE — 94640 AIRWAY INHALATION TREATMENT: CPT

## 2024-02-16 PROCEDURE — 6370000000 HC RX 637 (ALT 250 FOR IP): Performed by: INTERNAL MEDICINE

## 2024-02-16 PROCEDURE — 1200000000 HC SEMI PRIVATE

## 2024-02-16 PROCEDURE — 2700000000 HC OXYGEN THERAPY PER DAY

## 2024-02-16 PROCEDURE — 2580000003 HC RX 258: Performed by: INTERNAL MEDICINE

## 2024-02-16 PROCEDURE — 94761 N-INVAS EAR/PLS OXIMETRY MLT: CPT

## 2024-02-16 PROCEDURE — 2500000003 HC RX 250 WO HCPCS: Performed by: INTERNAL MEDICINE

## 2024-02-16 PROCEDURE — 99233 SBSQ HOSP IP/OBS HIGH 50: CPT | Performed by: INTERNAL MEDICINE

## 2024-02-16 PROCEDURE — 36415 COLL VENOUS BLD VENIPUNCTURE: CPT

## 2024-02-16 PROCEDURE — 85027 COMPLETE CBC AUTOMATED: CPT

## 2024-02-16 PROCEDURE — 80048 BASIC METABOLIC PNL TOTAL CA: CPT

## 2024-02-16 PROCEDURE — 6360000002 HC RX W HCPCS: Performed by: STUDENT IN AN ORGANIZED HEALTH CARE EDUCATION/TRAINING PROGRAM

## 2024-02-16 RX ADMIN — SODIUM CHLORIDE: 9 INJECTION, SOLUTION INTRAVENOUS at 04:59

## 2024-02-16 RX ADMIN — PANTOPRAZOLE SODIUM 40 MG: 40 TABLET, DELAYED RELEASE ORAL at 05:00

## 2024-02-16 RX ADMIN — SODIUM BICARBONATE 650 MG: 650 TABLET ORAL at 09:17

## 2024-02-16 RX ADMIN — Medication 2 PUFF: at 08:20

## 2024-02-16 RX ADMIN — HEPARIN SODIUM 5000 UNITS: 5000 INJECTION INTRAVENOUS; SUBCUTANEOUS at 15:16

## 2024-02-16 RX ADMIN — ESCITALOPRAM OXALATE 10 MG: 10 TABLET ORAL at 09:18

## 2024-02-16 RX ADMIN — FLUTICASONE PROPIONATE 1 SPRAY: 50 SPRAY, METERED NASAL at 09:29

## 2024-02-16 RX ADMIN — SODIUM BICARBONATE 650 MG: 650 TABLET ORAL at 15:16

## 2024-02-16 RX ADMIN — Medication 2 PUFF: at 21:23

## 2024-02-16 RX ADMIN — HEPARIN SODIUM 5000 UNITS: 5000 INJECTION INTRAVENOUS; SUBCUTANEOUS at 23:09

## 2024-02-16 RX ADMIN — TIOTROPIUM BROMIDE INHALATION SPRAY 2 PUFF: 3.12 SPRAY, METERED RESPIRATORY (INHALATION) at 08:20

## 2024-02-16 RX ADMIN — SODIUM BICARBONATE 650 MG: 650 TABLET ORAL at 21:13

## 2024-02-16 RX ADMIN — HEPARIN SODIUM 5000 UNITS: 5000 INJECTION INTRAVENOUS; SUBCUTANEOUS at 06:24

## 2024-02-16 RX ADMIN — FERROUS SULFATE TAB 325 MG (65 MG ELEMENTAL FE) 325 MG: 325 (65 FE) TAB at 09:17

## 2024-02-16 RX ADMIN — TAMSULOSIN HYDROCHLORIDE 0.4 MG: 0.4 CAPSULE ORAL at 09:22

## 2024-02-16 RX ADMIN — SODIUM BICARBONATE: 84 INJECTION, SOLUTION INTRAVENOUS at 15:19

## 2024-02-16 RX ADMIN — ASPIRIN 81 MG: 81 TABLET, COATED ORAL at 09:18

## 2024-02-16 RX ADMIN — PANTOPRAZOLE SODIUM 40 MG: 40 TABLET, DELAYED RELEASE ORAL at 15:16

## 2024-02-16 ASSESSMENT — PAIN SCALES - WONG BAKER
WONGBAKER_NUMERICALRESPONSE: 0
WONGBAKER_NUMERICALRESPONSE: 0
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
## 2024-02-16 NOTE — PLAN OF CARE
Problem: Skin/Tissue Integrity  Goal: Absence of new skin breakdown  Description: 1.  Monitor for areas of redness and/or skin breakdown  2.  Assess vascular access sites hourly  3.  Every 4-6 hours minimum:  Change oxygen saturation probe site  4.  Every 4-6 hours:  If on nasal continuous positive airway pressure, respiratory therapy assess nares and determine need for appliance change or resting period.  Outcome: Progressing     Problem: Safety - Adult  Goal: Free from fall injury  Outcome: Progressing     Problem: Chronic Conditions and Co-morbidities  Goal: Patient's chronic conditions and co-morbidity symptoms are monitored and maintained or improved  Outcome: Progressing     Problem: ABCDS Injury Assessment  Goal: Absence of physical injury  Outcome: Progressing     Problem: Pain  Goal: Verbalizes/displays adequate comfort level or baseline comfort level  Outcome: Progressing
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
## 2024-02-16 NOTE — PROGRESS NOTES
Hospitalist Progress Note      PCP: Juan J Saravia    Date of Admission: 2/12/2024    Chief Complaint: Dysphagia    Hospital Course: 78 y.o. male with history of tobacco abuse, CKD 3, HTN came to ER from [facility] with complaints of dysphagia.  Patient states things are getting stuck in his throat.  He is coughing when eating.   Admitted as inpatient for PE, lung mass, kidney mass and dysphagia.  Seen by Vascular and Pulm.  Doubt PE.  Hep gtt stopped.      Onc requested CT Ab/P.   IMPRESSION:  1. Infiltrative mass in the left renal hilum and similar appearing mass in  the anterior interpolar left kidney measuring up to 3.1 cm is noted. Direct  involvement of the left renal vein cannot be adequately assessed on this exam  but the proximity of the infiltrative mass is concerning for direct  involvement.  These findings favor primary versus metastatic disease.  2. Bilateral adrenal masses consistent with metastatic deposits.  3. Mildly enlarged upper abdominal and retroperitoneal lymph nodes.  4. Redemonstration of left infrahilar mass and thrombus in the left main  pulmonary artery.  5. Indeterminate 0.9 cm lesion in the upper pole the right kidney.     NM Bone Scan:  IMPRESSION:  No evidence to suggest osseous metastatic disease.    Pulm performed bronch on 2/15.   Path  positive for lung adenocarcinoma    GI perfomed EGD on 2/14:  #1 normal-appearing esophagus  Esophageal dilation with 46 Benítez  #3 an extensive moderate to severe gastritis with multiple ulcerations throughout the fundus body and the antrum  #3 duodenitis     Urology consulted for kidney mass.  Renal consulted for CKD.  Viral panel negative for runny nose.    Subjective:  Patient denies CP, SOB, HA or abdominal pain.      Medications:  Reviewed    Infusion Medications    sodium bicarbonate 150 mEq in dextrose 5 % 1,000 mL infusion 50 mL/hr at 02/16/24 1519    sodium chloride       Scheduled Medications    sodium bicarbonate  650 mg Oral  unspecified pulmonary embolism type, unspecified whether acute cor pulmonale present (HCC) [I26.99]      L lung adenocarcinom  Pulm consult appreciated  Bronch per Pulm  Onc consult appreciated  S/P CT Ab/P and Bone Scan  Dysphagia  Regular diet  GI consult appreciated  EGD per GI  Protonix PO bid for gastric ulcers  CKD 3  Renal consult appreciated  IVF today for acidosis  Renal mass  Urology consult appreciated  Outpatient Urology follow up  HTN  Hold BP meds  Tobacco abuse disorder  Nicotine patch  Counseled on smoking cessation for 11 minutes during this admission    DVT Prophylaxis: SCD  Diet: ADULT DIET; Regular  Code Status: DNR-CCA  PT/OT Eval Status: Following    Dispo - [facility]    Discussed with patient, nursing and CM.    Discussed with Dr Pedraza (Renal).  IVF today.    Back to LTC tomorrow.    Jose Bates MD

## 2024-02-16 NOTE — PROGRESS NOTES
Assessment completed, see doc flowsheets. Pt is A&O X1. Lung sounds are clear. VSS. Tele on. Medication given per MAR. Patient has no needs at this time. Call light within in reach, will continue to monitor.

## 2024-02-16 NOTE — PROGRESS NOTES
Urology Progress Note  [facility]     Patient: Gary Waddell MRN: [id number]  Room/Bed: 5TN-5581/5581-01   YOB: 1945  Age/Sex: 78 y.o.male  Admission Date: 2/12/2024     Date of Service:  2/16/2024    ASSESSMENT/PLAN     1. Mass of lower lobe of left lung    2. Acute pulmonary embolism without acute cor pulmonale, unspecified pulmonary embolism type (HCC)    3. Esophageal dysphagia    4. Abnormal chest CT      79 yo male longstanding hx of smoking admitted with dysphagia  Imaging this admission shows 3.1 cm LEFT renal mass. Also noted is a left lung mass.     Cr stable, h/h stable. No leukocytosis. Afvss. No hematuria. Asymptomatic from a gu standpoint     Recommendations:  RCC with metastasis vs both lung and renal cancer. No urgent gu intervention today. Pt underwent endoscopy and bronchoscopy with biopsy of left lung mass - likely NSCLC.  Will follow for now, call urology with questions. Will defer to outpatient management regarding left renal mass.     All patient questions were answered. He understands the plan as listed above.    SUBJECTIVE     Chief Complaint:   Chief Complaint   Patient presents with    Dysphagia     Pt in via [email or web address] from [facility]. Per facility, pt has been complaining of a choking sensation and that he feels like \"his pills are getting stuck in his throat.\" Pt A/Ox4 in triage.        24 Hour Events: Today patient reports no gu sx.  Otherwise symptoms are overall improved and pain is adequately controlled.  Denies nausea/vomiting.  No other genitourinary symptoms.    OBJECTIVE     Hospital Problem List:  Principal Problem:    Acute pulmonary embolism without acute cor pulmonale (HCC)  Active Problems:    Mass of left lung    Dysphagia    Left kidney mass    HTN (hypertension)    CKD (chronic kidney disease) stage 3, GFR 30-59 ml/min (HCC)    Acute pulmonary embolism, unspecified pulmonary embolism type, unspecified whether acute cor pulmonale  Contrast?->None Reason for Exam: Severe dysphagia, chest discomfort with swallowing FINDINGS: Lower neck: No lymphadenopathy. Medical devices: None. Heart: No cardiomegaly. Moderate burden of coronary artery calcifications are present. No pericardial fluid is present. Vascular Structures: A 3 vessel aortic arch is present.  Redemonstration ulcerative plaque along the lateral margin of the aortic arch measuring approximately 11 mm in depth.  Additional ulcerative plaque along the left lateral margin of the proximal descending thoracic aorta measures 8 mm in depth.  No evidence of aortic dissection.  The thoracic aortic caliber is within normal limits. The main pulmonary artery is enlarged in caliber measuring 3.3 cm.  Tumor associated thrombus is noted in the left main pulmonary artery (series 2, image 51).  Tumor thrombus extending into the pulmonary arteries supplying the left lower lobe are almost occlusive in nature. Mediastinum: Paratracheal lymph nodes are within normal limits by size criteria.  No right hilar lymphadenopathy.  Evaluation the left hilum is limited secondary to the presence of a infiltrative mass. Central airways: Central airways of the apical segment of the left lower lobe are occluded by the presence of the infiltrative mass.  Bronchiectasis noted along the central airways is of the remainder of the left lower lobe. Lungs: Infiltrative spiculated mass arising from the apical segment of the left lower lobe, invading the left hilum, including the left pulmonary artery with subsequent tumor thrombus.  This mass measures approximately 3.5 x 4.6 x 3.4 cm.  Scattered nodularity along the remainder of the left lower lobe suggests left lower lobe pulmonary metastases.  Apical predominant emphysema is noted.  The right lung is clear of consolidation or suspicious nodularity. Pleura: No pleural effusion or pneumothorax. Upper Abdomen: Bilateral adrenal nodularity, left greater than right is present with

## 2024-02-16 NOTE — PROGRESS NOTES
Speech Language Pathology  Attempt Note     Name: Gary Waddell  : 1945  Medical Diagnosis: Esophageal dysphagia [R13.19]  Mass of lower lobe of left lung [R91.8]  Acute pulmonary embolism without acute cor pulmonale, unspecified pulmonary embolism type (HCC) [I26.99]  Acute pulmonary embolism, unspecified pulmonary embolism type, unspecified whether acute cor pulmonale present (HCC) [I26.99]      SLP attempted to see pt for dysphagia tx. Treatment unable to be completed due to pt politely declining trials at this time. SLP to re-attempt as pt's condition and schedule allows. No charges.    Thank you,  Brooke De Leon MA CCC-SLP #[id number]  Speech Language Pathologist

## 2024-02-16 NOTE — PROGRESS NOTES
Gastroenterology Progress Note      Gary Waddell is a 78 y.o. male patient.  1. Mass of lower lobe of left lung    2. Acute pulmonary embolism without acute cor pulmonale, unspecified pulmonary embolism type (HCC)    3. Esophageal dysphagia    4. Abnormal chest CT        SUBJECTIVE:          Physical    VITALS:  /62   Pulse 81   Temp 97.4 °F (36.3 °C) (Oral)   Resp 16   Ht 1.778 m (5' 10\")   Wt 98.6 kg (217 lb 6 oz)   SpO2 97%   BMI 31.19 kg/m²   TEMPERATURE:  Current - Temp: 97.4 °F (36.3 °C); Max - Temp  Av.6 °F (36.4 °C)  Min: 97.4 °F (36.3 °C)  Max: 97.9 °F (36.6 °C)    NAD  RRR, Nl s1s2  Lungs CTA Bilaterally, normal effort  Abdomen soft, ND, NT, no HSM, Bowel sounds normal  AAOx3, No asterixis     Data    Data Review:    Recent Labs     24  0552 02/15/24  0534 24  0547   WBC 7.8 7.3 7.8   HGB 13.0* 11.7* 11.0*   HCT 39.6* 35.2* 34.6*   MCV 91.6 91.4 92.9    384 381     Recent Labs     24  0552 02/15/24  0534 24  0547    138 134*   K 4.9 4.9 5.5*   CL 98* 102 102   CO2 18* 19* 18*   BUN 32* 28* 30*   CREATININE 1.2 1.2 1.4*     No results for input(s): \"AST\", \"ALT\", \"ALB\", \"BILIDIR\", \"BILITOT\", \"ALKPHOS\" in the last 72 hours.  No results for input(s): \"LIPASE\", \"AMYLASE\" in the last 72 hours.  No results for input(s): \"PROTIME\", \"INR\" in the last 72 hours.  No results for input(s): \"PTT\" in the last 72 hours.           ASSESSMENT / PLAN      Dysphagia-occurred with large pills.  No dysphagia to liquids or solid food at home.  EGD  with normal-appearing esophagus empirically dilated with 46 Benítez.  Multiple gastric ulcers and duodenitis.  Dysphagia resolved.  -PPI    Gastric ulcers -per EGD .  -PPI  - f/u path     Lung mass -s/p bronchoscopy per pulm.     Will sign off. Please call if questions.    Discussed with Dr. Didier Duran, PA-C  [facility]    This a very pleasant 78-year-old male seen at the bedside this  morning with our certified physicians assistant    This patient we did an EGD on 2 days ago his biopsies did not show any other overt abnormalities there is no H. Pylori    He does have multiple gastric ulcers and duodenitis    He should be on a continual proton pump inhibitor  He will need a follow-up EGD in 3 to 4 months    At this time the GI team will be taking a step back from his care thank you    Marcello Velásquez MD

## 2024-02-17 VITALS
RESPIRATION RATE: 16 BRPM | HEART RATE: 88 BPM | OXYGEN SATURATION: 93 % | WEIGHT: 217.37 LBS | BODY MASS INDEX: 31.12 KG/M2 | HEIGHT: 70 IN | TEMPERATURE: 98 F | DIASTOLIC BLOOD PRESSURE: 65 MMHG | SYSTOLIC BLOOD PRESSURE: 111 MMHG

## 2024-02-17 LAB
ANION GAP SERPL CALCULATED.3IONS-SCNC: 12 MMOL/L (ref 3–16)
BASOPHILS # BLD: 0.1 K/UL (ref 0–0.2)
BASOPHILS NFR BLD: 1 %
BUN SERPL-MCNC: 25 MG/DL (ref 7–20)
CALCIUM SERPL-MCNC: 9.6 MG/DL (ref 8.3–10.6)
CHLORIDE SERPL-SCNC: 101 MMOL/L (ref 99–110)
CO2 SERPL-SCNC: 23 MMOL/L (ref 21–32)
CREAT SERPL-MCNC: 1.1 MG/DL (ref 0.8–1.3)
DEPRECATED RDW RBC AUTO: 14.2 % (ref 12.4–15.4)
EOSINOPHIL # BLD: 0.1 K/UL (ref 0–0.6)
EOSINOPHIL NFR BLD: 1 %
GFR SERPLBLD CREATININE-BSD FMLA CKD-EPI: >60 ML/MIN/{1.73_M2}
GLUCOSE SERPL-MCNC: 90 MG/DL (ref 70–99)
HCT VFR BLD AUTO: 32 % (ref 40.5–52.5)
HGB BLD-MCNC: 10.6 G/DL (ref 13.5–17.5)
LYMPHOCYTES # BLD: 2.2 K/UL (ref 1–5.1)
LYMPHOCYTES NFR BLD: 30.2 %
MCH RBC QN AUTO: 29.9 PG (ref 26–34)
MCHC RBC AUTO-ENTMCNC: 33 G/DL (ref 31–36)
MCV RBC AUTO: 90.5 FL (ref 80–100)
MONOCYTES # BLD: 0.7 K/UL (ref 0–1.3)
MONOCYTES NFR BLD: 9.2 %
NEUTROPHILS # BLD: 4.2 K/UL (ref 1.7–7.7)
NEUTROPHILS NFR BLD: 58.6 %
PLATELET # BLD AUTO: 360 K/UL (ref 135–450)
PMV BLD AUTO: 9.1 FL (ref 5–10.5)
POTASSIUM SERPL-SCNC: 4.3 MMOL/L (ref 3.5–5.1)
RBC # BLD AUTO: 3.53 M/UL (ref 4.2–5.9)
SODIUM SERPL-SCNC: 136 MMOL/L (ref 136–145)
WBC # BLD AUTO: 7.2 K/UL (ref 4–11)

## 2024-02-17 PROCEDURE — 85025 COMPLETE CBC W/AUTO DIFF WBC: CPT

## 2024-02-17 PROCEDURE — 36415 COLL VENOUS BLD VENIPUNCTURE: CPT

## 2024-02-17 PROCEDURE — 6370000000 HC RX 637 (ALT 250 FOR IP): Performed by: INTERNAL MEDICINE

## 2024-02-17 PROCEDURE — 94761 N-INVAS EAR/PLS OXIMETRY MLT: CPT

## 2024-02-17 PROCEDURE — 94640 AIRWAY INHALATION TREATMENT: CPT

## 2024-02-17 PROCEDURE — 6360000002 HC RX W HCPCS: Performed by: STUDENT IN AN ORGANIZED HEALTH CARE EDUCATION/TRAINING PROGRAM

## 2024-02-17 PROCEDURE — 80048 BASIC METABOLIC PNL TOTAL CA: CPT

## 2024-02-17 RX ORDER — PANTOPRAZOLE SODIUM 40 MG/1
40 TABLET, DELAYED RELEASE ORAL
Qty: 60 TABLET | Refills: 0 | Status: SHIPPED | OUTPATIENT
Start: 2024-02-17

## 2024-02-17 RX ADMIN — TIOTROPIUM BROMIDE INHALATION SPRAY 2 PUFF: 3.12 SPRAY, METERED RESPIRATORY (INHALATION) at 09:23

## 2024-02-17 RX ADMIN — TAMSULOSIN HYDROCHLORIDE 0.4 MG: 0.4 CAPSULE ORAL at 08:56

## 2024-02-17 RX ADMIN — SODIUM BICARBONATE 650 MG: 650 TABLET ORAL at 08:56

## 2024-02-17 RX ADMIN — HEPARIN SODIUM 5000 UNITS: 5000 INJECTION INTRAVENOUS; SUBCUTANEOUS at 06:33

## 2024-02-17 RX ADMIN — FLUTICASONE PROPIONATE 1 SPRAY: 50 SPRAY, METERED NASAL at 08:54

## 2024-02-17 RX ADMIN — ASPIRIN 81 MG: 81 TABLET, COATED ORAL at 08:56

## 2024-02-17 RX ADMIN — ESCITALOPRAM OXALATE 10 MG: 10 TABLET ORAL at 08:56

## 2024-02-17 RX ADMIN — Medication 2 PUFF: at 09:23

## 2024-02-17 RX ADMIN — FERROUS SULFATE TAB 325 MG (65 MG ELEMENTAL FE) 325 MG: 325 (65 FE) TAB at 08:56

## 2024-02-17 RX ADMIN — PANTOPRAZOLE SODIUM 40 MG: 40 TABLET, DELAYED RELEASE ORAL at 06:33

## 2024-02-17 NOTE — PROGRESS NOTES
ONCOLOGY HEMATOLOGY CARE PROGRESS NOTE      SUBJECTIVE:    Pathology discussed with patient informed him of lung cancer diagnosis I also spoke to the patient's sister Bettye Gay on [phone] And informed them about the results      ROS:     14 point review of systems performed, negative except for as documented in HPI/Interval history      OBJECTIVE        Physical    VITALS:  Patient Vitals for the past 24 hrs:   BP Temp Temp src Pulse Resp SpO2 Weight   02/16/24 0822 -- -- -- 81 16 97 % --   02/16/24 0738 104/62 97.4 °F (36.3 °C) Oral 83 18 99 % --   02/16/24 0446 108/69 97.9 °F (36.6 °C) Axillary 85 18 95 % 98.6 kg (217 lb 6 oz)   02/16/24 0007 99/61 97.4 °F (36.3 °C) Axillary 62 18 97 % --   02/15/24 2011 -- -- -- -- -- 93 % --   02/15/24 2003 117/77 97.5 °F (36.4 °C) Axillary 88 16 95 % --       24HR INTAKE/OUTPUT:    Intake/Output Summary (Last 24 hours) at 2/16/2024 1935  Last data filed at 2/16/2024 1330  Gross per 24 hour   Intake 1300 ml   Output 1250 ml   Net 50 ml       CONSTITUTIONAL: awake, alert, cooperative, no apparent distress   LUNGS: no increased work of breathing and clear to auscultation   CARDIOVASCULAR: regular rate and rhythm, normal S1 and S2, no murmur noted  ABDOMEN: normal bowel sounds x 4, soft, non-distended, non-tender, no masses palpated, no hepatosplenomgaly   EXTREMITIES: no LE edema     DATA:  CBC:    Recent Labs     02/16/24  0547 02/15/24  0534 02/14/24  0552 02/13/24  0527 02/12/24  1900 02/12/24  1401   WBC 7.8 7.3 7.8 7.9   < > 8.2   NEUTROABS  --  4.0 4.5 4.1  --  4.4   LYMPHOPCT  --  30.6 27.2 33.1  --  31.3   RBC 3.72* 3.85* 4.33 4.36   < > 4.63   HGB 11.0* 11.7* 13.0* 13.2*   < > 14.0   HCT 34.6* 35.2* 39.6* 40.2*   < > 42.7   MCV 92.9 91.4 91.6 92.1   < > 92.1   MCH 29.6 30.3 30.1 30.3   < > 30.1   MCHC 31.9 33.2 32.9 32.9   < > 32.7   RDW 14.6 14.3 14.2 14.7   < > 14.0    384 380 382   < > 342    < > = values in this

## 2024-02-17 NOTE — DISCHARGE SUMMARY
Hospital Medicine Discharge Summary    Patient: Gary Waddell     Gender: male  : 1945   Age: 78 y.o.  MRN: [id number]    Admitting Physician: Jose Bates MD  Discharge Physician: Jose Bates MD     Code Status: DNR-CCA     Admit Date: 2024   Discharge Date:   24    Disposition:  [facility]    Discharge Diagnoses:    Active Hospital Problems    Diagnosis Date Noted    Cigarette nicotine dependence without complication [F17.210] 2024    Centrilobular emphysema (HCC) [J43.2] 2024    Mass of lower lobe of left lung [R91.8] 2024    Mass of left lung [R91.8] 2024    Dysphagia [R13.10] 2024    Acute pulmonary embolism without acute cor pulmonale (HCC) [I26.99] 2024    Left kidney mass [N28.89] 2024    HTN (hypertension) [I10] 2024    CKD (chronic kidney disease) stage 3, GFR 30-59 ml/min (HCC) [N18.30] 2024    Acute pulmonary embolism, unspecified pulmonary embolism type, unspecified whether acute cor pulmonale present (HCC) [I26.99] 2024       Follow-up appointments:  one week    Outpatient to do list: F/U with PCP, Onc, Pulm, GI, Renal     Condition at Discharge:  Stable    Hospital Course:   78 y.o. male with history of tobacco abuse, CKD 3, HTN came [facility] ER from [facility] with complaints of dysphagia.  Patient states things are getting stuck in his throat.  He is coughing when eating.   Admitted as inpatient for PE, lung mass, kidney mass and dysphagia.  Seen by Vascular and Pulm.  Doubt PE.  Hep gtt stopped.       Onc requested CT Ab/P.   IMPRESSION:  1. Infiltrative mass in the left renal hilum and similar appearing mass in  the anterior interpolar left kidney measuring up to 3.1 cm is noted. Direct  involvement of the left renal vein cannot be adequately assessed on this exam  but the proximity of the infiltrative mass is concerning for direct  involvement.  These findings favor primary versus metastatic  exposure control, iterative reconstruction, and/or weight based adjustment of the mA/kV was utilized to reduce the radiation dose to as low as reasonably achievable. COMPARISON: None. HISTORY: ORDERING SYSTEM PROVIDED HISTORY: mental status change TECHNOLOGIST PROVIDED HISTORY: Reason for exam:->mental status change Has a \"code stroke\" or \"stroke alert\" been called?->No Reason for Exam: mental status change Relevant Medical/Surgical History: mental status change FINDINGS: BRAIN/VENTRICLES: There is no acute intracranial hemorrhage, mass effect or midline shift.  No abnormal extra-axial fluid collection.  The gray-white differentiation is maintained without evidence of an acute infarct.  There is no evidence of hydrocephalus. Mild chronic white matter microvascular ischemic changes are present. ORBITS: The visualized portion of the orbits demonstrate no acute abnormality. SINUSES: Paranasal sinuses are well aerated.  Trace right mastoid effusion is present.  No left mastoid effusion is evident. SOFT TISSUES/SKULL:  No acute abnormality of the visualized skull or soft tissues.     1.  No acute intracranial abnormality. 2. Trace right mastoid effusion.     VL Extremity Venous Bilateral    Result Date: 2/13/2024  Lower Extremities DVT Study  Demographics   Patient Name       CHRISTIAN STEINER   Date of Study      02/13/2024        Gender              Male   Patient Number     [id number]        Date of Birth       1945   Visit Number       [id number]         Age                 78 year(s)   Accession Number   [id number]        Room Number         5581   Corporate ID       [id number]           Sonographer         Brigitte Jose,                                                           RVT, RT   Ordering Physician Jose Bates MD  Interpreting        Juanpablo Regan MD                                       Physician  Procedure Type of Study:   Veins:Lower Extremities DVT Study, VASC EXTREMITY VENOUS DUPLEX BILATERAL.

## 2024-02-17 NOTE — CARE COORDINATION
Case Management -  Discharge Note      Patient Name: Gary Waddell                   YOB: 1945            Readmission Risk (Low < 19, Mod (19-27), High > 27): Readmission Risk Score: 13.5    Current PCP: Juan J Saravia    ([facility]) Important Message from Medicare:    Date: 2/17/2024    PT AM-PAC: 11 /24  OT AM-PAC: 16 /24    Patient noted to have a discharge order.  Pt has been medically cleared to return to LTC (Long Term Care)    Patient discharged to   Home at [facility]  [address] [place] [address]  Report: [phone]  Fax:  [phone]         Pre-cert Required/obtained: N/A  Transportation scheduled for 1600  Transportation provided by [facility]  AVS to be faxed to [phone] and [phone] and agency was notified  Family Notified: Yes, CM spoke with the pt's sister, Bettye, over the phone to inform her of pt's d/c.  Nurse to call report to facility [phone]        Financial    [facility]or: [facility] / Plan: [facility] DUAL / Product Type: *No Product type* /     Pharmacy:  Potential assistance Purchasing Medications: No  Meds-to-Beds request: No      [facility] [place], [place] - [address] [facility] - P [phone] - F [phone]  [address] [facility] [address]  Phone: [phone] Fax: [phone]      Notes:    Additional Case Management Notes:     All CM needs have been met.

## 2024-02-17 NOTE — DISCHARGE INSTR - COC
Continuity of Care Form    Patient Name: Gary Waddell   :  1945  MRN:  [id number]    Admit date:  2024  Discharge date:  2024    Code Status Order: DNR-CCA   Advance Directives:   Advance Care Flowsheet Documentation       Date/Time Healthcare Directive Type of Healthcare Directive Copy in Chart Healthcare Agent Appointed Healthcare Agent's Name Healthcare Agent's Phone Number    02/15/24 0828 Yes, patient has an advance directive for healthcare treatment Living will;Other (Comment)  DNR-CCA No, copy requested from family -- -- --            Admitting Physician:  Jose Bates MD  PCP: Juan J Saravia    Discharging Nurse: Melissa  Discharging Hospital Unit/Room#: 5TN-5581/5581-01  Discharging Unit Phone Number: [phone]    Emergency Contact:   Extended Emergency Contact Information  Primary Emergency Contact: Bettye Gay  Address: SISTER IN LAW  Home Phone: [phone]  Work Phone: [phone]  Relation: Brother/Sister  Secondary Emergency Contact: AmishIrma stephens  Home Phone: [phone]  Relation: Friend  Preferred language: English   needed? No    Past Surgical History:  Past Surgical History:   Procedure Laterality Date    BRONCHOSCOPY  2/15/2024    BRONCHOSCOPY N/A 2/15/2024    BRONCHOSCOPY W/EBUS FNA performed by Jhonny Faria MD at [facility] ENDOSCOPY    BRONCHOSCOPY N/A 2/15/2024    BRONCHOSCOPY NEEDLE BIOPSY performed by Jhonny Faria MD at [facility] ENDOSCOPY    BRONCHOSCOPY N/A 2/15/2024    BRONCHOSCOPY BRUSHINGS performed by Jhonny Faria MD at [facility] ENDOSCOPY    BRONCHOSCOPY N/A 2/15/2024    BRONCHOSCOPY BIOPSY BRONCHUS performed by Jhonny Faria MD at [facility] ENDOSCOPY    BRONCHOSCOPY N/A 2/15/2024    BRONCHOSCOPY ALVEOLAR LAVAGE performed by Jhonny Faria MD at [facility] ENDOSCOPY    ESOPHAGEAL DILATATION N/A 2024    ESOPHAGEAL DILATION NUNEZ performed by Marcello Velásquez MD at [facility] ENDOSCOPY    FRACTURE SURGERY      ribs    TONSILLECTOMY       days: 2587        Elimination:  Continence:   Bowel: No  Bladder: No  Urinary Catheter: None   Colostomy/Ileostomy/Ileal Conduit: No       Date of Last BM: 2/16/2024    Intake/Output Summary (Last 24 hours) at 2/17/2024 1126  Last data filed at 2/16/2024 2345  Gross per 24 hour   Intake 240 ml   Output 350 ml   Net -110 ml     I/O last 3 completed shifts:  In: 1300 [P.O.:300; I.V.:1000]  Out: 1600 [Urine:1600]    Safety Concerns:     At Risk for Falls    Impairments/Disabilities:      None    Nutrition Therapy:  Current Nutrition Therapy:   - Oral Diet:  General    Routes of Feeding: Oral  Liquids: Thin Liquids  Daily Fluid Restriction: no  Last Modified Barium Swallow with Video (Video Swallowing Test): not done    Treatments at the Time of Hospital Discharge:   Respiratory Treatments: yes  Oxygen Therapy:  is on oxygen at 2 L/min per nasal cannula.  Ventilator:    - No ventilator support    Rehab Therapies: n/a  Weight Bearing Status/Restrictions: No weight bearing restrictions  Other Medical Equipment (for information only, NOT a DME order):  wheelchair and walker  Other Treatments: n/a    Patient's personal belongings (please select all that are sent with patient):  Pant and cigarettes.    RN SIGNATURE:  {Esignature:[id number]}    CASE MANAGEMENT/SOCIAL WORK SECTION    Inpatient Status Date: 2/12/2024    Readmission Risk Assessment Score: 14%  Readmission Risk              Risk of Unplanned Readmission:  12           Discharging to Facility/ Agency   Home at [address] [address]  Report: [phone]  Fax:  [phone]    / signature: Electronically signed by LELA Fitzgerald on 2/17/24 at 12:27 PM EST    PHYSICIAN SECTION    Prognosis: Guarded    Condition at Discharge: Stable    Rehab Potential (if transferring to Rehab): Guarded    Recommended Labs or Other Treatments After Discharge:     Physician Certification: I certify the above information

## 2024-02-17 NOTE — PROGRESS NOTES
Assessment completed, see doc flowsheets. Pt is A&O  X3. Lung sounds are clear. VSS. Medication given per MAR. Patient has no needs at this time. Call light within in reach, will continue to monitor.

## 2024-02-17 NOTE — CARE COORDINATION
02/17/24 1223   IMM Letter   IMM Letter given to Patient/Family/Significant other/Guardian/POA/by:  Susana gave the patient the IMM letter and explained the letter to them. The patient was in agreement with discharge and signed the IMM letter.   IMM Letter date given: 02/17/24   IMM Letter time given: 1220         Electronically signed by LELA Fitzgerald on 2/17/2024 at 12:24 PM

## 2024-02-21 ENCOUNTER — TELEPHONE (OUTPATIENT)
Dept: PULMONOLOGY | Age: 79
End: 2024-02-21

## 2024-02-21 DIAGNOSIS — C34.32 MALIGNANT NEOPLASM OF LOWER LOBE OF LEFT LUNG (HCC): Primary | ICD-10-CM

## 2024-02-21 NOTE — TELEPHONE ENCOUNTER
Spoke with patient's sister.  She is aware of the results.  Dr. Garcia has already called her.  Appt scheduled 02/27/24 with Dr. Garcia.

## 2024-03-01 ENCOUNTER — OFFICE VISIT (OUTPATIENT)
Dept: PULMONOLOGY | Age: 79
End: 2024-03-01

## 2024-03-01 VITALS
HEIGHT: 70 IN | OXYGEN SATURATION: 100 % | WEIGHT: 202 LBS | SYSTOLIC BLOOD PRESSURE: 98 MMHG | BODY MASS INDEX: 28.92 KG/M2 | DIASTOLIC BLOOD PRESSURE: 50 MMHG | HEART RATE: 97 BPM

## 2024-03-01 DIAGNOSIS — F17.210 CIGARETTE NICOTINE DEPENDENCE WITHOUT COMPLICATION: ICD-10-CM

## 2024-03-01 DIAGNOSIS — C34.92 ADENOCARCINOMA OF LEFT LUNG (HCC): ICD-10-CM

## 2024-03-01 DIAGNOSIS — Z09 HOSPITAL DISCHARGE FOLLOW-UP: ICD-10-CM

## 2024-03-01 DIAGNOSIS — J43.2 CENTRILOBULAR EMPHYSEMA (HCC): Primary | ICD-10-CM

## 2024-03-01 RX ORDER — IPRATROPIUM BROMIDE AND ALBUTEROL SULFATE 2.5; .5 MG/3ML; MG/3ML
1 SOLUTION RESPIRATORY (INHALATION) EVERY 4 HOURS
Qty: 360 ML | Refills: 4 | Status: SHIPPED | OUTPATIENT
Start: 2024-03-01

## 2024-03-01 NOTE — PROGRESS NOTES
volumes were tested by plethysmography. The total lung capacity was 5.17 liters (78% of predicted), which was decreased. The residual volume was 2.49 liters (92% of predicted), which was normal. The ratio of residual volume to total lung capacity (RV/TLC) was 113, which was normal. Specific airway resistance was normal. Diffusion capacity was found to be decreased.      Interpretation:  Obstructive airway disease with reduced diffusion capacity.    IMPRESSION AND RECOMMENDATIONS:     1. Centrilobular emphysema (HCC)  -Patient has severe obstructive airway disease.  -He will continue with Symbicort and Incruse Ellipta as before.  -Albuterol as needed.  -I will start him on DuoNeb nebulization to be taken as needed.  -Instructed patient to let me know if he is having any increased shortness of breath, cough or discolored expectoration.    2. Adenocarcinoma of left lung (HCC)  -Management as per medical oncology.  -As of now patient has decided not to pursue any aggressive therapy.    3. Cigarette nicotine dependence without complication  -Patient counselled on the dangers of tobacco use and urged to quit. Also discussed the importance of a supportive environment and helped identify them. Discussed possibility of various Nicotine replacement therapies if experiencing prolonged craving or withdrawal symptoms. Discussed the possibility of negative mood or depression after quitting. Reassured that some weight gain after smoking is common and dietary, exercise, or lifestyle changes will be able to control it. Time spent counseling was 5 minutes      Return in about 6 months (around 9/1/2024) for COPD.         Jhonny Faria MD, [facility] Pulmonary, Critical Care and Sleep Medicine  [address], [address], [place] [address]  3/1/2024, 9:59 AM    This note was completed using dragon medical speech recognition software. Grammatical errors, random word insertions, pronoun errors and incomplete sentences are

## 2024-03-07 ENCOUNTER — HOSPITAL ENCOUNTER (OUTPATIENT)
Dept: MRI IMAGING | Age: 79
Discharge: HOME | End: 2024-03-07
Attending: STUDENT IN AN ORGANIZED HEALTH CARE EDUCATION/TRAINING PROGRAM
Payer: COMMERCIAL

## 2024-03-07 DIAGNOSIS — C34.32 PRIMARY MALIGNANT NEOPLASM OF BRONCHUS OF LEFT LOWER LOBE (HCC): ICD-10-CM

## 2024-03-07 DIAGNOSIS — I26.99 ACUTE PULMONARY EMBOLISM WITHOUT ACUTE COR PULMONALE, UNSPECIFIED PULMONARY EMBOLISM TYPE (HCC): ICD-10-CM

## 2024-03-07 PROCEDURE — 6360000004 HC RX CONTRAST MEDICATION: Performed by: STUDENT IN AN ORGANIZED HEALTH CARE EDUCATION/TRAINING PROGRAM

## 2024-03-07 PROCEDURE — A9577 INJ MULTIHANCE: HCPCS | Performed by: STUDENT IN AN ORGANIZED HEALTH CARE EDUCATION/TRAINING PROGRAM

## 2024-03-07 PROCEDURE — 70553 MRI BRAIN STEM W/O & W/DYE: CPT

## 2024-03-07 RX ORDER — SODIUM CHLORIDE 0.9 % (FLUSH) 0.9 %
5-40 SYRINGE (ML) INJECTION 2 TIMES DAILY
Status: DISCONTINUED | OUTPATIENT
Start: 2024-03-07 | End: 2024-03-08 | Stop reason: HOSPADM

## 2024-03-07 RX ADMIN — GADOBENATE DIMEGLUMINE 18 ML: 529 INJECTION, SOLUTION INTRAVENOUS at 14:05

## (undated) DEVICE — FORCEPS BX L240CM WRK CHN 2.8MM STD CAP W/ NDL MIC MESH

## (undated) DEVICE — AIR/WATER CLEANING ADAPTER FOR OLYMPUS® GI ENDOSCOPE: Brand: BULLDOG®

## (undated) DEVICE — SYRINGE MED 50ML LUERLOCK TIP

## (undated) DEVICE — GOWN AURORA NONREINF LG: Brand: MEDLINE INDUSTRIES, INC.

## (undated) DEVICE — SOLUTION IV IRRIG WATER 500ML POUR BRL ST 2F7113

## (undated) DEVICE — ENDOSCOPIC KIT 6X3/16 FT COLON W/ 1.1 OZ 2 GWN W/O BRSH

## (undated) DEVICE — SINGLE USE ASPIRATION NEEDLE: Brand: SINGLE USE ASPIRATION NEEDLE

## (undated) DEVICE — Device: Brand: BALLOON

## (undated) DEVICE — MOUTHPIECE ENDOSCP L CTRL OPN AND SIDE PORTS DISP

## (undated) DEVICE — SINGLE USE SUCTION VALVE MAJ-209: Brand: SINGLE USE SUCTION VALVE (STERILE)

## (undated) DEVICE — SYRINGE MED 10ML POLYPR LUERSLIP TIP FLAT TOP W/O SFTY DISP

## (undated) DEVICE — SINGLE USE BIOPSY VALVE MAJ-210: Brand: SINGLE USE BIOPSY VALVE (STERILE)

## (undated) DEVICE — MACROBORE EXTN SET W/ 1 SMRTSITE NEEDLE-FREE VLV PRT

## (undated) DEVICE — CONMED CHANNEL MASTER PULMONARY AND PEDIATRIC CLEANING BRUSH, 160 CM X 2.0 MM: Brand: CONMED

## (undated) DEVICE — BRUSH CYTO L115CM DIA1.9MM 3 NDL PULM USE SUPERDIMENSION

## (undated) DEVICE — VALVE SUCTION AIR H2O SET ORCA POD + DISP

## (undated) DEVICE — NEEDLE CYTO 21GA L137MM DIA1.9MM PULM BRAID TAPR SHTH OPT 5PK

## (undated) DEVICE — BW-412T DISP COMBO CLEANING BRUSH: Brand: SINGLE USE COMBINATION CLEANING BRUSH

## (undated) DEVICE — Device: Brand: MEDEX